# Patient Record
Sex: FEMALE | Race: WHITE | NOT HISPANIC OR LATINO | Employment: PART TIME | URBAN - METROPOLITAN AREA
[De-identification: names, ages, dates, MRNs, and addresses within clinical notes are randomized per-mention and may not be internally consistent; named-entity substitution may affect disease eponyms.]

---

## 2018-05-08 ENCOUNTER — TRANSFERRED RECORDS (OUTPATIENT)
Dept: HEALTH INFORMATION MANAGEMENT | Facility: CLINIC | Age: 18
End: 2018-05-08

## 2018-07-11 ENCOUNTER — TRANSFERRED RECORDS (OUTPATIENT)
Dept: HEALTH INFORMATION MANAGEMENT | Facility: CLINIC | Age: 18
End: 2018-07-11

## 2018-09-11 ENCOUNTER — TRANSFERRED RECORDS (OUTPATIENT)
Dept: HEALTH INFORMATION MANAGEMENT | Facility: CLINIC | Age: 18
End: 2018-09-11

## 2018-09-12 ENCOUNTER — TRANSFERRED RECORDS (OUTPATIENT)
Dept: HEALTH INFORMATION MANAGEMENT | Facility: CLINIC | Age: 18
End: 2018-09-12

## 2018-10-10 ENCOUNTER — TRANSFERRED RECORDS (OUTPATIENT)
Dept: HEALTH INFORMATION MANAGEMENT | Facility: CLINIC | Age: 18
End: 2018-10-10

## 2019-12-09 ENCOUNTER — OFFICE VISIT (OUTPATIENT)
Dept: FAMILY MEDICINE | Facility: CLINIC | Age: 19
End: 2019-12-09
Payer: COMMERCIAL

## 2019-12-09 VITALS
RESPIRATION RATE: 16 BRPM | HEIGHT: 64 IN | TEMPERATURE: 98.3 F | BODY MASS INDEX: 19.29 KG/M2 | OXYGEN SATURATION: 98 % | SYSTOLIC BLOOD PRESSURE: 111 MMHG | DIASTOLIC BLOOD PRESSURE: 68 MMHG | HEART RATE: 78 BPM | WEIGHT: 113 LBS

## 2019-12-09 DIAGNOSIS — S61.412A LACERATION OF LEFT HAND WITHOUT FOREIGN BODY, INITIAL ENCOUNTER: Primary | ICD-10-CM

## 2019-12-09 PROCEDURE — 99203 OFFICE O/P NEW LOW 30 MIN: CPT | Performed by: FAMILY MEDICINE

## 2019-12-09 ASSESSMENT — PAIN SCALES - GENERAL: PAINLEVEL: SEVERE PAIN (7)

## 2019-12-09 ASSESSMENT — MIFFLIN-ST. JEOR: SCORE: 1262.56

## 2019-12-09 NOTE — PATIENT INSTRUCTIONS
Maximum dosages or over the counter pain medicines:    - ibuprofen (advil) 2400 mg daily - or 4 tabs three times daily  Or  - Aleve 2 tabs twice daily  AND can add  - acetamenophen (Tylenol) 4000 mg daily - or 2 tabs 4 times daily

## 2019-12-09 NOTE — PROGRESS NOTES
"Subjective     Wally Varma is a 19 year old female who presents to clinic today for the following health issues:    HPI   ED/UC Followup:    Facility:  [facility]  Date of visit: 12/5/19  Reason for visit: Laceration  Current Status: still hurting but improving     SUBJECTIVE:  Here today in follow-up of left hand laceration.  Records reviewed through care everywhere and with the patient.  About 5 days ago she cut her hand on broken glass.  This required 5 interrupted sutures.  The ER doc noted that the laceration was fairly deep almost down toward the left second MCP joint.  But it did not seem to involve joint or tendons at all.  Sutures are in place and there is a bit of swelling and patient said his finger feels stiff.  Was more sore at first but seems to be getting a little bit better.  Up-to-date on tetanus.    Review of systems otherwise negative.  Past medical, family, and social history reviewed and updated in chart.    OBJECTIVE:  /68 (BP Location: Right arm, Patient Position: Chair, Cuff Size: Adult Regular)   Pulse 78   Temp 98.3  F (36.8  C) (Oral)   Resp 16   Ht 1.626 m (5' 4\")   Wt 51.3 kg (113 lb)   LMP 11/27/2019 (Approximate)   SpO2 98%   Breastfeeding No   BMI 19.40 kg/m    Alert, pleasant, upbeat, and in no apparent discomfort.  S1 and S2 normal, no murmurs, clicks, gallops or rubs. Regular rate and rhythm. Chest is clear; no wheezes or rales. No edema or JVD.  I note only benign skin findings. No unusual rashes or suspicious skin lesions noted. Nails appear normal.   Left hand -healing laceration measuring about an inch and a quarter across the radial aspect of her left second MCP.  All 5 sutures are in place.  There is some relative swelling around this but capillary refill in her second finger is normal as is sensation.  Past labs reviewed with the patient.     ASSESSMENT / PLAN:  (S76.546G) Laceration of left hand without foreign body, initial encounter  (primary " encounter diagnosis)  Comment: Continue routine aftercare and sutures out in 5 days.  Discussed advancing range of motion, etc.  Plan:     Follow up 5 days for suture removal  SAlanis Jenkins MD    (Chart documentation completed in part with Dragon voice-recognition software.  Even though reviewed some grammatical, spelling, and word errors may remain.)

## 2019-12-09 NOTE — LETTER
December 9, 2019        Wally Varma  [id number] [facility] [address]          To whom it may concern:    RE: Wally Love Varma    OK to return to work Saturday 12/14 with the following restrictions for 1 week:  - Avoid excessive moisture (shampooing)  - OK to change dressing as needed    Please contact me for questions or concerns.      Sincerely,        Courtney Jenkins MD

## 2019-12-16 ENCOUNTER — ALLIED HEALTH/NURSE VISIT (OUTPATIENT)
Dept: NURSING | Facility: CLINIC | Age: 19
End: 2019-12-16
Payer: COMMERCIAL

## 2019-12-16 DIAGNOSIS — Z48.02 VISIT FOR SUTURE REMOVAL: Primary | ICD-10-CM

## 2019-12-16 PROCEDURE — 99207 ZZC NO CHARGE LOS: CPT

## 2020-03-11 ENCOUNTER — OFFICE VISIT (OUTPATIENT)
Dept: FAMILY MEDICINE | Facility: CLINIC | Age: 20
End: 2020-03-11
Payer: COMMERCIAL

## 2020-03-11 ENCOUNTER — HEALTH MAINTENANCE LETTER (OUTPATIENT)
Age: 20
End: 2020-03-11

## 2020-03-11 ENCOUNTER — ANCILLARY PROCEDURE (OUTPATIENT)
Dept: GENERAL RADIOLOGY | Facility: CLINIC | Age: 20
End: 2020-03-11
Attending: FAMILY MEDICINE
Payer: COMMERCIAL

## 2020-03-11 VITALS
HEART RATE: 76 BPM | DIASTOLIC BLOOD PRESSURE: 69 MMHG | TEMPERATURE: 98.6 F | HEIGHT: 64 IN | OXYGEN SATURATION: 98 % | WEIGHT: 119 LBS | SYSTOLIC BLOOD PRESSURE: 107 MMHG | BODY MASS INDEX: 20.32 KG/M2

## 2020-03-11 DIAGNOSIS — L91.0 HYPERTROPHIC SCAR: Primary | ICD-10-CM

## 2020-03-11 DIAGNOSIS — M79.642 PAIN OF LEFT HAND: ICD-10-CM

## 2020-03-11 PROCEDURE — 73130 X-RAY EXAM OF HAND: CPT | Mod: LT

## 2020-03-11 PROCEDURE — 99214 OFFICE O/P EST MOD 30 MIN: CPT | Performed by: FAMILY MEDICINE

## 2020-03-11 RX ORDER — TRIAMCINOLONE ACETONIDE 1 MG/G
OINTMENT TOPICAL
Qty: 30 G | Refills: 1 | Status: SHIPPED | OUTPATIENT
Start: 2020-03-11 | End: 2020-07-20

## 2020-03-11 ASSESSMENT — MIFFLIN-ST. JEOR: SCORE: 1289.78

## 2020-03-11 NOTE — PROGRESS NOTES
"Subjective     Wally Varma is a 19 year old female who presents to clinic today for the following health issues:    HPI   Wound Check    Onset: Patient had injured hand in December 2019 but pain still ongoing     Description:   Location: On left hand; Pointer finger   Character: Dull ache to numb     Intensity: mild to moderate    Progression of Symptoms: same    Accompanying Signs & Symptoms:  Other symptoms: Numbness at night in pointer finger, patient is unable to bend/use finger     Therapies Tried and outcome: Patient had sutures, they were removed 10 days after bening placed.      SUBJECTIVE:  Here today with the above.  Previous history reviewed with patient and through care everywhere.  Sustained a laceration from a broken wine bottle to the radial aspect of her left second MCP 2 to 3 months previous.  This was irrigated but not x-rayed.  Closed with interrupted sutures that were removed after 10 days.  Per the report the patient had no mobility issues at the time and no concern about tendon laceration.  But patient still feels some pain around the scar and to her proximal IP joint as well.  Feels stiff overall and she wanted to make sure things were healing properly.    Review of systems otherwise negative.  Past medical, family, and social history reviewed and updated in chart.    OBJECTIVE:  /69 (BP Location: Right arm, Patient Position: Chair, Cuff Size: Adult Regular)   Pulse 76   Temp 98.6  F (37  C) (Oral)   Ht 1.626 m (5' 4\")   Wt 54 kg (119 lb)   SpO2 98%   BMI 20.43 kg/m    Alert, pleasant, upbeat, and in no apparent discomfort.  S1 and S2 normal, no murmurs, clicks, gallops or rubs. Regular rate and rhythm. Chest is clear; no wheezes or rales. No edema or JVD.  Left hand shows a one 1.3 cm scar on the radial aspect of the left second MCP joint.  The scar has a thickness to it though it is retracted rather than sticking out.  I do not appreciate a foreign body but clearly there is " a lot of thickened tissue in the area.  Full active range of motion of her second finger without clicking or catching  Past labs reviewed with the patient.   XRAY - my independent reading of the films showed no foreign body      ASSESSMENT / PLAN:  (L91.0) Hypertrophic scar  (primary encounter diagnosis)  Comment: The scar seems more retracted than sticking out but I think it is behaving in the same manner.  So would like her to massage and some topical steroids and work on exercises a few times a day a week and see her progress over a couple of weeks.  Consider referral to hand therapy if needed  Plan: XR Hand Left G/E 3 Views, triamcinolone         (KENALOG) 0.1 % external ointment            Follow up contact me in 2 weeks  SAlanis Jenkins MD    (Chart documentation completed in part with Dragon voice-recognition software.  Even though reviewed some grammatical, spelling, and word errors may remain.)

## 2020-06-28 PROBLEM — Q87.89 GARDNER'S SYNDROME: Status: ACTIVE | Noted: 2020-06-28

## 2020-07-19 ASSESSMENT — ENCOUNTER SYMPTOMS
SORE THROAT: 0
BREAST MASS: 0
FREQUENCY: 0
EYE PAIN: 0
WEAKNESS: 0
DIARRHEA: 0
DIZZINESS: 0
MYALGIAS: 0
HEMATURIA: 0
PALPITATIONS: 0
JOINT SWELLING: 0
CONSTIPATION: 0
NAUSEA: 0
DYSURIA: 0
FEVER: 0
SHORTNESS OF BREATH: 0
COUGH: 0
HEADACHES: 0
HEMATOCHEZIA: 0
CHILLS: 0
ARTHRALGIAS: 0
ABDOMINAL PAIN: 0
NERVOUS/ANXIOUS: 0
PARESTHESIAS: 0
HEARTBURN: 0

## 2020-07-19 ASSESSMENT — PATIENT HEALTH QUESTIONNAIRE - PHQ9
SUM OF ALL RESPONSES TO PHQ QUESTIONS 1-9: 7
10. IF YOU CHECKED OFF ANY PROBLEMS, HOW DIFFICULT HAVE THESE PROBLEMS MADE IT FOR YOU TO DO YOUR WORK, TAKE CARE OF THINGS AT HOME, OR GET ALONG WITH OTHER PEOPLE: SOMEWHAT DIFFICULT
SUM OF ALL RESPONSES TO PHQ QUESTIONS 1-9: 7

## 2020-07-20 ENCOUNTER — OFFICE VISIT (OUTPATIENT)
Dept: FAMILY MEDICINE | Facility: CLINIC | Age: 20
End: 2020-07-20
Payer: COMMERCIAL

## 2020-07-20 VITALS
HEART RATE: 80 BPM | SYSTOLIC BLOOD PRESSURE: 104 MMHG | HEIGHT: 63 IN | TEMPERATURE: 98.4 F | BODY MASS INDEX: 21.44 KG/M2 | WEIGHT: 121 LBS | DIASTOLIC BLOOD PRESSURE: 69 MMHG | OXYGEN SATURATION: 97 %

## 2020-07-20 DIAGNOSIS — Z00.00 ROUTINE GENERAL MEDICAL EXAMINATION AT A HEALTH CARE FACILITY: Primary | ICD-10-CM

## 2020-07-20 DIAGNOSIS — Q87.89 GARDNER'S SYNDROME: ICD-10-CM

## 2020-07-20 PROCEDURE — 99395 PREV VISIT EST AGE 18-39: CPT | Performed by: FAMILY MEDICINE

## 2020-07-20 ASSESSMENT — ENCOUNTER SYMPTOMS
SHORTNESS OF BREATH: 0
SORE THROAT: 0
CONSTIPATION: 0
ABDOMINAL PAIN: 0
COUGH: 0
DIARRHEA: 0
HEMATOCHEZIA: 0
HEARTBURN: 0
WEAKNESS: 0
HEADACHES: 0
FEVER: 0
NAUSEA: 0
DYSURIA: 0
FREQUENCY: 0
JOINT SWELLING: 0
CHILLS: 0
MYALGIAS: 0
PARESTHESIAS: 0
HEMATURIA: 0
DIZZINESS: 0
PALPITATIONS: 0
BREAST MASS: 0
EYE PAIN: 0
ARTHRALGIAS: 0
NERVOUS/ANXIOUS: 0

## 2020-07-20 ASSESSMENT — MIFFLIN-ST. JEOR: SCORE: 1284.1

## 2020-07-20 ASSESSMENT — PATIENT HEALTH QUESTIONNAIRE - PHQ9: SUM OF ALL RESPONSES TO PHQ QUESTIONS 1-9: 7

## 2020-07-20 NOTE — PROGRESS NOTES
SUBJECTIVE:   CC: Wally Varma is an 20 year old woman who presents for preventive health visit.     Healthy Habits:     Getting at least 3 servings of Calcium per day:  Yes    Bi-annual eye exam:  Yes    Dental care twice a year:  Yes    Sleep apnea or symptoms of sleep apnea:  None    Diet:  Regular (no restrictions)    Frequency of exercise:  2-3 days/week    Duration of exercise:  15-30 minutes    Taking medications regularly:  Yes    Medication side effects:  Not applicable    PHQ-2 Total Score: 3    Additional concerns today:  No        Today's PHQ-2 Score:   PHQ-2 ( 1999 [employer]) 7/19/2020   Q1: Little interest or pleasure in doing things 3   Q2: Feeling down, depressed or hopeless 0   PHQ-2 Score 3   Q1: Little interest or pleasure in doing things Nearly every day   Q2: Feeling down, depressed or hopeless Not at all   PHQ-2 Score 3       Abuse: Current or Past(Physical, Sexual or Emotional)- No  Do you feel safe in your environment? Yes        Social History     Tobacco Use     Smoking status: Never Smoker     Smokeless tobacco: Never Used   Substance Use Topics     Alcohol use: Yes     If you drink alcohol do you typically have >3 drinks per day or >7 drinks per week? No    Alcohol Use 7/19/2020   Prescreen: >3 drinks/day or >7 drinks/week? No   No flowsheet data found.    Reviewed orders with patient.  Reviewed health maintenance and updated orders accordingly - Yes  Labs reviewed in EPIC  BP Readings from Last 3 Encounters:   07/20/20 104/69   03/11/20 107/69   12/09/19 111/68    Wt Readings from Last 3 Encounters:   07/20/20 54.9 kg (121 lb)   03/11/20 54 kg (119 lb)   12/09/19 51.3 kg (113 lb)                    Mammogram not appropriate for this patient based on age.    Pertinent mammograms are reviewed under the imaging tab.  History of abnormal Pap smear: NO - age 21-29 PAP every 3 years recommended     Reviewed and updated as needed this visit by clinical staff  Tobacco  Allergies  Meds   "Problems  Med Hx  Surg Hx  Fam Hx         Reviewed and updated as needed this visit by Provider  Tobacco  Allergies  Meds  Problems  Med Hx  Surg Hx  Fam Hx        Here today for routine checkup  Doing well.  Reports no interval health concerns.    Has a history of Alarcon syndrome and was previously receiving annual colonoscopies and every 2-year endoscopies.  Is a couple of years behind and would like to get this set up.    Review of Systems   Constitutional: Negative for chills and fever.   HENT: Negative for congestion, ear pain, hearing loss and sore throat.    Eyes: Negative for pain and visual disturbance.   Respiratory: Negative for cough and shortness of breath.    Cardiovascular: Negative for chest pain, palpitations and peripheral edema.   Gastrointestinal: Negative for abdominal pain, constipation, diarrhea, heartburn, hematochezia and nausea.   Breasts:  Negative for tenderness, breast mass and discharge.   Genitourinary: Positive for vaginal discharge. Negative for dysuria, frequency, genital sores, hematuria, pelvic pain, urgency and vaginal bleeding.   Musculoskeletal: Negative for arthralgias, joint swelling and myalgias.   Skin: Negative for rash.   Neurological: Negative for dizziness, weakness, headaches and paresthesias.   Psychiatric/Behavioral: Negative for mood changes. The patient is not nervous/anxious.           OBJECTIVE:   /69 (BP Location: Right arm, Patient Position: Chair, Cuff Size: Adult Regular)   Pulse 80   Temp 98.4  F (36.9  C) (Oral)   Ht 1.61 m (5' 3.39\")   Wt 54.9 kg (121 lb)   SpO2 97%   BMI 21.17 kg/m    Physical Exam  GENERAL: healthy, alert and no distress  EYES: Eyes grossly normal to inspection, PERRL and conjunctivae and sclerae normal  HENT: ear canals and TM's normal, nose and mouth without ulcers or lesions  NECK: no adenopathy, no asymmetry, masses, or scars and thyroid normal to palpation  RESP: lungs clear to auscultation - no rales, " "rhonchi or wheezes  CV: regular rate and rhythm, normal S1 S2, no S3 or S4, no murmur, click or rub, no peripheral edema and peripheral pulses strong  ABDOMEN: soft, nontender, no hepatosplenomegaly, no masses and bowel sounds normal  MS: no gross musculoskeletal defects noted, no edema  SKIN: no suspicious lesions or rashes  NEURO: Normal strength and tone, mentation intact and speech normal  PSYCH: mentation appears normal, affect normal/bright    Diagnostic Test Results:  Labs reviewed in Epic    ASSESSMENT/PLAN:   1. Routine general medical examination at a health care facility  Routine health maintenance up-to-date currently    2. Alarcon's syndrome    - GASTROENTEROLOGY ADULT REF PROCEDURE ONLY; Future    COUNSELING:  Reviewed preventive health counseling, as reflected in patient instructions       Regular exercise       Healthy diet/nutrition       Vision screening    Estimated body mass index is 21.17 kg/m  as calculated from the following:    Height as of this encounter: 1.61 m (5' 3.39\").    Weight as of this encounter: 54.9 kg (121 lb).         reports that she has never smoked. She has never used smokeless tobacco.      Counseling Resources:  ATP IV Guidelines  Pooled Cohorts Equation Calculator  Breast Cancer Risk Calculator  FRAX Risk Assessment  ICSI Preventive Guidelines  Dietary Guidelines for Americans, 2010  USDA's MyPlate  ASA Prophylaxis  Lung CA Screening    Courtney Jenkins MD  [facility]  Answers for HPI/ROS submitted by the patient on 7/19/2020   Annual Exam:  If you checked off any problems, how difficult have these problems made it for you to do your work, take care of things at home, or get along with other people?: Somewhat difficult  PHQ9 TOTAL SCORE: 7    "

## 2020-07-28 NOTE — NURSING NOTE
Release of information form signed for [facility] in [place]. Form faxed to clinic at [phone].    Rogelio Tapia CMA

## 2020-08-03 DIAGNOSIS — Z11.59 ENCOUNTER FOR SCREENING FOR OTHER VIRAL DISEASES: Primary | ICD-10-CM

## 2020-08-10 RX ORDER — SODIUM, POTASSIUM,MAG SULFATES 17.5-3.13G
1 SOLUTION, RECONSTITUTED, ORAL ORAL SEE ADMIN INSTRUCTIONS
Qty: 2 BOTTLE | Refills: 0 | Status: ON HOLD | OUTPATIENT
Start: 2020-08-10 | End: 2021-04-23

## 2020-08-10 RX ORDER — BISACODYL 5 MG
5 TABLET, DELAYED RELEASE (ENTERIC COATED) ORAL SEE ADMIN INSTRUCTIONS
Qty: 1 TABLET | Refills: 0 | Status: ON HOLD | OUTPATIENT
Start: 2020-08-10 | End: 2021-04-23

## 2020-08-10 RX ORDER — FEXOFENADINE HCL 60 MG/1
60 TABLET, FILM COATED ORAL 2 TIMES DAILY PRN
COMMUNITY
End: 2021-05-24

## 2020-08-10 ASSESSMENT — MIFFLIN-ST. JEOR: SCORE: 1279.57

## 2020-08-13 DIAGNOSIS — Z11.59 ENCOUNTER FOR SCREENING FOR OTHER VIRAL DISEASES: ICD-10-CM

## 2020-08-13 PROCEDURE — U0003 INFECTIOUS AGENT DETECTION BY NUCLEIC ACID (DNA OR RNA); SEVERE ACUTE RESPIRATORY SYNDROME CORONAVIRUS 2 (SARS-COV-2) (CORONAVIRUS DISEASE [COVID-19]), AMPLIFIED PROBE TECHNIQUE, MAKING USE OF HIGH THROUGHPUT TECHNOLOGIES AS DESCRIBED BY CMS-2020-01-R: HCPCS | Performed by: SURGERY

## 2020-08-14 LAB
SARS-COV-2 RNA SPEC QL NAA+PROBE: NOT DETECTED
SPECIMEN SOURCE: NORMAL

## 2020-08-17 ENCOUNTER — HOSPITAL ENCOUNTER (OUTPATIENT)
Facility: AMBULATORY SURGERY CENTER | Age: 20
Discharge: HOME | End: 2020-08-17
Attending: SURGERY | Admitting: SURGERY
Payer: COMMERCIAL

## 2020-08-17 VITALS
TEMPERATURE: 97.8 F | RESPIRATION RATE: 16 BRPM | HEART RATE: 106 BPM | OXYGEN SATURATION: 98 % | HEIGHT: 63 IN | BODY MASS INDEX: 21.26 KG/M2 | WEIGHT: 120 LBS | SYSTOLIC BLOOD PRESSURE: 111 MMHG | DIASTOLIC BLOOD PRESSURE: 68 MMHG

## 2020-08-17 DIAGNOSIS — Z12.11 SCREENING FOR COLON CANCER: Primary | ICD-10-CM

## 2020-08-17 LAB
COLONOSCOPY: NORMAL
UPPER GI ENDOSCOPY: NORMAL

## 2020-08-17 PROCEDURE — G8907 PT DOC NO EVENTS ON DISCHARG: HCPCS

## 2020-08-17 PROCEDURE — G8918 PT W/O PREOP ORDER IV AB PRO: HCPCS

## 2020-08-17 PROCEDURE — 99153 MOD SED SAME PHYS/QHP EA: CPT | Mod: 59 | Performed by: SURGERY

## 2020-08-17 PROCEDURE — 99152 MOD SED SAME PHYS/QHP 5/>YRS: CPT | Mod: 59 | Performed by: SURGERY

## 2020-08-17 PROCEDURE — 45385 COLONOSCOPY W/LESION REMOVAL: CPT

## 2020-08-17 PROCEDURE — 88305 TISSUE EXAM BY PATHOLOGIST: CPT | Performed by: PATHOLOGY

## 2020-08-17 PROCEDURE — 43239 EGD BIOPSY SINGLE/MULTIPLE: CPT

## 2020-08-17 PROCEDURE — 45385 COLONOSCOPY W/LESION REMOVAL: CPT | Mod: PT | Performed by: SURGERY

## 2020-08-17 RX ORDER — LIDOCAINE 40 MG/G
CREAM TOPICAL
Status: DISCONTINUED | OUTPATIENT
Start: 2020-08-17 | End: 2020-08-18 | Stop reason: HOSPADM

## 2020-08-17 RX ORDER — FENTANYL CITRATE 50 UG/ML
INJECTION, SOLUTION INTRAMUSCULAR; INTRAVENOUS PRN
Status: DISCONTINUED | OUTPATIENT
Start: 2020-08-17 | End: 2020-08-17 | Stop reason: HOSPADM

## 2020-08-17 NOTE — DISCHARGE INSTRUCTIONS
I am a general surgeon that does scopes.  I think in your Alarcon's syndrome should be followed by a  Gastrointestinal doctor that deals with this all the time and that would be at the [facility].  You will need to have a side scope done for your upper endoscopy to look at the area where your common bile duct empties into your duodenum.  You also are having increasing polyps.  Many of the ones I took out look very small and benign, but will see what they come back as.   You will benefit from MAC.  Make sure your doctor orders it with MAC.  This is just added to the orders and is just typed into the area where they ask questions about your taking blood thinners.  This is administered by anesthesia to make you more comfortable than I can do safely during your colonoscopy.  This is done with anesthesia.   You have a lot of sharp turns in your colon and ansesthesia may be able to keep you more comfortable.  You will need and History and physical for this.  Just remind your primary doctor about this when ordering your next colonoscopy.    Eventually you will need to see a colorectal surgeon and have your colon removed.   This is something you want to discuss with your  gastrointestinal doctor.   Please call Carolynn's number at  if we can help you with this.  Leave a message and how to get a hold of you and enough to know who you are.   Thanks]  Nathan Hernandez MD

## 2020-08-19 LAB — COPATH REPORT: NORMAL

## 2020-09-21 ENCOUNTER — VIRTUAL VISIT (OUTPATIENT)
Dept: FAMILY MEDICINE | Facility: CLINIC | Age: 20
End: 2020-09-21
Payer: COMMERCIAL

## 2020-09-21 DIAGNOSIS — Q87.89 GARDNER'S SYNDROME: Primary | ICD-10-CM

## 2020-09-21 PROCEDURE — 99213 OFFICE O/P EST LOW 20 MIN: CPT | Mod: 95 | Performed by: FAMILY MEDICINE

## 2020-09-21 NOTE — Clinical Note
Please fax referral to Dr. Ashia Quezada ([facility]).  I think patient may also need to sign ROWAN to send records there

## 2020-09-21 NOTE — PROGRESS NOTES
"Wally Varma is a 20 year old female who is being evaluated via a billable telephone visit.      The patient has been notified of following:     \"This telephone visit will be conducted via a call between you and your physician/provider. We have found that certain health care needs can be provided without the need for a physical exam.  This service lets us provide the care you need with a short phone conversation.  If a prescription is necessary we can send it directly to your pharmacy.  If lab work is needed we can place an order for that and you can then stop by our lab to have the test done at a later time.    Telephone visits are billed at different rates depending on your insurance coverage. During this emergency period, for some insurers they may be billed the same as an in-person visit.  Please reach out to your insurance provider with any questions.    If during the course of the call the physician/provider feels a telephone visit is not appropriate, you will not be charged for this service.\"    Patient has given verbal consent for Telephone visit?  Yes    What phone number would you like to be contacted at? [phone]    How would you like to obtain your AVS? Isaiaht    Subjective     Wally Varma is a 20 year old female who presents via phone visit today for the following health issues:    HPI    Post Colonoscopy follow up    How many servings of fruits and vegetables do you eat daily?  2-3    On average, how many sweetened beverages do you drink each day (Examples: soda, juice, sweet tea, etc.  Do NOT count diet or artificially sweetened beverages)?   1    How many days per week do you exercise enough to make your heart beat faster? 7    How many minutes a day do you exercise enough to make your heart beat faster? 20 - 29    How many days per week do you miss taking your medication? 0    Phone visit today with patient in follow-up of her Alarcon's syndrome.  Reviewed recent colonoscopy and " endoscopy including polyp results and notes from Dr. Aguiar.  He feels that she needs more advanced care as it relates to her disorder as she is already showing some adenomatous changes in the polyps and may need more advanced therapy up to and including a colectomy.  Patient has spoken with some folks she knows and has the name of a provider at [facility] she would like to see.  So we discussed what that might entail with referral, etc.  Currently feeling fine but just wants to start planning for the future.    Review of Systems   Constitutional, HEENT, cardiovascular, pulmonary, gi and gu systems are negative, except as otherwise noted.       Objective          Vitals:  No vitals were obtained today due to virtual visit.    healthy, alert and no distress  PSYCH: Alert and oriented times 3; coherent speech, normal   rate and volume, able to articulate logical thoughts, able   to abstract reason, no tangential thoughts, no hallucinations   or delusions  Her affect is normal  RESP: No cough, no audible wheezing, able to talk in full sentences  Remainder of exam unable to be completed due to telephone visits    Past labs reviewed with the patient.         Assessment/Plan:    Assessment & Plan     Alarcon's syndrome  Spoke with patient extensively about her current situation and traditional forms of therapy including removal of part or all of her colon and what that may mean.  But I also discussed that I really do not know what is new in the world of therapy for this and I think [facility] would be a great place to find out what type of treatment options there are and at least start the preplanning process for everything.  She agrees with referral and we will help set this up.  - GASTROENTEROLOGY ADULT REF CONSULT ONLY; Future       See Patient Instructions    Return in about 6 months (around 3/21/2021) for Routine Follow-up of chronic issues.    Courtney Jenkins MD  [facility]    Phone  call duration:  14 minutes

## 2020-12-09 ENCOUNTER — NURSE TRIAGE (OUTPATIENT)
Dept: FAMILY MEDICINE | Facility: CLINIC | Age: 20
End: 2020-12-09

## 2020-12-09 NOTE — TELEPHONE ENCOUNTER
Reason for call:  Symptom   Symptom or request: blood in stools    Duration (how long have symptoms been present): 2 weeks  Have you been treated for this before? No    Additional comments: patient took [facility] first available and its a phone visit  Can nurse call to advise if she should come in in person, see someone else? Can [facility] order her a fit test and she can pick that up right away? Just looking for advise    Phone number to reach patient:  Home number on file [phone] (home)    Best Time:  any    Can we leave a detailed message on this number?  YES    Travel screening: Not Applicable

## 2020-12-09 NOTE — TELEPHONE ENCOUNTER
"This writer attempted to contact \"Mary\" on 12/09/20      Reason for call triage blood in stools, gather more information ( has telephone visit scheduled on 12/14, may not be appropriate to wait or have virtual visit for this issue)  and left message.      If patient calls back:   Registered Nurse called. Follow Triage Call workflow        Radha Perales RN    "

## 2020-12-09 NOTE — TELEPHONE ENCOUNTER
"  Reason for Disposition    MODERATE rectal bleeding (small blood clots, passing blood without stool, or toilet water turns red) more than once a day    Bloody, black, or tarry bowel movements    Additional Information    Negative: Passed out (i.e., fainted, collapsed and was not responding)    Negative: Shock suspected (e.g., cold/pale/clammy skin, too weak to stand, low BP, rapid pulse)    Negative: Vomiting red blood or black (coffee ground) material    Negative: Diarrhea is the main symptom    Negative: Rectal symptoms    Negative: SEVERE rectal bleeding (large blood clots; on and off, or constant bleeding)    Answer Assessment - Initial Assessment Questions  1. APPEARANCE of BLOOD: \"What color is it?\" \"Is it passed separately, on the surface of the stool, or mixed in with the stool?\"       Bright red blood, in with stool and in toilet water and on toilet paper. Bleeding even without stools. Bleeding into undergarments, has to wear sanitary pads.   2. AMOUNT: \"How much blood was passed?\"       Unclear, but is having to wear a sanitary pad in undergarments. Changes pad twice per day. Bleeding has increased as of today, seems to be seeing more blood than previously.  3. FREQUENCY: \"How many times has blood been passed with the stools?\"       With each stool.Generally goes 2 x per day, at minimum. Bleeding though in between stools and bathroom trips.   4. ONSET: \"When was the blood first seen in the stools?\" (Days or weeks)       2 weeks now today, was onset. At fist was monitoring, has Alarcon's Syndrome and told to watch for bleeding, but bleeding has persisted now for 2 weeks and has increased in amount.  5. DIARRHEA: \"Is there also some diarrhea?\" If so, ask: \"How many diarrhea stools were passed in past 24 hours?\"       No diarrhea, regular stools.  6. CONSTIPATION: \"Do you have constipation?\" If so, \"How bad is it?\"      No constipation.  7. RECURRENT SYMPTOMS: \"Have you had blood in your stools before?\" If " "so, ask: \"When was the last time?\" and \"What happened that time?\"       No, this is first time.  8. BLOOD THINNERS: \"Do you take any blood thinners?\" (e.g., Coumadin/warfarin, Pradaxa/dabigatran, aspirin)      Does not take any blood thinners  9. OTHER SYMPTOMS: \"Do you have any other symptoms?\"  (e.g., abdominal pain, vomiting, dizziness, fever)      Some nausea, dry heaves, feels some weakness and \"woozy\" at times. Denies abd or pelvic pain, cramping, vomiting, bloating, constipation, diarrhea, shortness of breath, chest pain.  10. PREGNANCY: \"Is there any chance you are pregnant?\" \"When was your last menstrual period?\"        No and LMP was 3 weeks ago    Protocols used: RECTAL BLEEDING-A-OH    "

## 2020-12-14 ENCOUNTER — TRANSFERRED RECORDS (OUTPATIENT)
Dept: HEALTH INFORMATION MANAGEMENT | Facility: CLINIC | Age: 20
End: 2020-12-14

## 2021-01-04 ENCOUNTER — HEALTH MAINTENANCE LETTER (OUTPATIENT)
Age: 21
End: 2021-01-04

## 2021-01-15 ENCOUNTER — TRANSFERRED RECORDS (OUTPATIENT)
Dept: HEALTH INFORMATION MANAGEMENT | Facility: CLINIC | Age: 21
End: 2021-01-15

## 2021-01-15 ENCOUNTER — MEDICAL CORRESPONDENCE (OUTPATIENT)
Dept: HEALTH INFORMATION MANAGEMENT | Facility: CLINIC | Age: 21
End: 2021-01-15

## 2021-01-20 ENCOUNTER — TRANSCRIBE ORDERS (OUTPATIENT)
Dept: OTHER | Age: 21
End: 2021-01-20

## 2021-01-20 DIAGNOSIS — D13.91 FAP (FAMILIAL ADENOMATOUS POLYPOSIS): Primary | ICD-10-CM

## 2021-01-20 DIAGNOSIS — K92.1 HEMATOCHEZIA: ICD-10-CM

## 2021-01-20 DIAGNOSIS — Q87.89 GARDNER SYNDROME: ICD-10-CM

## 2021-01-22 NOTE — TELEPHONE ENCOUNTER
RECORDS RECEIVED FROM:Internal    DATE RECEIVED: 2/8/2021   NOTES STATUS DETAILS   OFFICE NOTE from referring provider  Internal Internal recs in epic  [facility] recs scanned in epic    MEDICATION LIST Internal    LABS     BIOPSIES/PATHOLOGY RELATED TO DIAGNOSIS Internal 8/17/20   DIAGNOSTIC PROCEDURES     COLONOSCOPY Internal 12/14/20 8/17/20   UPPER ENDOSCOPY (EGD) Internal 8/17/20

## 2021-02-05 NOTE — PROGRESS NOTES
"Colon and Rectal Surgery Virtual Note    RE: Wally Varma.  : 2000.  DIMAS: 2021.    Wally Varma is a 20 year old female who is being evaluated via a billable video visit.      The patient has been notified of following:     \"This video visit will be conducted via a call between you and your physician/provider. We have found that certain health care needs can be provided without the need for an in-person physical exam.  This service lets us provide the care you need with a video conversation.  If a prescription is necessary we can send it directly to your pharmacy.  If lab work is needed we can place an order for that and you can then stop by our lab to have the test done at a later time.    Video visits are billed at different rates depending on your insurance coverage.  Please reach out to your insurance provider with any questions.    If during the course of the call the physician/provider feels a video visit is not appropriate, you will not be charged for this service.\"    Patient has given verbal consent for Video visit? Yes    Video Start Time: 2:30 PM    Reason for visit: familial adenomatous polyposis syndrome.    HPI:    21 y/o F who has been seen by [facility] since age 12 for purported history of FAP.    Initial colonoscopy in  showed 3 polyps, two years later she had 6, then 23, then 49.    She recently developed hematochezia.    Colonoscopy 2020 with 43 polyps:         EGD 20:    Multiple small pedunculated and sessile polyps with no stigmata of          recent bleeding were found in the gastric body. This was biopsied with a          cold forceps for histology.     FINAL DIAGNOSIS:   A. STOMACH, ANTRUM, BIOPSY:   - Gastric antral mucosa with mild chronic inactive gastritis   - No H. pylori like organisms identified on routine staining   - Negative for intestinal metaplasia or dysplasia   B. STOMACH, BODY, POLYPECTOMY:   - Fundic gland polyp with low-grade " dysplasia   - No evidence of high-grade dysplasia or malignancy     Justa is currently asymptomatic. Denies constipation, diarrhea, unintentional weight loss, or hematochezia. FH significant for colon cancer in a paternal second cousin in his 40s and a maternal great grandmother in her 70s. No previous thyroid US. Denies previous anorectal operations or fecal incontinence.    Medical history:  Past Medical History:   Diagnosis Date     Familial polyposis        Surgical history:  Past Surgical History:   Procedure Laterality Date     COLONOSCOPY       COLONOSCOPY N/A 8/17/2020    Procedure: Colonoscopy, With Polypectomy And Biopsy;  Surgeon: Nathan Hernandez MD;  Location: [facility] OR     COLONOSCOPY WITH CO2 INSUFFLATION N/A 8/17/2020    Procedure: COLONOSCOPY, WITH CO2 INSUFFLATION;  Surgeon: Nathan Hernandez MD;  Location: [facility]     COMBINED ESOPHAGOSCOPY, GASTROSCOPY, DUODENOSCOPY (EGD) WITH CO2 INSUFFLATION N/A 8/17/2020    Procedure: ESOPHAGOGASTRODUODENOSCOPY, WITH CO2 INSUFFLATION;  Surgeon: Nathan Hernandez MD;  Location: [facility]     ENDOSCOPY       ESOPHAGOSCOPY, GASTROSCOPY, DUODENOSCOPY (EGD), COMBINED N/A 8/17/2020    Procedure: Esophagogastroduodenoscopy, With Biopsy;  Surgeon: Nathan Hernandez MD;  Location: [facility] OR       Family history:  Family History   Problem Relation Age of Onset     Hypertension Mother      Hypertension Maternal Grandfather        Medications:  Current Outpatient Medications   Medication Sig Dispense Refill     bisacodyl (DULCOLAX) 5 MG EC tablet Take 1 tablet (5 mg) by mouth See Admin Instructions --Day prior to procedure: Take 1 tablet bisacodyl at 12:00. (Patient not taking: Reported on 9/21/2020) 1 tablet 0     fexofenadine (ALLEGRA) 60 MG tablet Take 60 mg by mouth 2 times daily       Na Sulfate-K Sulfate-Mg Sulf (SUPREP BOWEL PREP) solution Take 177 mLs (1 Bottle) by mouth See Admin Instructions -Evening before procedure complete steps 1 through 4 (see  package) using (1) 6 ounce bottle before bed.  Morning of procedure, repeat steps 1 through 4 using the other 6 ounce bottle. (Patient not taking: Reported on 9/21/2020) 2 Bottle 0     polyethylene glycol (GOLYTELY) 236 g suspension Take 4,000 mLs (4 L) by mouth See Admin Instructions -Day prior to procedure: 1. Take 1 tablet bisacodyl at 12:00.  2. Mix GoLytely as directed on container.  3.  At 6:00 PM, drink an 8 oz. glass of solution and continue drinking an 8 oz. glass every 15 minutes until bottle is empty. (Patient not taking: Reported on 9/21/2020) 4 L 0       Allergies:  No Known Allergies    Social history:   Social History     Tobacco Use     Smoking status: Never Smoker     Smokeless tobacco: Never Used   Substance Use Topics     Alcohol use: Yes     Marital status: single.    Investigations:  None.    Procedures:  None.    ASSESSMENT  21 y/o F with purported FAP. Only 2 rectal adenomas were found on her last colonoscopy in December, although previously she has had up to 9 adenomas in the rectum. We discussed the role and impact of operative treatment, mainly total abdominal proctocolectomy with ileal pouch-anal anastomosis and temporary diverting loop ileostomy. This would be performed in 2 or 3 operations, respectively. We discussed the long-term side effects and possible sequelae of these including but not limited to persistent rectal neoplasia requiring completion proctectomy, anastomotic leak, pouch failure requiring permanent fecal diversion, fecal seepage and incontinence, pouchitis, infertility, refractory diarrhea, recurrent bowel obstructions, dehydration, kidney failure, and kidney stones. Risks, benefits, and alternatives of operative treatment were thoroughly discussed with the patient, he/she understands these well and agrees to proceed.    PLAN  1.  Schedule MR a/p and thyroid US.  2.  Will obtain genetic testing results from [facility].  3.  Schedule clinic visit with  flexible sigmoidoscopy.    4.  If she wishes to undergo cryopreservation of oocytes to prevent infertility, will refer to OB/GYN.  5.  Schedule robotic total abdominal proctocolectomy with ileal pouch-anal anastomosis, diverting ileostomy, and flexible sigmoidoscopy. Will need PAC, labs, and MBP w/Abx.    Video-Visit Details    Type of service:  Video Visit    Video End Time (time video stopped): 3:11PM    Originating Location (pt. Location): Home    Distant Location (provider location):  [facility] [place]     Mode of Communication:  Video Conference via [employer].    60 minutes spent on the date of the encounter doing chart review, history, review of imaging, documentation ,and further activities as noted above, which includes my time spent on video with the patient/family.       Wade Maguire M.D., M.Sc.     Division of Colon and Rectal Surgery  [facility]    Referring Provider:  Juan Ramon Lu DO  [facility] [place]  [address] [place] [address]     Primary Care Provider:  No primary care provider on file.     CC:  Flori Healy MD

## 2021-02-08 ENCOUNTER — VIRTUAL VISIT (OUTPATIENT)
Dept: SURGERY | Facility: CLINIC | Age: 21
End: 2021-02-08
Attending: INTERNAL MEDICINE
Payer: COMMERCIAL

## 2021-02-08 ENCOUNTER — PRE VISIT (OUTPATIENT)
Dept: SURGERY | Facility: CLINIC | Age: 21
End: 2021-02-08

## 2021-02-08 VITALS — BODY MASS INDEX: 21.26 KG/M2 | WEIGHT: 120 LBS | HEIGHT: 63 IN

## 2021-02-08 DIAGNOSIS — D13.91 FAP (FAMILIAL ADENOMATOUS POLYPOSIS): Primary | ICD-10-CM

## 2021-02-08 PROCEDURE — 99205 OFFICE O/P NEW HI 60 MIN: CPT | Mod: 95 | Performed by: COLON & RECTAL SURGERY

## 2021-02-08 ASSESSMENT — PAIN SCALES - GENERAL: PAINLEVEL: NO PAIN (0)

## 2021-02-08 ASSESSMENT — MIFFLIN-ST. JEOR: SCORE: 1273.45

## 2021-02-08 NOTE — PATIENT INSTRUCTIONS
Follow up:    1. You will need a MRI of your pelvis and abdomen     2. You will also need a thyroid ultrasound     3. Beatris will give you a call to schedule surgery     4. Appointments you will need in prep for surgery   -pre op physical   -COVID test  -blood work  -visit with  in clinic for a flexible sigmoidoscopy   -thyroid US  -MRI of pelvis and abdomen   -visit with nutrition   -visit with our ostomy nurse     5. You will need to do a full bowel prep with antibiotics the day before  Surgery.     You will be mailed the instructions.   Please call with any questions or concerns regarding your clinic visit today.    It is a pleasure being involved in your health care.    Contacts post-consultation depending on your need:    Radiology Appointments [phone]    Schedule Clinic Appointments [phone] # 1   M-F 7:30 - 5 pm    [facility] [phone]    Clinic Fax Number [phone]    Surgery Scheduling [phone]    My Chart is available 24 hours a day and is a secure way to access your records and communicate with your care team.  I strongly recommend signing up if you haven't already done so, if you are comfortable with computers.  If you would like to inquire about this or are having problems with My Chart access, you may call [phone] or go online at [email or web address].  Please allow at least 24 hours for a response and extra time on weekends and Holidays.

## 2021-02-08 NOTE — NURSING NOTE
"Chief Complaint   Patient presents with     Video Visit     Familial adenomatous polyposis       Vitals:    02/08/21 1412   Weight: 54.4 kg (120 lb)   Height: 1.6 m (5' 3\")       Body mass index is 21.26 kg/m .    Lyssa Caro MA    "

## 2021-02-09 ENCOUNTER — TELEPHONE (OUTPATIENT)
Dept: SURGERY | Facility: CLINIC | Age: 21
End: 2021-02-09

## 2021-02-09 DIAGNOSIS — D13.91 FAP (FAMILIAL ADENOMATOUS POLYPOSIS): Primary | ICD-10-CM

## 2021-02-09 NOTE — TELEPHONE ENCOUNTER
Patient is scheduled for surgery with Dr. Wade Maravilla with [facility]    Date of Surgery: 4/22/2021*    Location: [place]    Pre-op with surgeon (if applicable): 2/8/2021    Pre-operative Imaging appointment:   Ultrasound Thyroid appointment:                   2/25/2021 at 2:30 PM                                                                                  [facility]                                                                                  [id number] [place] [phone]     Pre-operative Imaging appointment:   MRI Abdomen WWO appointment:                2/25/2021 at 3:15 PM                                                                                  [facility]                                                                                  [address] [place] [phone]  Pre-operative Imaging appointment:   MRI Pelvis WWO appointment:                      2/25/2021 at 4:00 PM                                                                                  [facility]                                                                                  [address] [place] [phone]     Pre-operative Ostomy Nutrition appointment:  Video appointment with Shirley Grace RD:      4/12/2021 at 8:30 AM                                                                                 VIDEO VISIT     Pre-operative Lab appointment:   Lab draw appointment:                                   4/12/2021 at 11:00 PM                                                                                  [address] Lab                                                                                [place] [address]     Pre-operative consult with surgeon:   Flexible Sigmoidoscopy appointment with Dr. Wade Maguire: 4/12/21 at 11:30 AM                                                                                  [address] [address]     Pre-operative Physical appointment:   Clinic appointment with Pre-operative Assessment Center (PAC): 4/12/21 at 12:45 PM                                                                                  [address] [address]     Pre-operative Wound/Ostomy appointment:  Clinic appointment with Wound/Ostomy nurse: 4/19/2021 at 9:30 AM                                                                                 [address](4K)                                                                                [address] [place] [id number]     Pre-operative COVID-19 Test:   Pre-procedure asymptomatic COVID PCR:  4/19/2021 at 10:45 AM                                                                                  [address] Lab                                                                                [address] [place] [id number]     Post operative appointment:  Clinic appointment with Traci Zurita NP: 5/6/2021 at 7:00  AM                                                                                  [facility]                                                                                [address] [place] [id number]     Post operative appointment:  Clinic appointment with Dr. Wade Maguire: 6/7/2021 at 11:00 AM                                                                                 [facility]                                                                                [address] [place] [id number]    Surgery packet: sent via [employer] and Mail on 2/9/2021    Additional comments:   - patient chose to schedule surgery on 4/22/2021, since it is after she returns from a family vacation on 4/18/2021  *Surgery date is tentative pending being able to officially schedule the Case into April at [facility] (currently only able to officially schedule into March)

## 2021-02-10 ENCOUNTER — DOCUMENTATION ONLY (OUTPATIENT)
Dept: UROLOGY | Facility: CLINIC | Age: 21
End: 2021-02-10

## 2021-02-10 NOTE — PROGRESS NOTES
Adina Rao 2/10/2021 8:35AM   Action Taken [facility]

## 2021-02-17 NOTE — TELEPHONE ENCOUNTER
FUTURE VISIT INFORMATION      SURGERY INFORMATION:    Date: 4.22.21    Location: [facility]    Surgeon:  Dr. Maguire    Anesthesia Type:  General    Procedure: Robotic total abdominal proctocolectomy with ileal pouch-anal anastomosis, diverting ileostomy, and flexible sigmoidoscopy    Consult: 2.8.21    RECORDS REQUESTED FROM:

## 2021-02-25 ENCOUNTER — HOSPITAL ENCOUNTER (OUTPATIENT)
Dept: MRI IMAGING | Facility: CLINIC | Age: 21
End: 2021-02-25
Attending: COLON & RECTAL SURGERY
Payer: COMMERCIAL

## 2021-02-25 ENCOUNTER — ANCILLARY PROCEDURE (OUTPATIENT)
Dept: ULTRASOUND IMAGING | Facility: CLINIC | Age: 21
End: 2021-02-25
Attending: COLON & RECTAL SURGERY
Payer: COMMERCIAL

## 2021-02-25 DIAGNOSIS — D13.91 FAP (FAMILIAL ADENOMATOUS POLYPOSIS): ICD-10-CM

## 2021-02-25 PROCEDURE — 72197 MRI PELVIS W/O & W/DYE: CPT

## 2021-02-25 PROCEDURE — 74183 MRI ABD W/O CNTR FLWD CNTR: CPT | Mod: 26 | Performed by: RADIOLOGY

## 2021-02-25 PROCEDURE — 74183 MRI ABD W/O CNTR FLWD CNTR: CPT

## 2021-02-25 PROCEDURE — 76536 US EXAM OF HEAD AND NECK: CPT | Mod: GC | Performed by: RADIOLOGY

## 2021-02-25 PROCEDURE — A9585 GADOBUTROL INJECTION: HCPCS | Performed by: STUDENT IN AN ORGANIZED HEALTH CARE EDUCATION/TRAINING PROGRAM

## 2021-02-25 PROCEDURE — 255N000002 HC RX 255 OP 636: Performed by: STUDENT IN AN ORGANIZED HEALTH CARE EDUCATION/TRAINING PROGRAM

## 2021-02-25 PROCEDURE — 72197 MRI PELVIS W/O & W/DYE: CPT | Mod: 26 | Performed by: RADIOLOGY

## 2021-02-25 RX ORDER — GADOBUTROL 604.72 MG/ML
0.1 INJECTION INTRAVENOUS ONCE
Status: COMPLETED | OUTPATIENT
Start: 2021-02-25 | End: 2021-02-25

## 2021-02-25 RX ADMIN — GADOBUTROL 5.5 ML: 604.72 INJECTION INTRAVENOUS at 10:28

## 2021-04-05 NOTE — PROGRESS NOTES
Colon and Rectal Surgery Follow-up Clinic Note      RE: Wally Varma  : 2000  DIMAS: 2021    DIAGNOSIS: Familial adenomatous polyposis syndrome.     HPI:    19 y/o F who has been seen by [facility] since age 12 for purported history of FAP.     Initial colonoscopy in  showed 3 polyps, two years later she had 6, then 23, then 49.    She recently developed hematochezia.    Colonoscopy 2020 with 43 polyps:                    EGD 20:               Multiple small pedunculated and sessile polyps with no stigmata of                recent bleeding were found in the gastric body. This was biopsied with a                cold forceps for histology.     FINAL DIAGNOSIS:   A. STOMACH, ANTRUM, BIOPSY:   - Gastric antral mucosa with mild chronic inactive gastritis   - No H. pylori like organisms identified on routine staining   - Negative for intestinal metaplasia or dysplasia   B. STOMACH, BODY, POLYPECTOMY:   - Fundic gland polyp with low-grade dysplasia   - No evidence of high-grade dysplasia or malignancy      I had a virtual visit with [facility] on 21 and recommended MR a/p and thyroid US and flexible sigmoidoscopy in clinic with plan for robotic total abdominal proctocolectomy with ileal pouch-anal anastomosis, diverting ileostomy, and flexible sigmoidoscopy.     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    MR Abdomen/Pelvis 21:  IMPRESSION:  No definite abdominal abnormality though images are not obtained in  the enterography protocol and the evaluation of the bowel is limited.    US Thyroid 21:  Impression:  Normal ultrasound evaluation of the thyroid.    FH: significant for colon cancer in a paternal second cousin in his 40s and a maternal great grandmother in her 70s.     INTERVAL HISTORY: Justa is here for flexible sigmoidoscopy.  Denies any new  "symptoms including rectal bleeding, fecal incontinence, or abdominal pain.  Her MR abdomen and pelvis and thyroid ultrasound were both normal.    Physical Examination:  /64 (BP Location: Left arm, Patient Position: Sitting, Cuff Size: Adult Regular)   Pulse 81   Temp 97.8  F (36.6  C) (Oral)   Ht 5' 3.25\"   Wt 133 lb 8 oz   LMP 04/01/2021 (Approximate)   SpO2 98%   BMI 23.46 kg/m    Abdomen soft, nontender.  No masses or inguinal adenopathy palpated.  Perianal skin intact.  Digital rectal exam: No masses palpated.  Flexible sigmoidoscopy: after obtaining informed consent and performing a \"time out\", an adult flexible sigmoidoscope was introduced through the anus and passed up to the mid sigmoid colon. The quality of the prep was good. Findings: 5-six 2-3 mm pedunculated polyps mainly at the proximal rectum.  These were all removed with a cold biopsy forceps.  Specimen retrieval was complete.  Bleeding was minimal.  Specimens were identified and sent for permanent pathology. There was no active ulceration, inflammation or bleeding. No additional abnormalities were seen. Total scope time: 15 minutes. The patient tolerated the procedure well.    ASSESSMENT  Attenuated FAP.  We discussed the role and impact of operative treatment.  Given that she has rectal sparing we discussed 2 approaches, total abdominal proctocolectomy with ileal pouch-anal anastomosis versus total abdominal colectomy with ileorectal anastomosis.  I did discuss with her the risk of ongoing neoplasia in the rectum with an increased risk of rectal cancer.  This has been reported in approximately 20-30% of long-term studies.  We also discussed the functional advantages of an ileorectal anastomosis versus a an ileal J-pouch.  Specifically, the infertility risk of these 2 approaches.  Ileorectal anastomosis would require yearly flexible sigmoidoscopy.  We also discussed the long-term side effects and possible sequelae of an ileoanal " anastomosis including but not limited to anastomotic leak, pouch failure requiring permanent fecal diversion, fecal seepage and incontinence, pouchitis, infertility, refractory diarrhea, recurrent bowel obstructions, dehydration, kidney failure, and kidney stones.  All questions were answered to her satisfaction.    PLAN  1.  Schedule laparoscopic total abdominal colectomy with flexible sigmoidoscopy.  Will need PAC, labs, and mechanical bowel prep with antibiotics.    30 minutes spent on the date of the encounter doing chart review, history and exam, documentation and further activities as noted above.    Wade Maguire M.D., M.Sc.     Division of Colon and Rectal Surgery  [facility]    Referring Provider:  DO BRENNEN [facility] [place]  [address] [place] [address]      Primary Care Provider:  No primary care provider on file.      CC:  Flori Healy MD

## 2021-04-07 DIAGNOSIS — Z11.59 ENCOUNTER FOR SCREENING FOR OTHER VIRAL DISEASES: ICD-10-CM

## 2021-04-09 ENCOUNTER — TEAM CONFERENCE (OUTPATIENT)
Dept: SURGERY | Facility: CLINIC | Age: 21
End: 2021-04-09

## 2021-04-09 DIAGNOSIS — D13.91 FAP (FAMILIAL ADENOMATOUS POLYPOSIS): Primary | ICD-10-CM

## 2021-04-09 RX ORDER — NEOMYCIN SULFATE 500 MG/1
1000 TABLET ORAL EVERY 6 HOURS
Qty: 6 TABLET | Refills: 0 | Status: ON HOLD | OUTPATIENT
Start: 2021-04-09 | End: 2021-04-23

## 2021-04-09 RX ORDER — ONDANSETRON 4 MG/1
4 TABLET, FILM COATED ORAL EVERY 6 HOURS
Qty: 3 TABLET | Refills: 0 | Status: ON HOLD | OUTPATIENT
Start: 2021-04-09 | End: 2021-04-23

## 2021-04-09 RX ORDER — POLYETHYLENE GLYCOL 3350 17 G/17G
238 POWDER, FOR SOLUTION ORAL SEE ADMIN INSTRUCTIONS
Qty: 14 PACKET | Refills: 0 | Status: ON HOLD | OUTPATIENT
Start: 2021-04-09 | End: 2021-04-23

## 2021-04-09 RX ORDER — METRONIDAZOLE 500 MG/1
500 TABLET ORAL EVERY 6 HOURS
Qty: 3 TABLET | Refills: 0 | Status: ON HOLD | OUTPATIENT
Start: 2021-04-09 | End: 2021-04-23

## 2021-04-09 NOTE — PROGRESS NOTES
COLON AND RECTAL SURGERY HUDDLE:    Patient was reviewed in preporation for their surgery the following was reviewed and has been completed:    Surgeon: Dr. Wade Maguire    Surgery & Date: 4/22 robotic proctocolectomy     Last MD Note: reviewed    Anesthesia Type: General    Other Providers: No    PAC: Yes    WOC: Yes    Nutrition: Yes    Labs: Yes    Bowel Prep: Yes MiraLAX / Gatorade , Antibiotic and Magnesium Citrate    Packet: Yes    Imaging: Yes    Post-Op Appointments: Yes    COVID: Yes

## 2021-04-12 ENCOUNTER — OFFICE VISIT (OUTPATIENT)
Dept: SURGERY | Facility: CLINIC | Age: 21
End: 2021-04-12
Payer: COMMERCIAL

## 2021-04-12 ENCOUNTER — VIRTUAL VISIT (OUTPATIENT)
Dept: GASTROENTEROLOGY | Facility: CLINIC | Age: 21
End: 2021-04-12
Payer: COMMERCIAL

## 2021-04-12 ENCOUNTER — ANESTHESIA EVENT (OUTPATIENT)
Dept: SURGERY | Facility: CLINIC | Age: 21
End: 2021-04-12
Payer: COMMERCIAL

## 2021-04-12 ENCOUNTER — PRE VISIT (OUTPATIENT)
Dept: SURGERY | Facility: CLINIC | Age: 21
End: 2021-04-12

## 2021-04-12 VITALS
RESPIRATION RATE: 16 BRPM | WEIGHT: 134.4 LBS | SYSTOLIC BLOOD PRESSURE: 117 MMHG | DIASTOLIC BLOOD PRESSURE: 73 MMHG | TEMPERATURE: 98.2 F | HEART RATE: 80 BPM | OXYGEN SATURATION: 97 % | HEIGHT: 62 IN | BODY MASS INDEX: 24.73 KG/M2

## 2021-04-12 VITALS
OXYGEN SATURATION: 98 % | WEIGHT: 133.5 LBS | DIASTOLIC BLOOD PRESSURE: 64 MMHG | TEMPERATURE: 97.8 F | SYSTOLIC BLOOD PRESSURE: 115 MMHG | HEIGHT: 63 IN | HEART RATE: 81 BPM | BODY MASS INDEX: 23.65 KG/M2

## 2021-04-12 DIAGNOSIS — Z01.818 PRE-OP EVALUATION: Primary | ICD-10-CM

## 2021-04-12 DIAGNOSIS — D13.91 FAP (FAMILIAL ADENOMATOUS POLYPOSIS): ICD-10-CM

## 2021-04-12 DIAGNOSIS — D13.91 FAP (FAMILIAL ADENOMATOUS POLYPOSIS): Primary | ICD-10-CM

## 2021-04-12 LAB
ALBUMIN SERPL-MCNC: 4 G/DL (ref 3.4–5)
ALP SERPL-CCNC: 76 U/L (ref 40–150)
ALT SERPL W P-5'-P-CCNC: 22 U/L (ref 0–50)
ANION GAP SERPL CALCULATED.3IONS-SCNC: 3 MMOL/L (ref 3–14)
AST SERPL W P-5'-P-CCNC: 12 U/L (ref 0–45)
BILIRUB SERPL-MCNC: 0.5 MG/DL (ref 0.2–1.3)
BUN SERPL-MCNC: 11 MG/DL (ref 7–30)
CALCIUM SERPL-MCNC: 9 MG/DL (ref 8.5–10.1)
CHLORIDE SERPL-SCNC: 106 MMOL/L (ref 94–109)
CO2 SERPL-SCNC: 28 MMOL/L (ref 20–32)
CREAT SERPL-MCNC: 0.57 MG/DL (ref 0.52–1.04)
ERYTHROCYTE [DISTWIDTH] IN BLOOD BY AUTOMATED COUNT: 11.9 % (ref 10–15)
GFR SERPL CREATININE-BSD FRML MDRD: >90 ML/MIN/{1.73_M2}
GLUCOSE SERPL-MCNC: 81 MG/DL (ref 70–99)
HCT VFR BLD AUTO: 39.6 % (ref 35–47)
HGB BLD-MCNC: 13.1 G/DL (ref 11.7–15.7)
MCH RBC QN AUTO: 31 PG (ref 26.5–33)
MCHC RBC AUTO-ENTMCNC: 33.1 G/DL (ref 31.5–36.5)
MCV RBC AUTO: 94 FL (ref 78–100)
PLATELET # BLD AUTO: 230 10E9/L (ref 150–450)
POTASSIUM SERPL-SCNC: 3.9 MMOL/L (ref 3.4–5.3)
PREALB SERPL IA-MCNC: 20 MG/DL (ref 15–45)
PROT SERPL-MCNC: 7.4 G/DL (ref 6.8–8.8)
RBC # BLD AUTO: 4.22 10E12/L (ref 3.8–5.2)
SODIUM SERPL-SCNC: 137 MMOL/L (ref 133–144)
WBC # BLD AUTO: 7.2 10E9/L (ref 4–11)

## 2021-04-12 PROCEDURE — 99214 OFFICE O/P EST MOD 30 MIN: CPT | Mod: 25 | Performed by: COLON & RECTAL SURGERY

## 2021-04-12 PROCEDURE — 80053 COMPREHEN METABOLIC PANEL: CPT | Performed by: PATHOLOGY

## 2021-04-12 PROCEDURE — 99202 OFFICE O/P NEW SF 15 MIN: CPT | Mod: 24 | Performed by: PHYSICIAN ASSISTANT

## 2021-04-12 PROCEDURE — 86850 RBC ANTIBODY SCREEN: CPT | Performed by: PATHOLOGY

## 2021-04-12 PROCEDURE — 86900 BLOOD TYPING SEROLOGIC ABO: CPT | Performed by: PATHOLOGY

## 2021-04-12 PROCEDURE — 86901 BLOOD TYPING SEROLOGIC RH(D): CPT | Performed by: PATHOLOGY

## 2021-04-12 PROCEDURE — 36415 COLL VENOUS BLD VENIPUNCTURE: CPT | Performed by: PATHOLOGY

## 2021-04-12 PROCEDURE — 88305 TISSUE EXAM BY PATHOLOGIST: CPT | Performed by: PATHOLOGY

## 2021-04-12 PROCEDURE — 45331 SIGMOIDOSCOPY AND BIOPSY: CPT | Performed by: COLON & RECTAL SURGERY

## 2021-04-12 PROCEDURE — 85027 COMPLETE CBC AUTOMATED: CPT | Performed by: PATHOLOGY

## 2021-04-12 PROCEDURE — 97802 MEDICAL NUTRITION INDIV IN: CPT | Mod: 95 | Performed by: DIETITIAN, REGISTERED

## 2021-04-12 PROCEDURE — 84134 ASSAY OF PREALBUMIN: CPT | Performed by: PATHOLOGY

## 2021-04-12 SDOH — HEALTH STABILITY: MENTAL HEALTH: HOW MANY STANDARD DRINKS CONTAINING ALCOHOL DO YOU HAVE ON A TYPICAL DAY?: NOT ASKED

## 2021-04-12 SDOH — HEALTH STABILITY: MENTAL HEALTH: HOW OFTEN DO YOU HAVE A DRINK CONTAINING ALCOHOL?: NOT ASKED

## 2021-04-12 SDOH — HEALTH STABILITY: MENTAL HEALTH: HOW OFTEN DO YOU HAVE 6 OR MORE DRINKS ON ONE OCCASION?: NOT ASKED

## 2021-04-12 ASSESSMENT — MIFFLIN-ST. JEOR
SCORE: 1338.64
SCORE: 1322.88

## 2021-04-12 ASSESSMENT — PAIN SCALES - GENERAL
PAINLEVEL: NO PAIN (0)
PAINLEVEL: NO PAIN (0)

## 2021-04-12 ASSESSMENT — LIFESTYLE VARIABLES: TOBACCO_USE: 0

## 2021-04-12 NOTE — H&P
Pre-Operative H & P     CC:  Preoperative exam to assess for increased cardiopulmonary risk while undergoing surgery and anesthesia.    Date of Encounter: 4/12/2021  Primary Care Physician:  Courtney Jenkins  associated diagnosis: FAP    HPI  Wally Varma is a 20 year old female who presents for pre-operative H & P in preparation for Robotic total abdominal proctocolectomy with ileal pouch-anal anastomosis, SIGMOIDOSCOPY, FLEXIBLE with Dr. Maguire on 4/22/21 at [facility]. Patient is being evaluated without significant comorbid conditions      Ms. Varma has been following with [facility] peds for FAP. She had an initial colonoscopy in 2014 showing 3 polyps. She has had multiple colonoscopies with increasing polyps. Most recently, colonoscopy 12/2020 showed 43 polyps. She recently developed hematochezia. She was seen by Dr. Maguire for further evaluation. She is now scheduled for the above procedure.      History is obtained from the patient and chart review.    Past Medical History  Past Medical History:   Diagnosis Date     Familial polyposis        Past Surgical History  Past Surgical History:   Procedure Laterality Date     COLONOSCOPY       COLONOSCOPY N/A 08/17/2020    Procedure: Colonoscopy, With Polypectomy And Biopsy;  Surgeon: Nathan Hernandez MD;  Location: [facility] OR     COLONOSCOPY WITH CO2 INSUFFLATION N/A 08/17/2020    Procedure: COLONOSCOPY, WITH CO2 INSUFFLATION;  Surgeon: Nathan Hernandez MD;  Location: [facility] OR     COMBINED ESOPHAGOSCOPY, GASTROSCOPY, DUODENOSCOPY (EGD) WITH CO2 INSUFFLATION N/A 08/17/2020    Procedure: ESOPHAGOGASTRODUODENOSCOPY, WITH CO2 INSUFFLATION;  Surgeon: Nathan Hernandez MD;  Location: [facility] OR     ENDOSCOPY       ESOPHAGOSCOPY, GASTROSCOPY, DUODENOSCOPY (EGD), COMBINED N/A 08/17/2020    Procedure: Esophagogastroduodenoscopy, With Biopsy;  Surgeon: Nathan Hernandez MD;  Location: [facility] OR     TONSILLECTOMY          Hx of Blood transfusions/reactions: denies     Hx of abnormal bleeding or anti-platelet use: denies    Menstrual history: Patient's last menstrual period was 04/01/2021 (approximate).    Personal or FH with difficulty with Anesthesia:  denies    Prior to Admission Medications  Current Outpatient Medications   Medication Sig Dispense Refill     bisacodyl (DULCOLAX) 5 MG EC tablet Take 1 tablet (5 mg) by mouth See Admin Instructions --Day prior to procedure: Take 1 tablet bisacodyl at 12:00. (Patient not taking: Reported on 9/21/2020) 1 tablet 0     fexofenadine (ALLEGRA) 60 MG tablet Take 60 mg by mouth 2 times daily as needed        metroNIDAZOLE (FLAGYL) 500 MG tablet Take 1 tablet (500 mg) by mouth every 6 hours At 8:00 am, 2:00 pm, 8:00 pm the day prior to your surgery with neomycin and zofran. 3 tablet 0     Na Sulfate-K Sulfate-Mg Sulf (SUPREP BOWEL PREP) solution Take 177 mLs (1 Bottle) by mouth See Admin Instructions -Evening before procedure complete steps 1 through 4 (see package) using (1) 6 ounce bottle before bed.  Morning of procedure, repeat steps 1 through 4 using the other 6 ounce bottle. (Patient not taking: Reported on 9/21/2020) 2 Bottle 0     neomycin (MYCIFRADIN) 500 MG tablet Take 2 tablets (1,000 mg) by mouth every 6 hours At 8:00 am, 2:00 pm, 8:00 pm the day prior to your surgery with flagyl and zofran. 6 tablet 0     ondansetron (ZOFRAN) 4 MG tablet Take 1 tablet (4 mg) by mouth every 6 hours At 8:00 am, 2:00 pm, 8:00 pm the day prior to your surgery with neomycin and flagyl. 3 tablet 0     polyethylene glycol (GOLYTELY) 236 g suspension Take 4,000 mLs (4 L) by mouth See Admin Instructions -Day prior to procedure: 1. Take 1 tablet bisacodyl at 12:00.  2. Mix GoLytely as directed on container.  3.  At 6:00 PM, drink an 8 oz. glass of solution and continue drinking an 8 oz. glass every 15 minutes until bottle is empty. (Patient not taking: Reported on 9/21/2020) 4 L 0     polyethylene  "glycol (MIRALAX) 17 g packet Take 238 g by mouth See Admin Instructions Starting at 4 pm night prior to surgery. Refer to \"Getting Ready for Surgery\" instructions. 14 packet 0       Allergies  No Known Allergies    Social History  Social History     Socioeconomic History     Marital status: Single     Spouse name: Not on file     Number of children: Not on file     Years of education: Not on file     Highest education level: Not on file   Occupational History     Not on file   Social Needs     Financial resource strain: Not on file     Food insecurity     Worry: Not on file     Inability: Not on file     Transportation needs     Medical: Not on file     Non-medical: Not on file   Tobacco Use     Smoking status: Never Smoker     Smokeless tobacco: Never Used   Substance and Sexual Activity     Alcohol use: Yes     Comment: rare     Drug use: Never     Sexual activity: Yes     Partners: Male   Lifestyle     Physical activity     Days per week: Not on file     Minutes per session: Not on file     Stress: Not on file   Relationships     Social connections     Talks on phone: Not on file     Gets together: Not on file     Attends Hoahaoism service: Not on file     Active member of club or organization: Not on file     Attends meetings of clubs or organizations: Not on file     Relationship status: Not on file     Intimate partner violence     Fear of current or ex partner: Not on file     Emotionally abused: Not on file     Physically abused: Not on file     Forced sexual activity: Not on file   Other Topics Concern     Not on file   Social History Narrative     Not on file       Family History  Family History   Problem Relation Age of Onset     Hypertension Mother      Hypertension Maternal Grandfather      Deep Vein Thrombosis (DVT) Maternal Grandfather        ROS/MED HX  ENT/Pulmonary:  - neg pulmonary ROS  (-) tobacco use   Neurologic:  - neg neurologic ROS     Cardiovascular:  - neg cardiovascular ROS   (+) -----No " "previous cardiac testing  (-) taking anticoagulants/antiplatelets   METS/Exercise Tolerance:     Hematologic:  - neg hematologic  ROS  (-) history of blood transfusion   Musculoskeletal:  - neg musculoskeletal ROS     GI/Hepatic: Comment: FAP      Renal/Genitourinary:  - neg Renal ROS     Endo:  - neg endo ROS     Psychiatric/Substance Use:  - neg psychiatric ROS     Infectious Disease:  - neg infectious disease ROS     Malignancy:       Other:            The complete review of systems is negative other than noted in the HPI or here.   Temp: 98.2  F (36.8  C) Temp src: Oral BP: 117/73 Pulse: 80   Resp: 16 SpO2: 97 %(RA)         134 lbs 6.4 oz  5' 2\"   Body mass index is 24.58 kg/m .       Physical Exam  Constitutional: Awake, alert, cooperative, no apparent distress, and appears stated age.  Eyes: Pupils equal, round and reactive to light, extra ocular muscles intact, sclera clear, conjunctiva normal.  HENT: Normocephalic, oral pharynx with moist mucus membranes, good dentition. No goiter appreciated.   Respiratory: Clear to auscultation bilaterally, no crackles or wheezing.  Cardiovascular: Regular rate and rhythm, normal S1 and S2, and no murmur noted.  Carotids no bruits. No edema. Palpable pulses to radial arteries.   GI: Normal bowel sounds, soft, non-distended, non-tender  Genitourinary:  deferred  Skin: Warm and dry.  No rashes at anticipated surgical site.   Musculoskeletal: Full ROM of neck. There is no redness, warmth, or swelling of the exposed joints. Gross motor strength is normal.    Neurologic: Awake, alert, oriented to name, place and time. Cranial nerves II-XII are grossly intact. Gait is normal.   Neuropsychiatric: Calm, cooperative. Normal affect.     Labs: (personally reviewed)  Component      Latest Ref Rng & Units 4/12/2021   Sodium      133 - 144 mmol/L 137   Potassium      3.4 - 5.3 mmol/L 3.9   Chloride      94 - 109 mmol/L 106   Carbon Dioxide      20 - 32 mmol/L 28   Anion Gap      3 - 14 " mmol/L 3   Glucose      70 - 99 mg/dL 81   Urea Nitrogen      7 - 30 mg/dL 11   Creatinine      0.52 - 1.04 mg/dL 0.57   GFR Estimate      >60 mL/min/1.73:m2 >90   GFR Estimate If Black      >60 mL/min/1.73:m2 >90   Calcium      8.5 - 10.1 mg/dL 9.0   Bilirubin Total      0.2 - 1.3 mg/dL 0.5   Albumin      3.4 - 5.0 g/dL 4.0   Protein Total      6.8 - 8.8 g/dL 7.4   Alkaline Phosphatase      40 - 150 U/L 76   ALT      0 - 50 U/L 22   AST      0 - 45 U/L 12   WBC      4.0 - 11.0 10e9/L 7.2   RBC Count      3.8 - 5.2 10e12/L 4.22   Hemoglobin      11.7 - 15.7 g/dL 13.1   Hematocrit      35.0 - 47.0 % 39.6   MCV      78 - 100 fl 94   MCH      26.5 - 33.0 pg 31.0   MCHC      31.5 - 36.5 g/dL 33.1   RDW      10.0 - 15.0 % 11.9   Platelet Count      150 - 450 10e9/L 230   Prealbumin      15 - 45 mg/dL 20           ASSESSMENT and PLAN  Justa Varma is a 20 year old female scheduled for Robotic total abdominal proctocolectomy with ileal pouch-anal anastomosis, SIGMOIDOSCOPY, FLEXIBLE on 4/22/21 by Dr. Maguire in treatment of FAP.  PAC referral for risk assessment and optimization for anesthesia without significant comorbid conditions:      Pre-operative considerations:   1.  Cardiac:  Functional status- METS 4. denies cardiac symptoms.  intermediate risk surgery with 0.9% (RCRI #) risk of major adverse cardiac event.      2.  Pulm:  Airway feasible.  CHAKA risk: low. non smoker      3.  GI:  Risk of PONV score = 3.  If > 2, anti-emetic intervention recommended.   ~FAP with the above procedure planned       VTE risk: 1.8% (family history of VTE)     Patient is optimized and is acceptable candidate for the proposed procedure.  No further diagnostic evaluation is needed.     25 minutes were spent completing chart review, seeing the patient, reviewing labs and test result and completing documentation      Anastasia Way PA-C  Preoperative Assessment Center  [facility]  Phone:  [phone]  Fax: [phone]

## 2021-04-12 NOTE — LETTER
"    4/12/2021         RE: Wally Varma  [id number] [address] [address]        Dear Colleague,    Thank you for referring your patient, Wally Varma, to the [facility] [place]. Please see a copy of my visit note below.    Wally Varma 20 year old female who is being evaluated via a billable video visit.      The patient has been notified of following:     \"This video visit will be conducted via a call between you and your physician/provider. We have found that certain health care needs can be provided without the need for an in-person physical exam.  This service lets us provide the care you need with a video conversation.  If a prescription is necessary we can send it directly to your pharmacy.  If lab work is needed we can place an order for that and you can then stop by our lab to have the test done at a later time.    Video visits are billed at different rates depending on your insurance coverage.  Please reach out to your insurance provider with any questions.    If during the course of the call the physician/provider feels a video visit is not appropriate, you will not be charged for this service.\"    Patient has given verbal consent for Video visit? Yes  During this virtual visit the patient is located in [place], patient verifies this as the location during the entirety of this visit.     How would you like to obtain your AVS? MyChart  If you are dropped from the video visit, the video invite should be resent to: Other e-mail: my chart  Will anyone else be joining your video visit? No      Video-Visit Details    Type of service:  Video Visit    Video Start Time: 8:27 AM   Video End Time: 9:06 AM    Originating Location (pt. Location): Home    Distant Location (provider location):  [facility] [place]     Platform used for Video Visit: Poacht App    During this virtual visit the patient is located in [place], patient verifies " "this as the location during the entirety of this visit.       [facility] Outpatient Medical Nutrition Therapy      Time Spent:  39 minutes  Session Type:  Initial visit  Referring Physician:  Wade Maguire MD  Reason for RD Visit:   Pre-op Ileostomy     Nutrition Assessment:  Patient is a 20 year old female with history that includes familiar adenomatous polyp syndrome. Pt will have robotic total abdominal proctocolectomy with ileal pouch anal anastomosis with temporary ileostomy.  Patient interested in diet education for post op diet and ileostomy diet education.    Height:   Ht Readings from Last 1 Encounters:   02/08/21 1.6 m (5' 3\")     Weight:    Wt Readings from Last 10 Encounters:   02/08/21 54.4 kg (120 lb)   08/10/20 54.4 kg (120 lb)   07/20/20 54.9 kg (121 lb)   03/11/20 54 kg (119 lb)   12/09/19 51.3 kg (113 lb)     BMI:   Estimated body mass index is 21.26 kg/m  as calculated from the following:    Height as of 2/8/21: 1.6 m (5' 3\").    Weight as of 2/8/21: 54.4 kg (120 lb).    Diet Recall:  (some usual recent meals): eats 2 meals per day. Water, gatorade, fruit and veggies.  Meal Food    Breakfast Bagel with cream cheese   Lunch Pasta with tomato sauce, sometimes broccoli or bread   Dinner Meat (burger, pork chop), potato/rice and veg   Snacks cheez its or goldfish crackers or chips and dip/taco dip   Beverages 23-46 oz Water, 16-20 oz gatorade   Alcohol Intake Occasionally once in awhile has 1 drink or less      Labs:    Last Comprehensive Metabolic Panel:  No results found for: NA, POTASSIUM, CHLORIDE, CO2, ANIONGAP, GLC, BUN, CR, GFRESTIMATED, SORIN    CBC RESULTS: No results for input(s): WBC, RBC, HGB, HCT, MCV, MCH, MCHC, RDW, PLT in the last [id number] hours.    Pertinent Medications/vitamin and mineral supplements:  Reported taking a gummy Vitamin C. Others reviewed in EMR.  Food Allergies:  NKFA    MALNUTRITION:  % Weight Loss:  None noted  % Intake:  No decreased intake noted  Subcutaneous Fat " Loss:  None observed  Muscle Loss:  None observed  Fluid Retention:  None noted    Malnutrition Diagnosis: Patient does not meet two of the above criteria necessary for diagnosing malnutrition  In Context of:  Chronic illness or disease    Nutrition Diagnosis:    Food and nutrition related knowledge deficit related to lack of previous diet education for ileostomy as evidenced by pt report and interest in diet education.    Nutrition Prescription: ileostomy diet (low fiber, low adequate fluids including oral rehydration drinks).    Nutrition Intervention:    Nutrition Education/Counseling:  Provided diet education for ileostomy including following low fiber diet, eating adequate calories, chewing food very well before swallowing, preventing dehydration, drinking adequate fluids, drinking oral rehydration solutions, waiting for 30 minutes after meals to drink and only taking small sips of fluids with meals as needed. Recommended avoiding concentrated sugars/added sugars. Discussed tips and some foods that may help thicken stools and tips for reducing potential issues with gas. Answered patient's questions. Patient verbalized understanding of education provided. See Goals below.     Educational Materials Provided:  Nutrition care manual: Ileostomy Nutrition therapy    Goals:  1. After surgery, you will be on a low fiber diet. (See some tips below and also handout with low fiber foods and tips).    For low fiber diet:  -Avoid raw vegetables. Remove skins and peels from vegetables and cook vegetables very well/until more mushy before eating. (may be better tolerated mashed well or blended to pureed consistency).   -Avoid any vegetables that do not cook down well to a mushier/softer consistency.  -OK to eat canned peaches or canned pear, as well as applesauce. Avoid raw fruit with the exception of ripe bananas, ripe cantaloupe or ripe honeydew melon only.  -Can eat cooked potatoes (no skin), cooked sweet potatoes (no  skin), and cooked winter squash (such as butternut squash or acorn squash), no skins.  -Avoid whole grain breads/crackers. Ok to Choose white bread, low fiber crackers such as saltines, white rice, regular pasta not wheat).   -Avoid dry grissly meats. Tender/moist meat, fish, tofu, ground meats (beef, turkey, chicken) tend to be better tolerated. Can add these to soups, eat with sauces so they are softer and more moist cooked.   -Do not eat nuts or seeds.   -Can have smooth peanut butter. (not chunky).    2. You will likely tolerate eating smaller meals more often instead of fewer larger meals.   -Can aim for eating a small meal or snack every 2-4 hours.    --Can make your homemade smoothie for a small meal/snack (fairlife milk with banana and canned peaches/canned pears or can add some peanut butter in fairlife milk/chocolate fairlife milk or can use a strawberry protein powder and blend in banana and fairlife milk for some examples).    3. Eating your largest meal earlier in the day/in the middle of the day, may be better tolerated and help decrease the amount of stool output you have overnight.    4. Chew food very well before swallowing.    5. Avoid concentrated sweets such as desserts, sweetened drinks, fruit juices and sweet tea as these can can looser stools/higher output.    6. Include some foods that may help thicken stools such as unsweetened applesauce, banana, pasta, pretzels, animal crackers, white bread, potatoes (no skin).    7. Drink adequate fluids. Aim for drinking at least 8-10 cups of fluids per day.    8. Drink 2-4 cups (500 ml-1000 ml) of these fluids as a sugar free or unsweetened electrolyte drink such as sugar free/unsweetened pedialyte, gatorade zero or powerade zero. (See some recipes in handout).    9. Limit/avoid drinking fluids with meals and aim to drink fluids 30 minutes after meals to avoid flushing foods through your system too quickly.     10. To reduce issues with gas, avoid  carbonated beverages, chewing gum and avoid drinking with straws.    Nutrition Monitoring and Evaluation: Will monitor adherence to nutrition recommendations at future RD visits.     Further Medical Nutrition Therapy:  Follow up scheduled for May 12, 2021 at 9:30 AM.  Patient was encouraged to call/contact RD with any further questions.    Shirley Grace MS, RD, LD

## 2021-04-12 NOTE — PROGRESS NOTES
"Wally Varma 20 year old female who is being evaluated via a billable video visit.      The patient has been notified of following:     \"This video visit will be conducted via a call between you and your physician/provider. We have found that certain health care needs can be provided without the need for an in-person physical exam.  This service lets us provide the care you need with a video conversation.  If a prescription is necessary we can send it directly to your pharmacy.  If lab work is needed we can place an order for that and you can then stop by our lab to have the test done at a later time.    Video visits are billed at different rates depending on your insurance coverage.  Please reach out to your insurance provider with any questions.    If during the course of the call the physician/provider feels a video visit is not appropriate, you will not be charged for this service.\"    Patient has given verbal consent for Video visit? Yes  During this virtual visit the patient is located in [place], patient verifies this as the location during the entirety of this visit.     How would you like to obtain your AVS? MyChart  If you are dropped from the video visit, the video invite should be resent to: Other e-mail: my chart  Will anyone else be joining your video visit? No      Video-Visit Details    Type of service:  Video Visit    Video Start Time: 8:27 AM   Video End Time: 9:06 AM    Originating Location (pt. Location): Home    Distant Location (provider location):  [facility]     Platform used for Video Visit: ABODO    During this virtual visit the patient is located in [place], patient verifies this as the location during the entirety of this visit.       [facility] Outpatient Medical Nutrition Therapy      Time Spent:  39 minutes  Session Type:  Initial visit  Referring Physician:  Wade Maguire MD  Reason for RD Visit:   Pre-op Ileostomy     Nutrition " "Assessment:  Patient is a 20 year old female with history that includes familiar adenomatous polyp syndrome. Pt will have robotic total abdominal proctocolectomy with ileal pouch anal anastomosis with temporary ileostomy.  Patient interested in diet education for post op diet and ileostomy diet education.    Height:   Ht Readings from Last 1 Encounters:   02/08/21 1.6 m (5' 3\")     Weight:    Wt Readings from Last 10 Encounters:   02/08/21 54.4 kg (120 lb)   08/10/20 54.4 kg (120 lb)   07/20/20 54.9 kg (121 lb)   03/11/20 54 kg (119 lb)   12/09/19 51.3 kg (113 lb)     BMI:   Estimated body mass index is 21.26 kg/m  as calculated from the following:    Height as of 2/8/21: 1.6 m (5' 3\").    Weight as of 2/8/21: 54.4 kg (120 lb).    Diet Recall:  (some usual recent meals): eats 2 meals per day. Water, gatorade, fruit and veggies.  Meal Food    Breakfast Bagel with cream cheese   Lunch Pasta with tomato sauce, sometimes broccoli or bread   Dinner Meat (burger, pork chop), potato/rice and veg   Snacks cheez its or goldfish crackers or chips and dip/taco dip   Beverages 23-46 oz Water, 16-20 oz gatorade   Alcohol Intake Occasionally once in awhile has 1 drink or less      Labs:    Last Comprehensive Metabolic Panel:  No results found for: NA, POTASSIUM, CHLORIDE, CO2, ANIONGAP, GLC, BUN, CR, GFRESTIMATED, SORIN    CBC RESULTS: No results for input(s): WBC, RBC, HGB, HCT, MCV, MCH, MCHC, RDW, PLT in the last 20322 hours.    Pertinent Medications/vitamin and mineral supplements:  Reported taking a gummy Vitamin C. Others reviewed in EMR.  Food Allergies:  NKFA    MALNUTRITION:  % Weight Loss:  None noted  % Intake:  No decreased intake noted  Subcutaneous Fat Loss:  None observed  Muscle Loss:  None observed  Fluid Retention:  None noted    Malnutrition Diagnosis: Patient does not meet two of the above criteria necessary for diagnosing malnutrition  In Context of:  Chronic illness or disease    Nutrition Diagnosis:    Food " and nutrition related knowledge deficit related to lack of previous diet education for ileostomy as evidenced by pt report and interest in diet education.    Nutrition Prescription: ileostomy diet (low fiber, low adequate fluids including oral rehydration drinks).    Nutrition Intervention:    Nutrition Education/Counseling:  Provided diet education for ileostomy including following low fiber diet, eating adequate calories, chewing food very well before swallowing, preventing dehydration, drinking adequate fluids, drinking oral rehydration solutions, waiting for 30 minutes after meals to drink and only taking small sips of fluids with meals as needed. Recommended avoiding concentrated sugars/added sugars. Discussed tips and some foods that may help thicken stools and tips for reducing potential issues with gas. Answered patient's questions. Patient verbalized understanding of education provided. See Goals below.     Educational Materials Provided:  Nutrition care manual: Ileostomy Nutrition therapy    Goals:  1. After surgery, you will be on a low fiber diet. (See some tips below and also handout with low fiber foods and tips).    For low fiber diet:  -Avoid raw vegetables. Remove skins and peels from vegetables and cook vegetables very well/until more mushy before eating. (may be better tolerated mashed well or blended to pureed consistency).   -Avoid any vegetables that do not cook down well to a mushier/softer consistency.  -OK to eat canned peaches or canned pear, as well as applesauce. Avoid raw fruit with the exception of ripe bananas, ripe cantaloupe or ripe honeydew melon only.  -Can eat cooked potatoes (no skin), cooked sweet potatoes (no skin), and cooked winter squash (such as butternut squash or acorn squash), no skins.  -Avoid whole grain breads/crackers. Ok to Choose white bread, low fiber crackers such as saltines, white rice, regular pasta not wheat).   -Avoid dry grissly meats. Tender/moist meat,  fish, tofu, ground meats (beef, turkey, chicken) tend to be better tolerated. Can add these to soups, eat with sauces so they are softer and more moist cooked.   -Do not eat nuts or seeds.   -Can have smooth peanut butter. (not chunky).    2. You will likely tolerate eating smaller meals more often instead of fewer larger meals.   -Can aim for eating a small meal or snack every 2-4 hours.    --Can make your homemade smoothie for a small meal/snack (fairlife milk with banana and canned peaches/canned pears or can add some peanut butter in fairlife milk/chocolate fairlife milk or can use a strawberry protein powder and blend in banana and fairlife milk for some examples).    3. Eating your largest meal earlier in the day/in the middle of the day, may be better tolerated and help decrease the amount of stool output you have overnight.    4. Chew food very well before swallowing.    5. Avoid concentrated sweets such as desserts, sweetened drinks, fruit juices and sweet tea as these can can looser stools/higher output.    6. Include some foods that may help thicken stools such as unsweetened applesauce, banana, pasta, pretzels, animal crackers, white bread, potatoes (no skin).    7. Drink adequate fluids. Aim for drinking at least 8-10 cups of fluids per day.    8. Drink 2-4 cups (500 ml-1000 ml) of these fluids as a sugar free or unsweetened electrolyte drink such as sugar free/unsweetened pedialyte, gatorade zero or powerade zero. (See some recipes in handout).    9. Limit/avoid drinking fluids with meals and aim to drink fluids 30 minutes after meals to avoid flushing foods through your system too quickly.     10. To reduce issues with gas, avoid carbonated beverages, chewing gum and avoid drinking with straws.    Nutrition Monitoring and Evaluation: Will monitor adherence to nutrition recommendations at future RD visits.     Further Medical Nutrition Therapy:  Follow up scheduled for May 12, 2021 at 9:30 AM.  Patient  was encouraged to call/contact RD with any further questions.    Shirley Grace MS, RD, LD

## 2021-04-12 NOTE — PATIENT INSTRUCTIONS
It was nice meeting you today. Below are the nutrition recommendations we discussed at your visit.    Please let me know if you have any additional questions.    Nutrition Recommendations    1. After surgery, you will be on a low fiber diet. (See some tips below and also handout with low fiber foods and tips).    For low fiber diet:  -Avoid raw vegetables. Remove skins and peels from vegetables and cook vegetables very well/until more mushy before eating. (may be better tolerated mashed well or blended to pureed consistency).   -Avoid any vegetables that do not cook down well to a mushier/softer consistency.  -OK to eat canned peaches or canned pear, as well as applesauce. Avoid raw fruit with the exception of ripe bananas, ripe cantaloupe or ripe honeydew melon only.  -Can eat cooked potatoes (no skin), cooked sweet potatoes (no skin), and cooked winter squash (such as butternut squash or acorn squash), no skins.  -Avoid whole grain breads/crackers. Ok to Choose white bread, low fiber crackers such as saltines, white rice, regular pasta not wheat).   -Avoid dry grissly meats. Tender/moist meat, fish, tofu, ground meats (beef, turkey, chicken) tend to be better tolerated. Can add these to soups, eat with sauces so they are softer and more moist cooked.   -Do not eat nuts or seeds.   -Can have smooth peanut butter. (not chunky).    2. You will likely tolerate eating smaller meals more often instead of fewer larger meals.   -Can aim for eating a small meal or snack every 2-4 hours.  --Can make your homemade smoothie for a small meal/snack (fairlife milk with banana and canned peaches/canned pears or can add some peanut butter in fairlife milk/chocolate fairlife milk or can use a strawberry protein powder and blend in banana and fairlife milk for some examples).    3. Eating your largest meal earlier in the day/in the middle of the day, may be better tolerated and help decrease the amount of stool output you have  overnight.    4. Chew food very well before swallowing.    5. Avoid concentrated sweets such as desserts, sweetened drinks, fruit juices and sweet tea as these can can looser stools/higher output.    6. Include some foods that may help thicken stools such as unsweetened applesauce, banana, pasta, pretzels, animal crackers, white bread, potatoes (no skin).    7. Drink adequate fluids. Aim for drinking at least 8-10 cups of fluids per day.    8. Drink 2-4 cups (500 ml-1000 ml) of these fluids as a sugar free or unsweetened electrolyte drink such as sugar free/unsweetened pedialyte, gatorade zero or powerade zero. (See some recipes in handout).    9. Limit/avoid drinking fluids with meals and aim to drink fluids 30 minutes after meals to avoid flushing foods through your system too quickly.     10. To reduce issues with gas, avoid carbonated beverages, chewing gum and avoid drinking with straws.    Your appointment is scheduled for May 12, 2021 at 9:30 AM. If you need to make any changes to your appointment, please call [phone].    Shirley Grace, MS, RD, LD

## 2021-04-12 NOTE — NURSING NOTE
"Chief Complaint   Patient presents with     Pre-Op Exam     DOS: 04/22/2021     Flexible Sigmoidoscopy       Vitals:    04/12/21 1111   BP: 115/64   BP Location: Left arm   Patient Position: Sitting   Cuff Size: Adult Regular   Pulse: 81   Temp: 97.8  F (36.6  C)   TempSrc: Oral   SpO2: 98%   Weight: 133 lb 8 oz   Height: 5' 3.25\"       Body mass index is 23.46 kg/m .          Susie Hughes CMA    "

## 2021-04-12 NOTE — ANESTHESIA PREPROCEDURE EVALUATION
Anesthesia Pre-Procedure Evaluation    Patient: Wally Varma   MRN: [id number] : 2000        Preoperative Diagnosis: FAP (familial adenomatous polyposis) [D12.6]   Procedure : Procedure(s):  Robotic total abdominal proctocolectomy with ileal pouch-anal anastomosis  SIGMOIDOSCOPY, FLEXIBLE     Past Medical History:   Diagnosis Date     Familial polyposis       Past Surgical History:   Procedure Laterality Date     COLONOSCOPY       COLONOSCOPY N/A 2020    Procedure: Colonoscopy, With Polypectomy And Biopsy;  Surgeon: Nathan Hernandez MD;  Location: [facility] OR     COLONOSCOPY WITH CO2 INSUFFLATION N/A 2020    Procedure: COLONOSCOPY, WITH CO2 INSUFFLATION;  Surgeon: Nahtan Hernandez MD;  Location: [facility] OR     COMBINED ESOPHAGOSCOPY, GASTROSCOPY, DUODENOSCOPY (EGD) WITH CO2 INSUFFLATION N/A 2020    Procedure: ESOPHAGOGASTRODUODENOSCOPY, WITH CO2 INSUFFLATION;  Surgeon: Nathan Hernandez MD;  Location: [facility] OR     ENDOSCOPY       ESOPHAGOSCOPY, GASTROSCOPY, DUODENOSCOPY (EGD), COMBINED N/A 2020    Procedure: Esophagogastroduodenoscopy, With Biopsy;  Surgeon: Nathan Hernandez MD;  Location: [facility] OR      No Known Allergies   Social History     Tobacco Use     Smoking status: Never Smoker     Smokeless tobacco: Never Used   Substance Use Topics     Alcohol use: Yes      Wt Readings from Last 1 Encounters:   21 60.6 kg (133 lb 8 oz)        Anesthesia Evaluation   Pt has had prior anesthetic.     History of anesthetic complications   reports anesthesia was too light with a previous colonoscopy, which was very uncomfortable. Denies any other issues.    ROS/MED HX  ENT/Pulmonary:  - neg pulmonary ROS  (-) tobacco use   Neurologic:  - neg neurologic ROS     Cardiovascular:  - neg cardiovascular ROS   (+) -----No previous cardiac testing  (-) taking anticoagulants/antiplatelets and murmur   METS/Exercise Tolerance: 4 - Raking leaves, gardening    Hematologic:  - neg  hematologic  ROS  (-) history of blood transfusion   Musculoskeletal:  - neg musculoskeletal ROS     GI/Hepatic: Comment: FAP      Renal/Genitourinary:  - neg Renal ROS     Endo:  - neg endo ROS     Psychiatric/Substance Use:  - neg psychiatric ROS     Infectious Disease:  - neg infectious disease ROS     Malignancy:       Other:            Physical Exam    Airway        Mallampati: II   TM distance: > 3 FB   Neck ROM: full   Mouth opening: > 3 cm    Respiratory Devices and Support         Dental  no notable dental history         Cardiovascular          Rhythm and rate: regular and normal (-) no murmur    Pulmonary   pulmonary exam normal        breath sounds clear to auscultation           OUTSIDE LABS:  CBC:   Lab Results   Component Value Date    WBC 7.2 04/12/2021    HGB 13.1 04/12/2021    HCT 39.6 04/12/2021     04/12/2021     BMP:   Lab Results   Component Value Date     04/12/2021    POTASSIUM 3.9 04/12/2021    CHLORIDE 106 04/12/2021    CO2 28 04/12/2021    BUN 11 04/12/2021    CR 0.57 04/12/2021    GLC 81 04/12/2021     COAGS: No results found for: PTT, INR, FIBR  POC: No results found for: BGM, HCG, HCGS  HEPATIC:   Lab Results   Component Value Date    ALBUMIN 4.0 04/12/2021    PROTTOTAL 7.4 04/12/2021    ALT 22 04/12/2021    AST 12 04/12/2021    ALKPHOS 76 04/12/2021    BILITOTAL 0.5 04/12/2021     OTHER:   Lab Results   Component Value Date    SORIN 9.0 04/12/2021       Anesthesia Plan    ASA Status:  1      Anesthesia Type: General.     - Airway: ETT      Maintenance: Balanced.   Techniques and Equipment:     - Lines/Monitors: 2nd IV     Consents    Anesthesia Plan(s) and associated risks, benefits, and realistic alternatives discussed. Questions answered and patient/representative(s) expressed understanding.     - Discussed with:  Patient      - Extended Intubation/Ventilatory Support Discussed: No.      - Patient is DNR/DNI Status: No    Use of blood products discussed: Yes.     -  Discussed with: Patient.     - Consented: consented to blood products            Reason for refusal: other.     Postoperative Care    Pain management: Multi-modal analgesia.   PONV prophylaxis: Ondansetron (or other 5HT-3), Dexamethasone or Solumedrol     Comments:              PAC Discussion and Assessment    ASA Classification: 2  Case is suitable for: [place]  Anesthetic techniques and relevant risks discussed: GA                  PAC Resident/NP Anesthesia Assessment: Justa Varma is a 20 year old female scheduled for Robotic total abdominal proctocolectomy with ileal pouch-anal anastomosis, SIGMOIDOSCOPY, FLEXIBLE on 4/22/21 by Dr. Maguire in treatment of FAP.  PAC referral for risk assessment and optimization for anesthesia without significant comorbid conditions:      Pre-operative considerations:   1.  Cardiac:  Functional status- METS 4. denies cardiac symptoms.  intermediate risk surgery with 0.9% (RCRI #) risk of major adverse cardiac event.      2.  Pulm:  Airway feasible.  CHAKA risk: low. non smoker      3.  GI:  Risk of PONV score = 3.  If > 2, anti-emetic intervention recommended.   ~FAP with the above procedure planned       VTE risk: 1.8% (family history of VTE)     Patient is optimized and is acceptable candidate for the proposed procedure.  No further diagnostic evaluation is needed.      **For further details of assessment, testing, and physical exam please see H and P completed on same date.         SEBASTIAN Huff PA-C

## 2021-04-12 NOTE — PATIENT INSTRUCTIONS
Preparing for Your Surgery      Name:  Wally Varma   MRN:  [id number]   :  2000   Today's Date:  2021       Arriving for surgery:  Surgery date:  21  Arrival time:  5:30 am    Restrictions due to COVID 19:  One consistent visitor per patient is allowed.  The visitor will be allowed in the pre-op area.  Visitors are asked to leave the building during the surgery.  No ill visitors.  All visitors must wear face mask.    [employer] parking is available for anyone with mobility limitations or disabilities.  ([facility]  24 hours/ 7 days a week; [facility]  7 am- 3:30 pm, Mon- Fri)    Please come to:     [facility] [address]  [address] [place] [address]     -    On arrival to hospital, you will be asked some screening questions and directed to Patient Registration. Patient Registration will then give you further instructions.  [phone]?     - ?If you are in need of directions, wheelchair or escort please stop at the Information Desk in the lobby.  Inform the information person that you are here for surgery; a wheelchair and escort to [address] will be provided.?     What can I eat or drink?  -  Follow [facility] instructions for bowel prep and clear liquid diet.  -  You may have clear liquids until 2 hours before surgery. (Until 5:30 am)    Examples of clear liquids:  Water  Clear broth  Juices (apple, white grape, white cranberry  and cider) without pulp  Noncarbonated, powder based beverages  (lemonade and Keo-Aid)  Sodas (Sprite, 7-Up, ginger ale and seltzer)  Coffee or tea (without milk or cream)  Gatorade    -  No Alcohol for at least 24 hours before surgery     Which medicines can I take?  Hold Aspirin for 7 days before surgery.   Hold Multivitamins for 7 days before surgery.  Hold Supplements for 7 days before surgery.  Hold Ibuprofen (Advil, Motrin) for 1 day before surgery--unless otherwise directed by  surgeon.  Hold Naproxen (Aleve) for 4 days before surgery.    -  DO NOT take these medications the day of surgery:      -  PLEASE TAKE these medications the day of surgery:  Fexofenadine (Allegra) if needed  Acetaminophen (Tylenol) if needed    How do I prepare myself?  - Please take 2 showers before surgery using Scrubcare or Hibiclens soap.    Use this soap only from the neck to your toes.     Leave the soap on your skin for one minute--then rinse thoroughly.      You may use your own shampoo and conditioner; no other hair products.   - Please remove all jewelry and body piercings.  - No lotions, deodorants or fragrance.  - No makeup or fingernail polish.   - Bring your ID and insurance card.    - All patients are required to have a Covid-19 test within 4 days of surgery/procedure.      -Patients will be contacted by the [facility] scheduling team within 1 week of surgery to make an appointment.      - Patients may call the Scheduling team at [phone] if they have not been scheduled within 4 days of  surgery.      ALL PATIENTS GOING HOME THE SAME DAY OF SURGERY ARE REQUIRED TO HAVE A RESPONSIBLE ADULT TO DRIVE AND BE IN ATTENDANCE WITH THEM FOR 24 HOURS FOLLOWING SURGERY.    IF THE RESPONSIBLE ADULT IS REQUIRED FOR POST OP TEACHING THE POST OP RN WILL ASK THEM TO COME BACK TO THE RECOVERY AREA.    Questions or Concerns:    - For any questions regarding the day of surgery or your hospital stay, please contact the Pre Admission Nursing Office at [phone].       - If you have health changes between today and your surgery please call your surgeon.       For questions after surgery please call your surgeons office.     AFTER YOUR SURGERY  Breathing exercises   Breathing exercises help you recover faster. Take deep breaths and let the air out slowly. This will:     Help you wake up after surgery.    Help prevent complications like pneumonia.  Preventing complications will help you go home sooner.   We may  give you a breathing device (incentive spirometer) to encourage you to breathe deeply.   Nausea and vomiting   You may feel sick to your stomach after surgery; if so, let your nurse know.    Pain control:  After surgery, you may have pain. Our goal is to help you manage your pain. Pain medicine will help you feel comfortable enough to do activities that will help you heal.  These activities may include breathing exercises, walking and physical therapy.   To help your health care team treat your pain we will ask: 1) If you have pain  2) where it is located 3) describe your pain in your words  Methods of pain control include medications given by mouth, vein or by nerve block for some surgeries.  Sequential Compression Device (SCD):  You may need to wear SCD S (also called pneumo boots)on your legs or feet. These are wraps connected to a machine that pumps in air and releases it. The repeated pumping helps prevent blood clots from forming.

## 2021-04-12 NOTE — LETTER
2021       RE: Wally Varma  [address] [facility] [address]     Dear Colleague,    Thank you for referring your patient, Wally Varma, to the [facility] COLON AND RECTAL SURGERY CLINIC [place] at [facility]. Please see a copy of my visit note below.    Colon and Rectal Surgery Follow-up Clinic Note      RE: Wally Varma  : 2000  DIMAS: 2021    DIAGNOSIS: Familial adenomatous polyposis syndrome.     HPI:    21 y/o F who has been seen by [facility] since age 12 for purported history of FAP.     Initial colonoscopy in  showed 3 polyps, two years later she had 6, then 23, then 49.    She recently developed hematochezia.    Colonoscopy 2020 with 43 polyps:                    EGD 20:               Multiple small pedunculated and sessile polyps with no stigmata of                recent bleeding were found in the gastric body. This was biopsied with a                cold forceps for histology.     FINAL DIAGNOSIS:   A. STOMACH, ANTRUM, BIOPSY:   - Gastric antral mucosa with mild chronic inactive gastritis   - No H. pylori like organisms identified on routine staining   - Negative for intestinal metaplasia or dysplasia   B. STOMACH, BODY, POLYPECTOMY:   - Fundic gland polyp with low-grade dysplasia   - No evidence of high-grade dysplasia or malignancy      I had a virtual visit with Justa on 21 and recommended MR a/p and thyroid US and flexible sigmoidoscopy in clinic with plan for robotic total abdominal proctocolectomy with ileal pouch-anal anastomosis, diverting ileostomy, and flexible sigmoidoscopy.     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    MR Abdomen/Pelvis 21:  IMPRESSION:  No definite abdominal abnormality though images are not obtained in  the  "enterography protocol and the evaluation of the bowel is limited.    US Thyroid 2/25/21:  Impression:  Normal ultrasound evaluation of the thyroid.    FH: significant for colon cancer in a paternal second cousin in his 40s and a maternal great grandmother in her 70s.     INTERVAL HISTORY: Justa is here for flexible sigmoidoscopy.  Denies any new symptoms including rectal bleeding, fecal incontinence, or abdominal pain.  Her MR abdomen and pelvis and thyroid ultrasound were both normal.    Physical Examination:  /64 (BP Location: Left arm, Patient Position: Sitting, Cuff Size: Adult Regular)   Pulse 81   Temp 97.8  F (36.6  C) (Oral)   Ht 5' 3.25\"   Wt 133 lb 8 oz   LMP 04/01/2021 (Approximate)   SpO2 98%   BMI 23.46 kg/m    Abdomen soft, nontender.  No masses or inguinal adenopathy palpated.  Perianal skin intact.  Digital rectal exam: No masses palpated.  Flexible sigmoidoscopy: after obtaining informed consent and performing a \"time out\", an adult flexible sigmoidoscope was introduced through the anus and passed up to the mid sigmoid colon. The quality of the prep was good. Findings: 5-six 2-3 mm pedunculated polyps mainly at the proximal rectum.  These were all removed with a cold biopsy forceps.  Specimen retrieval was complete.  Bleeding was minimal.  Specimens were identified and sent for permanent pathology. There was no active ulceration, inflammation or bleeding. No additional abnormalities were seen. Total scope time: 15 minutes. The patient tolerated the procedure well.    ASSESSMENT  Attenuated FAP.  We discussed the role and impact of operative treatment.  Given that she has rectal sparing we discussed 2 approaches, total abdominal proctocolectomy with ileal pouch-anal anastomosis versus total abdominal colectomy with ileorectal anastomosis.  I did discuss with her the risk of ongoing neoplasia in the rectum with an increased risk of rectal cancer.  This has been reported in approximately " 20-30% of long-term studies.  We also discussed the functional advantages of an ileorectal anastomosis versus a an ileal J-pouch.  Specifically, the infertility risk of these 2 approaches.  Ileorectal anastomosis would require yearly flexible sigmoidoscopy.  We also discussed the long-term side effects and possible sequelae of an ileoanal anastomosis including but not limited to anastomotic leak, pouch failure requiring permanent fecal diversion, fecal seepage and incontinence, pouchitis, infertility, refractory diarrhea, recurrent bowel obstructions, dehydration, kidney failure, and kidney stones.  All questions were answered to her satisfaction.    PLAN  1.  Schedule laparoscopic total abdominal colectomy with flexible sigmoidoscopy.  Will need PAC, labs, and mechanical bowel prep with antibiotics.    30 minutes spent on the date of the encounter doing chart review, history and exam, documentation and further activities as noted above.    Wade Maguire M.D., M.Sc.     Division of Colon and Rectal Surgery  [facility]    Referring Provider:  Juan Ramon Lu DO  [facility] [place]  [address] [place] [address]      Primary Care Provider:  No primary care provider on file.      CC:  Flori Healy MD

## 2021-04-13 LAB — COPATH REPORT: NORMAL

## 2021-04-19 DIAGNOSIS — Z11.59 ENCOUNTER FOR SCREENING FOR OTHER VIRAL DISEASES: ICD-10-CM

## 2021-04-19 LAB
LABORATORY COMMENT REPORT: NORMAL
SARS-COV-2 RNA RESP QL NAA+PROBE: NEGATIVE
SARS-COV-2 RNA RESP QL NAA+PROBE: NORMAL
SPECIMEN SOURCE: NORMAL
SPECIMEN SOURCE: NORMAL

## 2021-04-19 PROCEDURE — U0005 INFEC AGEN DETEC AMPLI PROBE: HCPCS | Mod: 90 | Performed by: PATHOLOGY

## 2021-04-19 PROCEDURE — U0003 INFECTIOUS AGENT DETECTION BY NUCLEIC ACID (DNA OR RNA); SEVERE ACUTE RESPIRATORY SYNDROME CORONAVIRUS 2 (SARS-COV-2) (CORONAVIRUS DISEASE [COVID-19]), AMPLIFIED PROBE TECHNIQUE, MAKING USE OF HIGH THROUGHPUT TECHNOLOGIES AS DESCRIBED BY CMS-2020-01-R: HCPCS | Mod: 90 | Performed by: PATHOLOGY

## 2021-04-21 ENCOUNTER — PREP FOR PROCEDURE (OUTPATIENT)
Dept: SURGERY | Facility: CLINIC | Age: 21
End: 2021-04-21

## 2021-04-21 DIAGNOSIS — D13.91 FAP (FAMILIAL ADENOMATOUS POLYPOSIS): Primary | ICD-10-CM

## 2021-04-21 RX ORDER — POLYETHYLENE GLYCOL 3350 17 G/17G
238 POWDER, FOR SOLUTION ORAL SEE ADMIN INSTRUCTIONS
Qty: 14 PACKET | Refills: 0 | Status: ON HOLD | OUTPATIENT
Start: 2021-04-21 | End: 2021-04-23

## 2021-04-21 RX ORDER — METRONIDAZOLE 500 MG/1
500 TABLET ORAL EVERY 6 HOURS
Qty: 3 TABLET | Refills: 0 | Status: ON HOLD | OUTPATIENT
Start: 2021-04-21 | End: 2021-04-23

## 2021-04-21 RX ORDER — ONDANSETRON 4 MG/1
4 TABLET, FILM COATED ORAL EVERY 6 HOURS
Qty: 3 TABLET | Refills: 0 | Status: ON HOLD | OUTPATIENT
Start: 2021-04-21 | End: 2021-04-23

## 2021-04-21 RX ORDER — NEOMYCIN SULFATE 500 MG/1
1000 TABLET ORAL EVERY 6 HOURS
Qty: 6 TABLET | Refills: 0 | Status: ON HOLD | OUTPATIENT
Start: 2021-04-21 | End: 2021-04-23

## 2021-04-22 ENCOUNTER — HOSPITAL ENCOUNTER (INPATIENT)
Facility: CLINIC | Age: 21
LOS: 2 days | Discharge: HOME | End: 2021-04-24
Attending: COLON & RECTAL SURGERY | Admitting: COLON & RECTAL SURGERY
Payer: COMMERCIAL

## 2021-04-22 ENCOUNTER — ANESTHESIA (OUTPATIENT)
Dept: SURGERY | Facility: CLINIC | Age: 21
End: 2021-04-22
Payer: COMMERCIAL

## 2021-04-22 ENCOUNTER — ANCILLARY PROCEDURE (OUTPATIENT)
Dept: ULTRASOUND IMAGING | Facility: CLINIC | Age: 21
End: 2021-04-22
Payer: COMMERCIAL

## 2021-04-22 DIAGNOSIS — G89.18 ACUTE POST-OPERATIVE PAIN: Primary | ICD-10-CM

## 2021-04-22 DIAGNOSIS — D13.91 FAP (FAMILIAL ADENOMATOUS POLYPOSIS): ICD-10-CM

## 2021-04-22 LAB
ABO + RH BLD: NORMAL
ABO + RH BLD: NORMAL
BLD GP AB SCN SERPL QL: NORMAL
BLOOD BANK CMNT PATIENT-IMP: NORMAL
BLOOD BANK CMNT PATIENT-IMP: NORMAL
GLUCOSE BLDC GLUCOMTR-MCNC: 85 MG/DL (ref 70–99)
HCG UR QL: NEGATIVE
MAGNESIUM SERPL-MCNC: 1.9 MG/DL (ref 1.6–2.3)
PHOSPHATE SERPL-MCNC: 2.4 MG/DL (ref 2.5–4.5)
POTASSIUM SERPL-SCNC: 3.9 MMOL/L (ref 3.4–5.3)
SPECIMEN EXP DATE BLD: NORMAL

## 2021-04-22 PROCEDURE — 258N000003 HC RX IP 258 OP 636: Performed by: ANESTHESIOLOGY

## 2021-04-22 PROCEDURE — 250N000011 HC RX IP 250 OP 636

## 2021-04-22 PROCEDURE — 250N000011 HC RX IP 250 OP 636: Performed by: COLON & RECTAL SURGERY

## 2021-04-22 PROCEDURE — 0DTH0ZZ RESECTION OF CECUM, OPEN APPROACH: ICD-10-PCS | Performed by: COLON & RECTAL SURGERY

## 2021-04-22 PROCEDURE — 36415 COLL VENOUS BLD VENIPUNCTURE: CPT | Performed by: COLON & RECTAL SURGERY

## 2021-04-22 PROCEDURE — 84132 ASSAY OF SERUM POTASSIUM: CPT | Performed by: COLON & RECTAL SURGERY

## 2021-04-22 PROCEDURE — 0DBU4ZZ EXCISION OF OMENTUM, PERCUTANEOUS ENDOSCOPIC APPROACH: ICD-10-PCS | Performed by: COLON & RECTAL SURGERY

## 2021-04-22 PROCEDURE — 360N000077 HC SURGERY LEVEL 4, PER MIN: Performed by: COLON & RECTAL SURGERY

## 2021-04-22 PROCEDURE — 999N001017 HC STATISTIC GLUCOSE BY METER IP

## 2021-04-22 PROCEDURE — 710N000010 HC RECOVERY PHASE 1, LEVEL 2, PER MIN: Performed by: COLON & RECTAL SURGERY

## 2021-04-22 PROCEDURE — 88304 TISSUE EXAM BY PATHOLOGIST: CPT | Mod: 26 | Performed by: PATHOLOGY

## 2021-04-22 PROCEDURE — 250N000011 HC RX IP 250 OP 636: Performed by: ANESTHESIOLOGY

## 2021-04-22 PROCEDURE — 272N000001 HC OR GENERAL SUPPLY STERILE: Performed by: COLON & RECTAL SURGERY

## 2021-04-22 PROCEDURE — C9290 INJ, BUPIVACAINE LIPOSOME: HCPCS

## 2021-04-22 PROCEDURE — 258N000003 HC RX IP 258 OP 636: Performed by: NURSE ANESTHETIST, CERTIFIED REGISTERED

## 2021-04-22 PROCEDURE — 250N000013 HC RX MED GY IP 250 OP 250 PS 637: Performed by: COLON & RECTAL SURGERY

## 2021-04-22 PROCEDURE — 81025 URINE PREGNANCY TEST: CPT | Performed by: ANESTHESIOLOGY

## 2021-04-22 PROCEDURE — 88309 TISSUE EXAM BY PATHOLOGIST: CPT | Mod: 26 | Performed by: PATHOLOGY

## 2021-04-22 PROCEDURE — 370N000017 HC ANESTHESIA TECHNICAL FEE, PER MIN: Performed by: COLON & RECTAL SURGERY

## 2021-04-22 PROCEDURE — 250N000011 HC RX IP 250 OP 636: Performed by: NURSE ANESTHETIST, CERTIFIED REGISTERED

## 2021-04-22 PROCEDURE — 83735 ASSAY OF MAGNESIUM: CPT | Performed by: COLON & RECTAL SURGERY

## 2021-04-22 PROCEDURE — 120N000002 HC R&B MED SURG/OB UMMC

## 2021-04-22 PROCEDURE — 250N000025 HC SEVOFLURANE, PER MIN: Performed by: COLON & RECTAL SURGERY

## 2021-04-22 PROCEDURE — 999N000141 HC STATISTIC PRE-PROCEDURE NURSING ASSESSMENT: Performed by: COLON & RECTAL SURGERY

## 2021-04-22 PROCEDURE — 0DJD8ZZ INSPECTION OF LOWER INTESTINAL TRACT, VIA NATURAL OR ARTIFICIAL OPENING ENDOSCOPIC: ICD-10-PCS | Performed by: COLON & RECTAL SURGERY

## 2021-04-22 PROCEDURE — 88309 TISSUE EXAM BY PATHOLOGIST: CPT | Mod: TC | Performed by: COLON & RECTAL SURGERY

## 2021-04-22 PROCEDURE — 258N000003 HC RX IP 258 OP 636: Performed by: COLON & RECTAL SURGERY

## 2021-04-22 PROCEDURE — 250N000009 HC RX 250: Performed by: NURSE ANESTHETIST, CERTIFIED REGISTERED

## 2021-04-22 PROCEDURE — 84100 ASSAY OF PHOSPHORUS: CPT | Performed by: COLON & RECTAL SURGERY

## 2021-04-22 PROCEDURE — 88304 TISSUE EXAM BY PATHOLOGIST: CPT | Mod: TC | Performed by: COLON & RECTAL SURGERY

## 2021-04-22 RX ORDER — GRANISETRON HYDROCHLORIDE 1 MG/ML
1 INJECTION INTRAVENOUS ONCE
Status: COMPLETED | OUTPATIENT
Start: 2021-04-22 | End: 2021-04-22

## 2021-04-22 RX ORDER — CELECOXIB 200 MG/1
200 CAPSULE ORAL ONCE
Status: COMPLETED | OUTPATIENT
Start: 2021-04-22 | End: 2021-04-22

## 2021-04-22 RX ORDER — LIDOCAINE 40 MG/G
CREAM TOPICAL
Status: DISCONTINUED | OUTPATIENT
Start: 2021-04-22 | End: 2021-04-24 | Stop reason: HOSPADM

## 2021-04-22 RX ORDER — ERTAPENEM 1 G/1
1 INJECTION, POWDER, LYOPHILIZED, FOR SOLUTION INTRAMUSCULAR; INTRAVENOUS
Status: COMPLETED | OUTPATIENT
Start: 2021-04-22 | End: 2021-04-22

## 2021-04-22 RX ORDER — SODIUM CHLORIDE, SODIUM LACTATE, POTASSIUM CHLORIDE, CALCIUM CHLORIDE 600; 310; 30; 20 MG/100ML; MG/100ML; MG/100ML; MG/100ML
INJECTION, SOLUTION INTRAVENOUS CONTINUOUS PRN
Status: DISCONTINUED | OUTPATIENT
Start: 2021-04-22 | End: 2021-04-22

## 2021-04-22 RX ORDER — OXYCODONE HYDROCHLORIDE 10 MG/1
10 TABLET ORAL EVERY 4 HOURS PRN
Status: DISCONTINUED | OUTPATIENT
Start: 2021-04-22 | End: 2021-04-24 | Stop reason: HOSPADM

## 2021-04-22 RX ORDER — FENTANYL CITRATE 50 UG/ML
25-50 INJECTION, SOLUTION INTRAMUSCULAR; INTRAVENOUS
Status: DISCONTINUED | OUTPATIENT
Start: 2021-04-22 | End: 2021-04-22 | Stop reason: HOSPADM

## 2021-04-22 RX ORDER — NALOXONE HYDROCHLORIDE 0.4 MG/ML
0.2 INJECTION, SOLUTION INTRAMUSCULAR; INTRAVENOUS; SUBCUTANEOUS
Status: DISCONTINUED | OUTPATIENT
Start: 2021-04-22 | End: 2021-04-24 | Stop reason: HOSPADM

## 2021-04-22 RX ORDER — ERTAPENEM 1 G/1
1 INJECTION, POWDER, LYOPHILIZED, FOR SOLUTION INTRAMUSCULAR; INTRAVENOUS ONCE
Status: COMPLETED | OUTPATIENT
Start: 2021-04-23 | End: 2021-04-23

## 2021-04-22 RX ORDER — FENTANYL CITRATE 50 UG/ML
INJECTION, SOLUTION INTRAMUSCULAR; INTRAVENOUS PRN
Status: DISCONTINUED | OUTPATIENT
Start: 2021-04-22 | End: 2021-04-22

## 2021-04-22 RX ORDER — PROPOFOL 10 MG/ML
INJECTION, EMULSION INTRAVENOUS PRN
Status: DISCONTINUED | OUTPATIENT
Start: 2021-04-22 | End: 2021-04-22

## 2021-04-22 RX ORDER — NALOXONE HYDROCHLORIDE 0.4 MG/ML
0.2 INJECTION, SOLUTION INTRAMUSCULAR; INTRAVENOUS; SUBCUTANEOUS
Status: DISCONTINUED | OUTPATIENT
Start: 2021-04-22 | End: 2021-04-22 | Stop reason: HOSPADM

## 2021-04-22 RX ORDER — DEXMEDETOMIDINE HYDROCHLORIDE 4 UG/ML
0.2-1.2 INJECTION, SOLUTION INTRAVENOUS CONTINUOUS
Status: DISCONTINUED | OUTPATIENT
Start: 2021-04-22 | End: 2021-04-22 | Stop reason: HOSPADM

## 2021-04-22 RX ORDER — LIDOCAINE HYDROCHLORIDE 20 MG/ML
INJECTION, SOLUTION INFILTRATION; PERINEURAL PRN
Status: DISCONTINUED | OUTPATIENT
Start: 2021-04-22 | End: 2021-04-22

## 2021-04-22 RX ORDER — DEXAMETHASONE SODIUM PHOSPHATE 4 MG/ML
INJECTION, SOLUTION INTRA-ARTICULAR; INTRALESIONAL; INTRAMUSCULAR; INTRAVENOUS; SOFT TISSUE PRN
Status: DISCONTINUED | OUTPATIENT
Start: 2021-04-22 | End: 2021-04-22

## 2021-04-22 RX ORDER — KETOROLAC TROMETHAMINE 15 MG/ML
15 INJECTION, SOLUTION INTRAMUSCULAR; INTRAVENOUS EVERY 6 HOURS PRN
Status: DISCONTINUED | OUTPATIENT
Start: 2021-04-22 | End: 2021-04-23

## 2021-04-22 RX ORDER — ONDANSETRON 4 MG/1
4 TABLET, ORALLY DISINTEGRATING ORAL EVERY 30 MIN PRN
Status: DISCONTINUED | OUTPATIENT
Start: 2021-04-22 | End: 2021-04-22 | Stop reason: HOSPADM

## 2021-04-22 RX ORDER — OXYCODONE HYDROCHLORIDE 5 MG/1
5 TABLET ORAL EVERY 4 HOURS PRN
Status: DISCONTINUED | OUTPATIENT
Start: 2021-04-22 | End: 2021-04-24 | Stop reason: HOSPADM

## 2021-04-22 RX ORDER — NALOXONE HYDROCHLORIDE 0.4 MG/ML
0.4 INJECTION, SOLUTION INTRAMUSCULAR; INTRAVENOUS; SUBCUTANEOUS
Status: DISCONTINUED | OUTPATIENT
Start: 2021-04-22 | End: 2021-04-22 | Stop reason: HOSPADM

## 2021-04-22 RX ORDER — SODIUM CHLORIDE, SODIUM LACTATE, POTASSIUM CHLORIDE, CALCIUM CHLORIDE 600; 310; 30; 20 MG/100ML; MG/100ML; MG/100ML; MG/100ML
INJECTION, SOLUTION INTRAVENOUS CONTINUOUS
Status: DISCONTINUED | OUTPATIENT
Start: 2021-04-22 | End: 2021-04-22 | Stop reason: HOSPADM

## 2021-04-22 RX ORDER — HYDROMORPHONE HYDROCHLORIDE 1 MG/ML
.3-.5 INJECTION, SOLUTION INTRAMUSCULAR; INTRAVENOUS; SUBCUTANEOUS EVERY 10 MIN PRN
Status: DISCONTINUED | OUTPATIENT
Start: 2021-04-22 | End: 2021-04-22 | Stop reason: HOSPADM

## 2021-04-22 RX ORDER — ONDANSETRON 2 MG/ML
4 INJECTION INTRAMUSCULAR; INTRAVENOUS EVERY 30 MIN PRN
Status: DISCONTINUED | OUTPATIENT
Start: 2021-04-22 | End: 2021-04-22 | Stop reason: HOSPADM

## 2021-04-22 RX ORDER — LIDOCAINE 40 MG/G
CREAM TOPICAL
Status: DISCONTINUED | OUTPATIENT
Start: 2021-04-22 | End: 2021-04-22 | Stop reason: HOSPADM

## 2021-04-22 RX ORDER — MEPERIDINE HYDROCHLORIDE 25 MG/ML
12.5 INJECTION INTRAMUSCULAR; INTRAVENOUS; SUBCUTANEOUS
Status: DISCONTINUED | OUTPATIENT
Start: 2021-04-22 | End: 2021-04-22 | Stop reason: HOSPADM

## 2021-04-22 RX ORDER — ONDANSETRON 2 MG/ML
INJECTION INTRAMUSCULAR; INTRAVENOUS PRN
Status: DISCONTINUED | OUTPATIENT
Start: 2021-04-22 | End: 2021-04-22

## 2021-04-22 RX ORDER — ACETAMINOPHEN 500 MG
1000 TABLET ORAL 4 TIMES DAILY
Status: DISCONTINUED | OUTPATIENT
Start: 2021-04-22 | End: 2021-04-24 | Stop reason: HOSPADM

## 2021-04-22 RX ORDER — SODIUM CHLORIDE, SODIUM LACTATE, POTASSIUM CHLORIDE, CALCIUM CHLORIDE 600; 310; 30; 20 MG/100ML; MG/100ML; MG/100ML; MG/100ML
INJECTION, SOLUTION INTRAVENOUS CONTINUOUS
Status: DISCONTINUED | OUTPATIENT
Start: 2021-04-22 | End: 2021-04-23

## 2021-04-22 RX ORDER — BUPIVACAINE HYDROCHLORIDE 2.5 MG/ML
INJECTION, SOLUTION EPIDURAL; INFILTRATION; INTRACAUDAL PRN
Status: DISCONTINUED | OUTPATIENT
Start: 2021-04-22 | End: 2021-04-22

## 2021-04-22 RX ORDER — FLUMAZENIL 0.1 MG/ML
0.2 INJECTION, SOLUTION INTRAVENOUS
Status: DISCONTINUED | OUTPATIENT
Start: 2021-04-22 | End: 2021-04-22 | Stop reason: HOSPADM

## 2021-04-22 RX ORDER — HYDROMORPHONE HCL IN WATER/PF 6 MG/30 ML
0.2 PATIENT CONTROLLED ANALGESIA SYRINGE INTRAVENOUS
Status: DISCONTINUED | OUTPATIENT
Start: 2021-04-22 | End: 2021-04-24

## 2021-04-22 RX ORDER — NALOXONE HYDROCHLORIDE 0.4 MG/ML
0.4 INJECTION, SOLUTION INTRAMUSCULAR; INTRAVENOUS; SUBCUTANEOUS
Status: DISCONTINUED | OUTPATIENT
Start: 2021-04-22 | End: 2021-04-24 | Stop reason: HOSPADM

## 2021-04-22 RX ORDER — HYDROMORPHONE HYDROCHLORIDE 1 MG/ML
0.4 INJECTION, SOLUTION INTRAMUSCULAR; INTRAVENOUS; SUBCUTANEOUS
Status: DISCONTINUED | OUTPATIENT
Start: 2021-04-22 | End: 2021-04-24

## 2021-04-22 RX ORDER — LORAZEPAM 2 MG/ML
0.5 INJECTION INTRAMUSCULAR EVERY 6 HOURS PRN
Status: DISCONTINUED | OUTPATIENT
Start: 2021-04-22 | End: 2021-04-24

## 2021-04-22 RX ADMIN — SODIUM CHLORIDE, POTASSIUM CHLORIDE, SODIUM LACTATE AND CALCIUM CHLORIDE: 600; 310; 30; 20 INJECTION, SOLUTION INTRAVENOUS at 06:58

## 2021-04-22 RX ADMIN — ENOXAPARIN SODIUM 40 MG: 100 INJECTION SUBCUTANEOUS at 06:59

## 2021-04-22 RX ADMIN — GRANISETRON HYDROCHLORIDE 1 MG: 1 INJECTION, SOLUTION INTRAVENOUS at 06:57

## 2021-04-22 RX ADMIN — ROCURONIUM BROMIDE 10 MG: 10 INJECTION INTRAVENOUS at 11:20

## 2021-04-22 RX ADMIN — FENTANYL CITRATE 25 MCG: 50 INJECTION, SOLUTION INTRAMUSCULAR; INTRAVENOUS at 10:01

## 2021-04-22 RX ADMIN — KETOROLAC TROMETHAMINE 15 MG: 15 INJECTION, SOLUTION INTRAMUSCULAR; INTRAVENOUS at 20:35

## 2021-04-22 RX ADMIN — SODIUM CHLORIDE, POTASSIUM CHLORIDE, SODIUM LACTATE AND CALCIUM CHLORIDE: 600; 310; 30; 20 INJECTION, SOLUTION INTRAVENOUS at 19:03

## 2021-04-22 RX ADMIN — SODIUM CHLORIDE, POTASSIUM CHLORIDE, SODIUM LACTATE AND CALCIUM CHLORIDE: 600; 310; 30; 20 INJECTION, SOLUTION INTRAVENOUS at 08:14

## 2021-04-22 RX ADMIN — ONDANSETRON 4 MG: 2 INJECTION INTRAMUSCULAR; INTRAVENOUS at 11:52

## 2021-04-22 RX ADMIN — HYDROMORPHONE HYDROCHLORIDE 0.4 MG: 1 INJECTION, SOLUTION INTRAMUSCULAR; INTRAVENOUS; SUBCUTANEOUS at 20:36

## 2021-04-22 RX ADMIN — HYDROMORPHONE HYDROCHLORIDE 0.2 MG: 0.2 INJECTION, SOLUTION INTRAMUSCULAR; INTRAVENOUS; SUBCUTANEOUS at 18:54

## 2021-04-22 RX ADMIN — HYDROMORPHONE HYDROCHLORIDE 0.3 MG: 1 INJECTION, SOLUTION INTRAMUSCULAR; INTRAVENOUS; SUBCUTANEOUS at 13:54

## 2021-04-22 RX ADMIN — DEXMEDETOMIDINE HYDROCHLORIDE 0.5 MCG/KG/HR: 100 INJECTION, SOLUTION INTRAVENOUS at 09:00

## 2021-04-22 RX ADMIN — BUPIVACAINE HYDROCHLORIDE 20 ML: 2.5 INJECTION, SOLUTION EPIDURAL; INFILTRATION; INTRACAUDAL; PERINEURAL at 07:05

## 2021-04-22 RX ADMIN — ERTAPENEM SODIUM 1 G: 1 INJECTION, POWDER, LYOPHILIZED, FOR SOLUTION INTRAMUSCULAR; INTRAVENOUS at 08:38

## 2021-04-22 RX ADMIN — FENTANYL CITRATE 50 MCG: 50 INJECTION, SOLUTION INTRAMUSCULAR; INTRAVENOUS at 13:45

## 2021-04-22 RX ADMIN — BUPIVACAINE 20 ML: 13.3 INJECTION, SUSPENSION, LIPOSOMAL INFILTRATION at 07:05

## 2021-04-22 RX ADMIN — SODIUM CHLORIDE, POTASSIUM CHLORIDE, SODIUM LACTATE AND CALCIUM CHLORIDE: 600; 310; 30; 20 INJECTION, SOLUTION INTRAVENOUS at 08:30

## 2021-04-22 RX ADMIN — ROCURONIUM BROMIDE 30 MG: 10 INJECTION INTRAVENOUS at 09:00

## 2021-04-22 RX ADMIN — FENTANYL CITRATE 50 MCG: 50 INJECTION, SOLUTION INTRAMUSCULAR; INTRAVENOUS at 07:22

## 2021-04-22 RX ADMIN — HYDROMORPHONE HYDROCHLORIDE 0.2 MG: 0.2 INJECTION, SOLUTION INTRAMUSCULAR; INTRAVENOUS; SUBCUTANEOUS at 17:06

## 2021-04-22 RX ADMIN — CELECOXIB 200 MG: 200 CAPSULE ORAL at 06:57

## 2021-04-22 RX ADMIN — SODIUM CHLORIDE, POTASSIUM CHLORIDE, SODIUM LACTATE AND CALCIUM CHLORIDE: 600; 310; 30; 20 INJECTION, SOLUTION INTRAVENOUS at 11:22

## 2021-04-22 RX ADMIN — PHENYLEPHRINE HYDROCHLORIDE 100 MCG: 10 INJECTION INTRAVENOUS at 10:05

## 2021-04-22 RX ADMIN — SODIUM CHLORIDE, POTASSIUM CHLORIDE, SODIUM LACTATE AND CALCIUM CHLORIDE: 600; 310; 30; 20 INJECTION, SOLUTION INTRAVENOUS at 10:35

## 2021-04-22 RX ADMIN — ACETAMINOPHEN 1000 MG: 500 TABLET, FILM COATED ORAL at 19:43

## 2021-04-22 RX ADMIN — SODIUM CHLORIDE, POTASSIUM CHLORIDE, SODIUM LACTATE AND CALCIUM CHLORIDE: 600; 310; 30; 20 INJECTION, SOLUTION INTRAVENOUS at 08:50

## 2021-04-22 RX ADMIN — MIDAZOLAM 2 MG: 1 INJECTION INTRAMUSCULAR; INTRAVENOUS at 08:03

## 2021-04-22 RX ADMIN — ROCURONIUM BROMIDE 20 MG: 10 INJECTION INTRAVENOUS at 10:49

## 2021-04-22 RX ADMIN — PHENYLEPHRINE HYDROCHLORIDE 100 MCG: 10 INJECTION INTRAVENOUS at 12:05

## 2021-04-22 RX ADMIN — LIDOCAINE HYDROCHLORIDE 60 MG: 20 INJECTION, SOLUTION INFILTRATION; PERINEURAL at 08:18

## 2021-04-22 RX ADMIN — PROPOFOL 120 MG: 10 INJECTION, EMULSION INTRAVENOUS at 08:20

## 2021-04-22 RX ADMIN — SUGAMMADEX 200 MG: 100 INJECTION, SOLUTION INTRAVENOUS at 12:04

## 2021-04-22 RX ADMIN — MIDAZOLAM 2 MG: 1 INJECTION INTRAMUSCULAR; INTRAVENOUS at 07:19

## 2021-04-22 RX ADMIN — PHENYLEPHRINE HYDROCHLORIDE 150 MCG: 10 INJECTION INTRAVENOUS at 08:46

## 2021-04-22 RX ADMIN — PHENYLEPHRINE HYDROCHLORIDE 100 MCG: 10 INJECTION INTRAVENOUS at 10:57

## 2021-04-22 RX ADMIN — FENTANYL CITRATE 25 MCG: 50 INJECTION, SOLUTION INTRAMUSCULAR; INTRAVENOUS at 13:34

## 2021-04-22 RX ADMIN — ROCURONIUM BROMIDE 50 MG: 10 INJECTION INTRAVENOUS at 08:22

## 2021-04-22 RX ADMIN — DEXAMETHASONE SODIUM PHOSPHATE 10 MG: 4 INJECTION, SOLUTION INTRA-ARTICULAR; INTRALESIONAL; INTRAMUSCULAR; INTRAVENOUS; SOFT TISSUE at 09:41

## 2021-04-22 RX ADMIN — KETAMINE HYDROCHLORIDE 10 MG/HR: 10 INJECTION, SOLUTION INTRAMUSCULAR; INTRAVENOUS at 09:02

## 2021-04-22 RX ADMIN — PHENYLEPHRINE HYDROCHLORIDE 150 MCG: 10 INJECTION INTRAVENOUS at 08:29

## 2021-04-22 RX ADMIN — PROPOFOL 80 MG: 10 INJECTION, EMULSION INTRAVENOUS at 08:23

## 2021-04-22 ASSESSMENT — ACTIVITIES OF DAILY LIVING (ADL)
ADLS_ACUITY_SCORE: 15
TOILETING_ISSUES: NO
CONCENTRATING,_REMEMBERING_OR_MAKING_DECISIONS_DIFFICULTY: NO
ADLS_ACUITY_SCORE: 16

## 2021-04-22 ASSESSMENT — MIFFLIN-ST. JEOR: SCORE: 1330.13

## 2021-04-22 NOTE — PROGRESS NOTES
"  Colorectal Surgery Progress Note  Post Op Check    04/22/2021    Pt reports pain is moderately well controlled, but noting increasing pain from this afternoon. Think abdominal binder is helping. Ambulating. Feels better when upright in bed. Notes some pain with inspiration but no shortness of breath. Denies chest pain or dizziness. No BM, not yet passing gas. Voiding without Snider in place.    /46 (BP Location: Right arm)   Pulse 95   Temp 98.5  F (36.9  C) (Oral)   Resp 16   Ht 1.6 m (5' 3\")   Wt 60.1 kg (132 lb 7.9 oz)   LMP 04/01/2021 (Approximate)   SpO2 96%   BMI 23.47 kg/m      Gen: A&O x3, NAD, tired  Chest: breathing non-labored on room air  Abdomen: soft, non-tender, non-distended, abdominal binder in place  Incision: clean, dry, intact with dermabond  Extremities: warm and well perfused    A/P: 20-year-old pleasant young lady with history of FAP now POD0 s/p laparoscopic total abdominal colectomy with ileorectal anastomosis, partial omentectomy, takedown of splenic flexure and flexible sigmoidoscopy, recovering post-operatively    No acute post-op issues.     Please call with any questions.    Don Saavedra MD   PGY-1 Surgery  Pager [phone]      "

## 2021-04-22 NOTE — PROGRESS NOTES
Patient arrived on 7C from the PACU at 1445  2 RN skin assessment done with Jovita Dayne HARRIS and Layla JIMENEZ RN

## 2021-04-22 NOTE — ANESTHESIA POSTPROCEDURE EVALUATION
Patient: Wally Varma    Procedure(s):  SIGMOIDOSCOPY, FLEXIBLE  Laparoscopic assisted total abdominal colectomy with ileorectal anastomosis, takedown of splenic flexure  PARTIAL OMENTECTOMY    Diagnosis:FAP (familial adenomatous polyposis) [D12.6]  Diagnosis Additional Information: No value filed.    Anesthesia Type:  General    Note:  Disposition: Admission   Postop Pain Control: Uneventful            Sign Out: Well controlled pain   PONV: No   Neuro/Psych: Uneventful            Sign Out: Acceptable/Baseline neuro status   Airway/Respiratory: Uneventful            Sign Out: Acceptable/Baseline resp. status   CV/Hemodynamics: Uneventful            Sign Out: Acceptable CV status   Other NRE: NONE   DID A NON-ROUTINE EVENT OCCUR? No           Last vitals:  Vitals:    04/22/21 1415 04/22/21 1430 04/22/21 1445   BP: 100/51 100/52 116/63   Pulse: 91 93    Resp: 16 15 16   Temp: 37.3  C (99.1  F)  37.3  C (99.1  F)   SpO2: 99% 98%        Last vitals prior to Anesthesia Care Transfer:  CRNA VITALS  4/22/2021 1202 - 4/22/2021 1302      4/22/2021             NIBP:  111/73    Pulse:  112    Temp:  35.5  C (95.9  F)    SpO2:  100 %    Resp Rate (observed):  12          Electronically Signed By: Rico Sky MD  April 22, 2021  2:54 PM

## 2021-04-22 NOTE — ANESTHESIA CARE TRANSFER NOTE
Patient: Wally Varma    Procedure(s):  SIGMOIDOSCOPY, FLEXIBLE  Laparoscopic assisted total abdominal colectomy with ileorectal anastomosis, takedown of splenic flexure  PARTIAL OMENTECTOMY    Diagnosis: FAP (familial adenomatous polyposis) [D12.6]  Diagnosis Additional Information: No value filed.    Anesthesia Type:   General     Note:    Oropharynx: oropharynx clear of all foreign objects and spontaneously breathing  Level of Consciousness: drowsy  Oxygen Supplementation: room air    Independent Airway: airway patency satisfactory and stable  Dentition: dentition unchanged  Vital Signs Stable: post-procedure vital signs reviewed and stable  Report to RN Given: handoff report given  Patient transferred to: PACU    Handoff Report: Identifed the Patient, Identified the Reponsible Provider, Reviewed the pertinent medical history, Discussed the surgical course, Reviewed Intra-OP anesthesia mangement and issues during anesthesia, Set expectations for post-procedure period and Allowed opportunity for questions and acknowledgement of understanding      Vitals: (Last set prior to Anesthesia Care Transfer)  CRNA VITALS  4/22/2021 1202 - 4/22/2021 1246      4/22/2021             NIBP:  111/73    Pulse:  112    Temp:  35.5  C (95.9  F)    SpO2:  100 %    Resp Rate (observed):  12        Electronically Signed By: LUIS gOden CRNA  April 22, 2021  12:46 PM

## 2021-04-22 NOTE — BRIEF OP NOTE
[facility]    Brief Operative Note    Pre-operative diagnosis: FAP (familial adenomatous polyposis) [D12.6]  Post-operative diagnosis Same as pre-operative diagnosis    Procedure: Procedure(s):  SIGMOIDOSCOPY, FLEXIBLE  Laparoscopic assisted total abdominal colectomy with ileorectal anastomosis, takedown of splenic flexure  PARTIAL OMENTECTOMY  Surgeon: Surgeon(s) and Role:  Panel 1:     * Wade Maguire MD - Primary  Panel 2:     * Wade Maguire MD - Primary  Anesthesia: Combined General with Block   Estimated blood loss: 20 mL  Drains: None  Specimens:   ID Type Source Tests Collected by Time Destination   A : Total abdominal colectomy  and partial omentectomy  Tissue Colon SURGICAL PATHOLOGY EXAM Wade Maguire MD 4/22/2021 10:50 AM    B : Anastomotic rings  Tissue Colon SURGICAL PATHOLOGY EXAM Wade Maguire MD 4/22/2021 11:25 AM      Findings:   Negative leak test. Minimal polyp burden in rectum. .  Complications: None.  Implants: * No implants in log *   UOP: 300 mL via bob

## 2021-04-22 NOTE — OP NOTE
Procedure Date: 04/22/2021.    PREOPERATIVE DIAGNOSIS:  Attenuated familial adenomatous polyposis.    POSTOPERATIVE DIAGNOSIS:  Attenuated familial adenomatous polyposis.    ANESTHESIA:  General endotracheal anesthesia plus bilateral TAP blocks.    PROCEDURE PERFORMED:     1.  Laparoscopic total abdominal colectomy with ileorectal anastomosis.  2.  Partial omentectomy.  3.  Takedown of splenic flexure.  4.  Flexible sigmoidoscopy.      SURGEON:  Wade Maguire MD    ASSISTANT:  Dimple Lowe MD (Colon and Rectal Surgery Fellow).    BRIEF HISTORY:  Wally is a 20-year-old pleasant young lady who has been followed at [facility] since the age of 12 for history of FAP.  Her initial colonoscopy in 2014 showed 3 polyps, but 2 years later, she had polyps ranging from 23-49 per colonoscopy.  She recently developed hematochezia and a colonoscopy on 12/14/2020 showed a total of 43 tubular adenomas from the cecum to the rectum.  There were 6 polyps in the cecum, 23 polyps in the ascending colon, 3 polyps in the transverse colon, 4 polyps in the descending colon, and 4 polyps in the sigmoid colon.  The rectum showed 1 tubular adenoma that was 3 mm in size.  All of these were negative for high-grade dysplasia or invasive malignancy.  Her last EGD in 08/2020 showed multiple small pedunculated sessile polyps with no stigmata of bleeding.  Biopsies were taken and these were negative for H. pylori or metaplasia.  When she saw me in clinic back in 02/2021, she was essentially asymptomatic.  Her family history was significant for colon cancer in a paternal second cousin in his 40s, and a maternal great grandmother in her 70s.  No previous thyroid ultrasounds.  She denied any previous anorectal operations or fecal incontinence.  Her case was discussed at a multidisciplinary colorectal cancer conference.  A flexible sigmoidoscopy was also repeated by me in clinic and this showed 5-6, 2-3 mm pedunculated polyps  "that were removed.  Given the low number of polyps in the rectum, we recommended a total abdominal colectomy with ileorectal anastomosis.  I did discuss with her the potential need in the future for a proctectomy with a J-pouch depending on her yearly surveillance colonoscopies.  We also discussed the functional advantages of an ileorectal anastomosis.  Wally is not seeking to be pregnant at this time, but she does wish to do this in the future and I told her about the potential advantages of fertility with an ileorectal anastomosis as well.  After discussing all of these and answering all of her questions, we agreed to proceed with a total abdominal colectomy with ileorectal anastomosis.  I thoroughly discussed the risks, benefits and alternatives of this operation with her and her mother and they agreed to proceed.    DESCRIPTION OF PROCEDURE:  After obtaining informed consent, the patient was brought to the operating room and placed in the modified lithotomy position.  General endotracheal anesthesia was gently induced without difficulty.  The patient underwent preoperative placement of bilateral TAP blocks.  She also received appropriate preoperative antibiotic prophylaxis as well as mechanical and chemical DVT prophylaxis.  Bilateral lower extremity pneumatic compression devices were applied, and all pressure points were cushioned.  A Snider catheter was inserted.  The abdomen was prepped and draped in the standard sterile fashion.  A \"time out\" was performed.  A 12 mm supraumbilical incision was placed and the peritoneal cavity was entered under direct vision.  A 12 mm balloon tipped trocar was then placed at this incision.  The pneumoperitoneum was established up to 15 mmHg without difficulty.  Additional trocars were placed at the left upper abdomen (5 mm x 1); suprapubic area (5 mm x 1), and at the right lower quadrant (GelPort mini with a 12 mm trocar as well as a 5 mm AirSeal trocar).  These were all " placed under direct vision without difficulty.  The patient was then positioned and the small bowel was retracted to the right upper abdomen.  A mesenteric window was dissected at the level of the upper rectum and the upper rectum was transected with a laparoscopic stapler (60 mm - gold load).  This was performed with one firing of the stapler.  After this, the mesentery was dissected in a lateral to medial fashion.  The left ureter was clearly identified and care was taken to not injure this structures during the dissection.  The mesentery was transected with a Harmonic scalpel.  The descending colon was also mobilized and the mesentery was transected with a Harmonic scalpel.  The splenic flexure was then taken down.  The omentum was dissected off of the transverse colon using the Harmonic scalpel and the transverse mesocolon was also transected using this device.  We identified the duodenal sweep during this dissection and care was taken to not injure this structure.  The patient also had a small splenule at the lower pole of her spleen and this was preserved.  The hepatic flexure was then taken down and all the lateral attachments of the ascending colon were dissected free.  The mesentery of the ascending colon was subsequently transected and care was taken to not injure the right ureter, which was clearly identified.  We then dissected the terminal ileum off of the retroperitoneum all the way up to the pancreas.  After corroborating that all of the colon had been mobilized, we extracted the entire colon through the GelPort mini.  Additional mesentery at the ileocecal valve was transected with the Harmonic scalpel.  We then selected an area of the terminal ileum to perform a side-to-end ileorectal anastomosis.  A colotomy was performed just proximal to the ileocecal valve and the anvil of a 29 mm EEA stapler was delivered to the antimesenteric portion of the terminal ileum.  After this, the ileum was transected  using an Endo-FERNANDO stapler (60 mm - gold load).  This staple line was reinforced with a running horizontal mattress 4-0 Monocryl suture.  The specimen was sent for open and return.  The anvil was then returned to the peritoneal cavity.  Pneumoperitoneum was reestablished.  A flexible sigmoidoscopy was then performed and hydropneumatic testing was performed of the state of the Endo-FERNANDO staple line and this was negative.  A 29 mm EEA stapler was then passed transanally all the way up to the right corner of the previous Endo-FERNANDO staple line and the post was delivered.  The anvil was then connected to the post and the post was retrieved.  Care was taken to assure that the ileal staple line was not incorporated into the EEA staple line and we only had one intersecting staple line on the rectum.  The EEA stapler was fired and retrieved.  Both anastomotic rings were intact and these were sent for permanent pathology.  Flexible sigmoidoscopy was repeated and the ileorectal anastomosis was located at approximately 15 cm from the anal verge.  It was widely patent and there was no evidence of bleeding.  Hydropneumatic testing was repeated and there was no air leakage.  The colonoscope was then removed.  We then tacked the afferent limb of the ileorectal anastomosis to the right pelvic sidewall with two 3-0 Vicryl sutures.  Hemostasis was corroborated.  Instrument, sponge, and needle counts were all correct as reported to me.  Pneumoperitoneum was desufflated and all trocars were removed under direct vision.  The supraumbilical trocar site was closed at the fascial level with 2 figure-of-eight 0 Vicryl sutures.  The GelPort mini site was closed at the peritoneum with a running 0 Vicryl suture.  The fascia was reapproximated with figure-of-eight #1 Prolene sutures.  The wounds were then irrigated with dilute peroxide and skin incisions were reapproximated with running 4-0 Monocryl subcuticular sutures and Exofin fusion.  The  Snider catheter was removed at the end of the case.  The patient tolerated the procedure well.    COMPLICATIONS:  None immediately.    ESTIMATED BLOOD LOSS:  20 mL    REPLACEMENT:  3000 mL of crystalloid.    DRAINS/TUBES None.    SPECIMENS:  Total abdominal colectomy with partial omentectomy and anastomotic rings.    FINDINGS:  29 mm side-to-end ileorectal anastomosis at 15 cm from the anal verge.  Negative hydropneumatic testing.    DISPOSITION:  PACU.    Wade Maguire MD        D: 2021   T: 2021   MT: LENNIE    Name:     ALETA BAEZA  MRN:      [id number]        Account:        [id number]   :      2000           Procedure Date: 2021     Document: [id number]    cc:  MD Juan Ramon Pacheco DO

## 2021-04-22 NOTE — ANESTHESIA PROCEDURE NOTES
Airway       Patient location during procedure: OR       Procedure Start/Stop Times: 4/22/2021 8:18 AM and 4/22/2021 8:25 AM  Staff -        CRNA: Jah Vaca APRN CRNA       Performed By: resident and CRNA  Consent for Airway        Urgency: elective  Indications and Patient Condition       Indications for airway management: zane-procedural       Induction type:intravenous      Final Airway Details       Final airway type: endotracheal airway       Successful airway: ETT - single  Endotracheal Airway Details        ETT size (mm): 6.5       Cuffed: yes       Cuff volume (mL): 3       Successful intubation technique: video laryngoscopy       VL Blade Size: MAC 3       Grade View of Cords: 2       Adjucts: stylet       Position: Right       Measured from: lips       Secured at (cm): 21       Bite block used: None    Post intubation assessment        Placement verified by: capnometry, equal breath sounds and chest rise        Number of attempts at approach: 1       Number of other approaches attempted: 0       Secured with: paper tape       Ease of procedure: easy       Dentition: Intact and Unchanged    Medication(s) Administered   Medication Administration Time: 4/22/2021 8:25 AM    Additional Comments       Medical student placed ETT under direction of LENORA.

## 2021-04-22 NOTE — PLAN OF CARE
Paged providers:    Patient had bloody, red, watery stool.    Provider stated she is not concerned about the stool.

## 2021-04-22 NOTE — ANESTHESIA PROCEDURE NOTES
TAP Procedure Note  Pre-Procedure   Staff -        Anesthesiologist:  Zahida Barksdale MD       Resident/Fellow: Chun Ramey MD       Performed By: resident       Location: OR       Procedure Start/Stop Times: 4/22/2021 7:15 AM and 4/22/2021 7:25 AM  Timeout:       Correct Patient: Yes        Correct Procedure: Yes        Correct Site: Yes        Correct Position: Yes        Correct Laterality: Yes        Site Marked: Yes  Procedure Documentation  Procedure: TAP       Laterality: bilateral       Patient Position: supine       Skin prep: Chloraprep       Insertion Site: T9-10.       Needle Type: Touhy needle       Needle Gauge: 21.        Needle Length (Inches): 3.13        - Ultrasound guided       - Ultrasound used to identify targeted nerve, plexus, vascular marker, or fascial plane and place a needle adjacent to it in real-time       - Ultrasound was used to visualize the spread of anesthetic in close proximity to the above referenced structure       - A permanent image is entered into the patient's record.       - There were no apparent abnormal pathologic findings    Assessment/Narrative         The placement was negative for: blood aspirated, painful injection and site bleeding       Paresthesias: No.      Bolus given via needle. No blood aspirated via catheter.        Secured via.        Insertion/Infusion Method: Single Shot       Complications: none

## 2021-04-23 ENCOUNTER — APPOINTMENT (OUTPATIENT)
Dept: OCCUPATIONAL THERAPY | Facility: CLINIC | Age: 21
End: 2021-04-23
Attending: COLON & RECTAL SURGERY
Payer: COMMERCIAL

## 2021-04-23 LAB — HGB BLD-MCNC: 12.1 G/DL (ref 11.7–15.7)

## 2021-04-23 PROCEDURE — 120N000002 HC R&B MED SURG/OB UMMC

## 2021-04-23 PROCEDURE — 97110 THERAPEUTIC EXERCISES: CPT | Mod: GO

## 2021-04-23 PROCEDURE — 250N000013 HC RX MED GY IP 250 OP 250 PS 637: Performed by: PHYSICIAN ASSISTANT

## 2021-04-23 PROCEDURE — 36415 COLL VENOUS BLD VENIPUNCTURE: CPT | Performed by: COLON & RECTAL SURGERY

## 2021-04-23 PROCEDURE — 97535 SELF CARE MNGMENT TRAINING: CPT | Mod: GO

## 2021-04-23 PROCEDURE — 97165 OT EVAL LOW COMPLEX 30 MIN: CPT | Mod: GO

## 2021-04-23 PROCEDURE — 250N000011 HC RX IP 250 OP 636: Performed by: COLON & RECTAL SURGERY

## 2021-04-23 PROCEDURE — 85018 HEMOGLOBIN: CPT | Performed by: COLON & RECTAL SURGERY

## 2021-04-23 PROCEDURE — 250N000013 HC RX MED GY IP 250 OP 250 PS 637: Performed by: COLON & RECTAL SURGERY

## 2021-04-23 PROCEDURE — 97530 THERAPEUTIC ACTIVITIES: CPT | Mod: GO

## 2021-04-23 RX ORDER — IBUPROFEN 800 MG/1
800 TABLET, FILM COATED ORAL EVERY 8 HOURS
Qty: 30 TABLET | Refills: 0 | Status: SHIPPED | OUTPATIENT
Start: 2021-04-24 | End: 2021-05-24

## 2021-04-23 RX ORDER — ACETAMINOPHEN 500 MG
1000 TABLET ORAL 4 TIMES DAILY
Qty: 70 TABLET | Refills: 0 | Status: SHIPPED | OUTPATIENT
Start: 2021-04-24 | End: 2021-05-24

## 2021-04-23 RX ORDER — OXYCODONE HYDROCHLORIDE 5 MG/1
5 TABLET ORAL EVERY 6 HOURS PRN
Qty: 18 TABLET | Refills: 0 | Status: SHIPPED | OUTPATIENT
Start: 2021-04-23 | End: 2021-04-24

## 2021-04-23 RX ORDER — IBUPROFEN 800 MG/1
800 TABLET, FILM COATED ORAL EVERY 8 HOURS
Status: DISCONTINUED | OUTPATIENT
Start: 2021-04-23 | End: 2021-04-24

## 2021-04-23 RX ORDER — OXYCODONE HYDROCHLORIDE 5 MG/1
5 TABLET ORAL EVERY 6 HOURS PRN
Qty: 18 TABLET | Refills: 0 | Status: SHIPPED | OUTPATIENT
Start: 2021-04-23 | End: 2021-04-23

## 2021-04-23 RX ADMIN — ACETAMINOPHEN 1000 MG: 500 TABLET, FILM COATED ORAL at 17:12

## 2021-04-23 RX ADMIN — OXYCODONE HYDROCHLORIDE 5 MG: 5 TABLET ORAL at 14:28

## 2021-04-23 RX ADMIN — OXYCODONE HYDROCHLORIDE 5 MG: 5 TABLET ORAL at 02:20

## 2021-04-23 RX ADMIN — OXYCODONE HYDROCHLORIDE 5 MG: 5 TABLET ORAL at 09:48

## 2021-04-23 RX ADMIN — KETOROLAC TROMETHAMINE 15 MG: 15 INJECTION, SOLUTION INTRAMUSCULAR; INTRAVENOUS at 07:48

## 2021-04-23 RX ADMIN — ACETAMINOPHEN 1000 MG: 500 TABLET, FILM COATED ORAL at 19:43

## 2021-04-23 RX ADMIN — IBUPROFEN 800 MG: 800 TABLET, FILM COATED ORAL at 19:43

## 2021-04-23 RX ADMIN — ACETAMINOPHEN 1000 MG: 500 TABLET, FILM COATED ORAL at 12:52

## 2021-04-23 RX ADMIN — OXYCODONE HYDROCHLORIDE 5 MG: 5 TABLET ORAL at 03:11

## 2021-04-23 RX ADMIN — ACETAMINOPHEN 1000 MG: 500 TABLET, FILM COATED ORAL at 07:48

## 2021-04-23 RX ADMIN — OXYCODONE HYDROCHLORIDE 5 MG: 5 TABLET ORAL at 23:20

## 2021-04-23 RX ADMIN — ERTAPENEM SODIUM 1 G: 1 INJECTION, POWDER, LYOPHILIZED, FOR SOLUTION INTRAMUSCULAR; INTRAVENOUS at 07:48

## 2021-04-23 RX ADMIN — ENOXAPARIN SODIUM 40 MG: 100 INJECTION SUBCUTANEOUS at 12:52

## 2021-04-23 RX ADMIN — OXYCODONE HYDROCHLORIDE 5 MG: 5 TABLET ORAL at 18:42

## 2021-04-23 ASSESSMENT — ACTIVITIES OF DAILY LIVING (ADL)
ADLS_ACUITY_SCORE: 16
ADLS_ACUITY_SCORE: 16
PREVIOUS_RESPONSIBILITIES: MEAL PREP;HOUSEKEEPING;LAUNDRY;SHOPPING;MEDICATION MANAGEMENT;FINANCES;DRIVING;WORK
ADLS_ACUITY_SCORE: 16

## 2021-04-23 NOTE — PLAN OF CARE
PT 7C: Defer - PT orders acknowledged and appreciated. Following chart review and discussion with OT after their evaluation, pt does not require IP PT. Pt mobilizing at SBA and will only need 1 discipline to follow during admission. Will complete orders. Thank you for your referral.

## 2021-04-23 NOTE — PROGRESS NOTES
Assumed care of patient [id number]. VSS. Minimal abdominal pain managed with scheduled Tylenol and Advil. Tolerating small amount of low fiber diet. Passing gas, had accidental incontinent bowel movement while asleep. Up independently in room. Lap sites open to air, c/d/i.   Continue with POC.

## 2021-04-23 NOTE — PROGRESS NOTES
04/23/21 0913   Quick Adds   Type of Visit Initial Occupational Therapy Evaluation   Living Environment   People in home parent(s)   Current Living Arrangements house   Home Accessibility stairs to enter home;stairs within home   Number of Stairs, Within Home, Primary   (12)   Transportation Anticipated family or friend will provide   Living Environment Comments Currently living w/parents in house w/older dog. 4 ASHANTI without railings, 12 steps to basement where pt's bedroom is. Has walk in shower in  basement and tub shower on main level. No GB. Laundry is in the basement.    Self-Care   Usual Activity Tolerance excellent   Current Activity Tolerance moderate   Regular Exercise   (not outside of daily activities)   Equipment Currently Used at Home none   Activity/Exercise/Self-Care Comment Pt reports working as a  and manages a grocery store. Anticipates returning to work in ~6 weeks part time. Enjoys being outside. Has a puppy that her boyfriend is taking care of.    Disability/Function   Hearing Difficulty or Deaf no   Wear Glasses or Blind no   Concentrating, Remembering or Making Decisions Difficulty no   Difficulty Communicating no   Difficulty Eating/Swallowing no   Walking or Climbing Stairs Difficulty no   Dressing/Bathing Difficulty no   Toileting issues no   Doing Errands Independently Difficulty (such as shopping) no   Fall history within last six months no   Change in Functional Status Since Onset of Current Illness/Injury yes   General Information   Onset of Illness/Injury or Date of Surgery 04/22/21   Referring Physician Wade Maguire MD   Patient/Family Therapy Goal Statement (OT) To return home   Additional Occupational Profile Info/Pertinent History of Current Problem This is a 20 year old female with attenuated FAP now S/p Lap TAC w/ ileorectal anastomosis; flex sig on 4/22.   Existing Precautions/Restrictions abdominal   Left Upper Extremity (Weight-bearing Status)   (10#  restrictions)   Right Upper Extremity (Weight-bearing Status)   (10# restrictions)   General Observations and Info Activity: Up in chair   Cognitive Status Examination   Orientation Status orientation to person, place and time   Cognitive Status Comments No deficits noted   Visual Perception   Impact of Vision Impairment on Function (Vision) no acute changes   Sensory   Sensory Quick Adds No deficits were identified   Pain Assessment   Patient Currently in Pain Yes, see Vital Sign flowsheet   Integumentary/Edema   Integumentary/Edema Comments Mild edema in R hand likely r/t IV   Range of Motion Comprehensive   Comment, General Range of Motion B UE WNL   Strength Comprehensive (MMT)   Comment, General Manual Muscle Testing (MMT) Assessment Deferred 2/2 abdominal precautions, functional at least   Coordination   Coordination Comments Difficulty with opposition on R hand 2/2 edema   Bed Mobility   Comment (Bed Mobility) SBA, HOB flat, cues for log roll   Transfers   Transfer Comments SBA   Lower Body Dressing Assessment/Training   Comment (Lower Body Dressing) SBA w/ v/c   Instrumental Activities of Daily Living (IADL)   Previous Responsibilities meal prep;housekeeping;laundry;shopping;medication management;finances;driving;work   IADL Comments Parents will be able to help with IADLs PRN.    Clinical Impression   Criteria for Skilled Therapeutic Interventions Met (OT) yes   OT Diagnosis Decreased ADL I   OT Problem List-Impairments impacting ADL problems related to;activity tolerance impaired;pain;post-surgical precautions   ADL comments/analysis Decreased ADL I   Assessment of Occupational Performance 1-3 Performance Deficits   Identified Performance Deficits home management, work, showering   Planned Therapy Interventions (OT) ADL retraining;IADL retraining;bed mobility training;transfer training;home program guidelines;progressive activity/exercise;risk factor education   Clinical Decision Making Complexity (OT) low  complexity   Therapy Frequency (OT) Daily  (x 1-3 tx)   Predicted Duration of Therapy 4 days   Risk & Benefits of therapy have been explained evaluation/treatment results reviewed;care plan/treatment goals reviewed;participants voiced agreement with care plan   Comment-Clinical Impression Pt to benefit from skilled OT to address above deficits. See daily flowsheet for details regarding tx provided.    OT Discharge Planning    OT Discharge Recommendation (DC Rec) Home with assist   OT Rationale for DC Rec Pt is moving well, limited by pain and post op precautions.    OT Brief overview of current status  Ambulates x ~150' x 2 with UE support on IV pole. Able to demo figure four for LB dressing. Reviewed log roll. Encouraged to ambulate w/nursing staff. VSS on RA.    Total Evaluation Time (Minutes)   Total Evaluation Time (Minutes) 5

## 2021-04-23 NOTE — PROGRESS NOTES
Colorectal Surgery Progress Note  [facility]  POD#1      Subjective:  Doing well.  Denies nausea.  Tolerating sips.  Passed both gas and stool.  Walking and working on IS.  Taking minimal pain meds.  Does feel some abd pressure.     Vitals:  Vitals:    04/22/21 1700 04/22/21 1800 04/22/21 2204 04/23/21 0612   BP: 108/61 103/48 101/40 107/53   BP Location:   Right arm Right arm   Cuff Size:       Pulse: 89 89 87 83   Resp: 20 19 20 19   Temp:   98.5  F (36.9  C) 98.4  F (36.9  C)   TempSrc:   Oral Oral   SpO2: 97% 99% 93% 100%   Weight:       Height:         I/O:  I/O last 3 completed shifts:  In: 4130 [I.V.:4130]  Out: 360 [Urine:340; Blood:20]    Physical Exam:  Gen: AAOx3, NAD  Pulm: Non-labored breathing  Abd: Soft, non-distended, appropriately tender, no guarding/rebound   Incision C/D/I   Ext:  Warm and well-perfused    BMP  Recent Labs   Lab 04/22/21  1310   POTASSIUM 3.9   MAG 1.9   PHOS 2.4*     CBC  Recent Labs   Lab 04/23/21  0656   HGB 12.1         ASSESSMENT: This is a 20 year old female with attenuated FAP now S/p Lap TAC w/ ileorectal anastomosis; flex sig on 4/22.      Neuro/Pain: tylenol, toradol, oxy, IV dilaudid  CV: no acute issues  PULM: encourage IS.  GI/FEN:   - adv to LRD  - decrease IVF  - ambulate  : no acute issues  Heme:  Hgb 13 to 12.  ID: no acute issues  Endocrine: no acute issues    Activity: as tolerated.  Ppx: lovenox ppx  Dispo: pending ability to ilir diet and pain control. Poss later today vs tomorrow.  Does not need lovenox on discharge.       Khadra Griffiths PA-C   Colon and Rectal Surgery  [id number]     Patient was seen and discussed with Dr. Lowe

## 2021-04-23 NOTE — PLAN OF CARE
POD 1. AVSS. Pain managed with IV Dilaudid, Oxycodone, Toradol and Tylenol. Denies nausea, on a full liquid diet. PIV x2, MIVF and SL. Lap sites J CARLOS/CDI. Adequate UOP. Passing gas, BM x1 this shift. Up with 1A-SBA.   Continue POC

## 2021-04-23 NOTE — PROGRESS NOTES
"CLINICAL NUTRITION SERVICES  Reason for Assessment:  1. Nutrition education regarding low fiber diet education  2. Pt with Malnutrition screening tool score of 3 (2 points from answering \"unsure\" to recent weight loss)  Weight has been trending up the past 2 months, does not meet criteria for above nutrition risk screen for full nutrition assessment.  Will complete low fiber diet education.  Wt Readings from Last 15 Encounters:   04/22/21 60.1 kg (132 lb 7.9 oz)   04/12/21 61 kg (134 lb 6.4 oz)   04/12/21 60.6 kg (133 lb 8 oz)   02/08/21 54.4 kg (120 lb)   08/10/20 54.4 kg (120 lb)   07/20/20 54.9 kg (121 lb)   03/11/20 54 kg (119 lb)   12/09/19 51.3 kg (113 lb)      Diet History:  Patient received low fiber diet education pre-op from OP RD on 4/12/21.  Pt reports good understanding, did have a couple clarifying questions.   Nutrition Diagnosis:  Food- and nutrition-related knowledge deficit r/t limited knowledge of low fiber diet as evidenced by patient report  Interventions:  Provided instruction on low fiber diet. Discussed each food group and foods to eat and avoid. Answered patient's questions regarding diet.   Provided handouts : Fiber-Restricted Nutrition Therapy and Low Fiber Diet Hospital Menu  Goals:   Patient will verbalize at least 5 low fiber foods acceptable on diet and 5 high fiber foods to avoid.  Follow-up:    Patient to ask any further nutrition-related questions before discharge.  In addition, pt may request outpatient RD appointment.      Rox Gonzalez RD, LD  Pager: [phone]  Units covered: 7C (all beds) and 5A (beds 5499 through 5259-2)   "

## 2021-04-23 NOTE — DISCHARGE SUMMARY
"[facility]  Discharge Summary  Colon and Rectal Surgery     Wally Varma MRN# [id number]   YOB: 2000 Age: 20 year old     Date of Admission:  4/22/2021  Date of Discharge::  4/24/2021  5:20 PM  Admitting Physician:  Wade Maguire MD  Discharge Physician:  Wade Maguire MD  Primary Care Physician:        Courtney Jenkins          Admission Diagnoses:   FAP (familial adenomatous polyposis) [D12.6]          Discharge Diagnosis:   Attenuated familial adenomatous polyposis         Procedures:   4/22/2021:  PROCEDURE PERFORMED:     1.  Laparoscopic total abdominal colectomy with ileorectal anastomosis.  2.  Partial omentectomy.  3.  Takedown of splenic flexure.  4.  Flexible sigmoidoscopy.             Consultations:   NUTRITION SERVICES ADULT IP CONSULT  OCCUPATIONAL THERAPY ADULT IP CONSULT  PHYSICAL THERAPY ADULT IP CONSULT  SOCIAL WORK IP CONSULT         Imaging Studies:     Results for orders placed or performed during the hospital encounter of 04/22/21   POC US Guidance Needle Placement    Impression    Bilateral TAP Block           Medications Prior to Admission:     Medications Prior to Admission   Medication Sig Dispense Refill Last Dose     fexofenadine (ALLEGRA) 60 MG tablet Take 60 mg by mouth 2 times daily as needed    Past Month at Unknown time     [DISCONTINUED] bisacodyl (DULCOLAX) 5 MG EC tablet Take 1 tablet (5 mg) by mouth See Admin Instructions --Day prior to procedure: Take 1 tablet bisacodyl at 12:00. (Patient not taking: Reported on 9/21/2020) 1 tablet 0      [DISCONTINUED] magnesium citrate solution Take 296 mLs by mouth once for 1 dose Start at 8 pm night before surgery, unless otherwise stated in \"Getting Ready for Surgery\" instruction 296 mL 0      [DISCONTINUED] metroNIDAZOLE (FLAGYL) 500 MG tablet Take 1 tablet (500 mg) by mouth every 6 hours At 8:00 am, 2:00 pm, 8:00 pm the day prior to your surgery " with neomycin and zofran. 3 tablet 0      [DISCONTINUED] metroNIDAZOLE (FLAGYL) 500 MG tablet Take 1 tablet (500 mg) by mouth every 6 hours At 8:00 am, 2:00 pm, 8:00 pm the day prior to your surgery with neomycin and zofran. 3 tablet 0 4/21/2021 at 2200     [DISCONTINUED] Na Sulfate-K Sulfate-Mg Sulf (SUPREP BOWEL PREP) solution Take 177 mLs (1 Bottle) by mouth See Admin Instructions -Evening before procedure complete steps 1 through 4 (see package) using (1) 6 ounce bottle before bed.  Morning of procedure, repeat steps 1 through 4 using the other 6 ounce bottle. (Patient not taking: Reported on 9/21/2020) 2 Bottle 0      [DISCONTINUED] neomycin (MYCIFRADIN) 500 MG tablet Take 2 tablets (1,000 mg) by mouth every 6 hours At 8:00 am, 2:00 pm, 8:00 pm the day prior to your surgery with flagyl and zofran. 6 tablet 0      [DISCONTINUED] neomycin (MYCIFRADIN) 500 MG tablet Take 2 tablets (1,000 mg) by mouth every 6 hours At 8:00 am, 2:00 pm, 8:00 pm the day prior to your surgery with flagyl and zofran. 6 tablet 0 4/21/2021 at 2200     [DISCONTINUED] ondansetron (ZOFRAN) 4 MG tablet Take 1 tablet (4 mg) by mouth every 6 hours At 8:00 am, 2:00 pm, 8:00 pm the day prior to your surgery with neomycin and flagyl. 3 tablet 0 4/21/2021 at 2200     [DISCONTINUED] ondansetron (ZOFRAN) 4 MG tablet Take 1 tablet (4 mg) by mouth every 6 hours At 8:00 am, 2:00 pm, 8:00 pm the day prior to your surgery with neomycin and flagyl. 3 tablet 0      [DISCONTINUED] polyethylene glycol (GOLYTELY) 236 g suspension Take 4,000 mLs (4 L) by mouth See Admin Instructions -Day prior to procedure: 1. Take 1 tablet bisacodyl at 12:00.  2. Mix GoLytely as directed on container.  3.  At 6:00 PM, drink an 8 oz. glass of solution and continue drinking an 8 oz. glass every 15 minutes until bottle is empty. (Patient not taking: Reported on 9/21/2020) 4 L 0      [DISCONTINUED] polyethylene glycol (MIRALAX) 17 g packet Take 238 g by mouth See Admin  "Instructions Starting at 4 pm night prior to surgery. Refer to \"Getting Ready for Surgery\" instructions. 14 packet 0      [DISCONTINUED] polyethylene glycol (MIRALAX) 17 g packet Take 238 g by mouth See Admin Instructions Starting at 4 pm night prior to surgery. Refer to \"Getting Ready for Surgery\" instructions. 14 packet 0 4/21/2021 at 2000            Discharge Medications:     Current Discharge Medication List      START taking these medications    Details   acetaminophen (TYLENOL) 500 MG tablet Take 2 tablets (1,000 mg) by mouth 4 times daily  Qty: 70 tablet, Refills: 0    Associated Diagnoses: FAP (familial adenomatous polyposis)      ibuprofen (ADVIL/MOTRIN) 800 MG tablet Take 1 tablet (800 mg) by mouth every 8 hours  Qty: 30 tablet, Refills: 0    Associated Diagnoses: Acute post-operative pain      oxyCODONE (ROXICODONE) 5 MG tablet Take 1 tablet (5 mg) by mouth every 6 hours as needed for moderate to severe pain  Qty: 18 tablet, Refills: 0    Associated Diagnoses: FAP (familial adenomatous polyposis)         CONTINUE these medications which have NOT CHANGED    Details   fexofenadine (ALLEGRA) 60 MG tablet Take 60 mg by mouth 2 times daily as needed          STOP taking these medications       bisacodyl (DULCOLAX) 5 MG EC tablet Comments:   Reason for Stopping:         magnesium citrate solution Comments:   Reason for Stopping:         metroNIDAZOLE (FLAGYL) 500 MG tablet Comments:   Reason for Stopping:         metroNIDAZOLE (FLAGYL) 500 MG tablet Comments:   Reason for Stopping:         Na Sulfate-K Sulfate-Mg Sulf (SUPREP BOWEL PREP) solution Comments:   Reason for Stopping:         neomycin (MYCIFRADIN) 500 MG tablet Comments:   Reason for Stopping:         neomycin (MYCIFRADIN) 500 MG tablet Comments:   Reason for Stopping:         ondansetron (ZOFRAN) 4 MG tablet Comments:   Reason for Stopping:         ondansetron (ZOFRAN) 4 MG tablet Comments:   Reason for Stopping:         polyethylene glycol " "(GOLYTELY) 236 g suspension Comments:   Reason for Stopping:         polyethylene glycol (MIRALAX) 17 g packet Comments:   Reason for Stopping:         polyethylene glycol (MIRALAX) 17 g packet Comments:   Reason for Stopping:                     Brief History of Illness:   20 year old female with attenuated FAP.  Now s/p Laparoscopic Total abdominal colectomy with ileorectal anastomosis, partial omentectomy, take down of splenic flexure and flexible sigmoidoscopy in 4/22/2021.             Hospital Course:   Post-operatively pt was gently fluid resuscitated.  Snider was removed and pt was able to void.  Pt had eventual return of bowel function and was able to tolerate small amounts of a low fiber diet. Pt was able to ambulate steadily prior to discharge.  Post-operative pain was controlled with scheduled tylenol, ibuprofen and prn oxycodone.     Patient is to follow up in the Colon and Rectal Surgery Clinic in 2-3 week with Traci Lemons NP and then with Dr. Dunn in 4-5 weeks after.          Day of Discharge Physical Exam:   Blood pressure 107/53, pulse 83, temperature 98.4  F (36.9  C), temperature source Oral, resp. rate 19, height 1.6 m (5' 3\"), weight 60.1 kg (132 lb 7.9 oz), last menstrual period 04/01/2021, SpO2 100 %, not currently breastfeeding.    Please refer to Progress Note written by Dr. Don Saavedra on 4/24/2021 for physical exam on the day of discharge.          Final Pathology Result:   Patient Name: ALETA BAEZA   MR#: [id number]   Specimen #: [id number]   Collected: 4/22/2021   Received: 4/22/2021   Reported: 4/28/2021 14:57   Ordering Phy(s): RAFAELA LAURIE GAERTNER     For improved result formatting, select 'View Enhanced Report Format' under    Linked Documents section.     SPECIMEN(S):   A: Total abdominal colectomy, partial omentectomy   B: Anastomosis rings     FINAL DIAGNOSIS:   A. TOTAL ABDOMINAL COLECTOMY, PARTIAL OMENTECTOMY:   - Colonic wall with intramucosal and " sub-mucosal lymphoid aggregates and   superficial hyperplastic changes   - No evidence of neoplastic polyp   - Appendix with fibrous obliteration of lumen   - Seven benign lymph nodes (0/7)     B. ANASTOMOSIS RINGS:   - Colonic mucosa with two tubular adenomas, negative for high-grade   dysplasia   - Ileal wall with no significant histopathologic abnormality     I have personally reviewed all specimens and/or slides, including the   listed special stains, and used them   with my medical judgement to determine or confirm the final diagnosis.     Electronically signed out by:     Felipe Saldaña M.D., [facility]            Discharge Instructions and Follow-Up:     Discharge Procedure Orders   Reason for your hospital stay   Order Comments: 4/22/2021:  PROCEDURE PERFORMED:     1.  Laparoscopic total abdominal colectomy with ileorectal anastomosis.  2.  Partial omentectomy.  3.  Takedown of splenic flexure.  4.  Flexible sigmoidoscopy.     Adult [facility] Follow-up and recommended labs and tests   Order Comments: WOUND CARE  -Inspect your wounds daily for signs of infection (increased redness, drainage, pain)  -Keep your wound clean and dry  -You may shower, but do not soak in tub or pool    NOTIFY  Please contact Martha Toledo RN or Aurelia Olson RN or Susie PATIÑO at [phone] for problems after discharge such as:  -Temperature > 101F, chills, rigors, dizziness  -Redness around or purulent drainage from wound  -Inability to tolerate diet, nausea or vomiting  -You stop passing gas, develop significant bloating, abdominal pain  -Have blood in stools/vomit  -Have severe diarrhea/constipation  -Any other questions or concerns.  - At nights (after 4:30pm), on weekends, or if urgent, call [phone] and ask the  to speak with the on-call Colorectal Surgery resident or fellow      Medication Instructions  Some of your medications may have changed. Please take only prescribed and resumed medications      FOLLOW-UP  1.  You will need to follow-up with Traci Lemons NP in the Colon and Rectal Surgery clinic in 2-3 week(s) and then with CRS Staff: Dr. Wade Maguire in 3-4 weeks after.  Please contact our clinic scheduler (phone # [phone]) if you have not heard from our clinic in 3 business days afer discharge to schedule a follow-up appointment.         Appointments on [place] and/or [facility] (with [facility] or [facility] provider or service). Call [phone] if you haven't heard regarding these appointments within 7 days of discharge.     Activity   Order Comments: Your activity upon discharge:   ACTIVITY  -No lifting, pushing, pulling greater than 10 lbs and no strenuous exercise for 6 weeks   -Do not insert anything into your anus or rectum for 6 weeks  -No driving while on narcotic analgesics (i.e. Percocet, oxycodone, Vicodin)  -No driving until you are able to fully twist to both sides or slam on brakes quickly and without any pain  -We encourage walking at least 4-5 times per day     Order Specific Question Answer Comments   Is discharge order? Yes      Diet   Order Comments: Follow this diet upon discharge:   DIET  -Low Residue Diet for at least 4-6 weeks unless cleared by Colorectal surgery.  No raw vegetables, fruit skins, fibrous foods that require a lot of chewing, nuts, seeds, corn, popcorn.   -We recommend eating slowly, chewing thoroughly, eating small frequent meals throughout the day  -Stay well hydrated.     Order Specific Question Answer Comments   Is discharge order? Yes             Home Health Care:     Not needed           Discharge Disposition:     Discharged to home      Condition at discharge: Stable

## 2021-04-24 ENCOUNTER — PATIENT OUTREACH (OUTPATIENT)
Dept: CARE COORDINATION | Facility: CLINIC | Age: 21
End: 2021-04-24

## 2021-04-24 ENCOUNTER — APPOINTMENT (OUTPATIENT)
Dept: OCCUPATIONAL THERAPY | Facility: CLINIC | Age: 21
End: 2021-04-24
Attending: COLON & RECTAL SURGERY
Payer: COMMERCIAL

## 2021-04-24 VITALS
HEART RATE: 99 BPM | DIASTOLIC BLOOD PRESSURE: 54 MMHG | TEMPERATURE: 98.2 F | SYSTOLIC BLOOD PRESSURE: 118 MMHG | HEIGHT: 63 IN | WEIGHT: 132.5 LBS | OXYGEN SATURATION: 95 % | BODY MASS INDEX: 23.48 KG/M2 | RESPIRATION RATE: 16 BRPM

## 2021-04-24 PROCEDURE — 250N000013 HC RX MED GY IP 250 OP 250 PS 637: Performed by: COLON & RECTAL SURGERY

## 2021-04-24 PROCEDURE — 97530 THERAPEUTIC ACTIVITIES: CPT | Mod: GO | Performed by: OCCUPATIONAL THERAPIST

## 2021-04-24 PROCEDURE — 250N000011 HC RX IP 250 OP 636: Performed by: COLON & RECTAL SURGERY

## 2021-04-24 PROCEDURE — 97535 SELF CARE MNGMENT TRAINING: CPT | Mod: GO | Performed by: OCCUPATIONAL THERAPIST

## 2021-04-24 PROCEDURE — 250N000011 HC RX IP 250 OP 636: Performed by: STUDENT IN AN ORGANIZED HEALTH CARE EDUCATION/TRAINING PROGRAM

## 2021-04-24 RX ORDER — OXYCODONE HYDROCHLORIDE 5 MG/1
5 TABLET ORAL EVERY 6 HOURS PRN
Qty: 18 TABLET | Refills: 0 | Status: SHIPPED | OUTPATIENT
Start: 2021-04-24 | End: 2021-05-24

## 2021-04-24 RX ORDER — IBUPROFEN 800 MG/1
800 TABLET, FILM COATED ORAL 3 TIMES DAILY
Status: DISCONTINUED | OUTPATIENT
Start: 2021-04-24 | End: 2021-04-24 | Stop reason: HOSPADM

## 2021-04-24 RX ORDER — ONDANSETRON 4 MG/1
4 TABLET, ORALLY DISINTEGRATING ORAL EVERY 6 HOURS PRN
Status: DISCONTINUED | OUTPATIENT
Start: 2021-04-24 | End: 2021-04-24 | Stop reason: HOSPADM

## 2021-04-24 RX ORDER — LOPERAMIDE HCL 2 MG
2 CAPSULE ORAL 2 TIMES DAILY
Status: DISCONTINUED | OUTPATIENT
Start: 2021-04-24 | End: 2021-04-24 | Stop reason: HOSPADM

## 2021-04-24 RX ORDER — LOPERAMIDE HCL 2 MG
2 CAPSULE ORAL 2 TIMES DAILY
Qty: 50 CAPSULE | Refills: 3 | Status: SHIPPED | OUTPATIENT
Start: 2021-04-24 | End: 2022-04-18

## 2021-04-24 RX ADMIN — HYDROMORPHONE HYDROCHLORIDE 0.4 MG: 1 INJECTION, SOLUTION INTRAMUSCULAR; INTRAVENOUS; SUBCUTANEOUS at 04:21

## 2021-04-24 RX ADMIN — OXYCODONE HYDROCHLORIDE 5 MG: 5 TABLET ORAL at 07:48

## 2021-04-24 RX ADMIN — LOPERAMIDE HYDROCHLORIDE 2 MG: 2 CAPSULE ORAL at 10:50

## 2021-04-24 RX ADMIN — OXYCODONE HYDROCHLORIDE 5 MG: 5 TABLET ORAL at 01:07

## 2021-04-24 RX ADMIN — ACETAMINOPHEN 1000 MG: 500 TABLET, FILM COATED ORAL at 10:50

## 2021-04-24 RX ADMIN — PROCHLORPERAZINE EDISYLATE 10 MG: 5 INJECTION INTRAMUSCULAR; INTRAVENOUS at 04:21

## 2021-04-24 RX ADMIN — ONDANSETRON 4 MG: 4 TABLET, ORALLY DISINTEGRATING ORAL at 08:50

## 2021-04-24 RX ADMIN — ACETAMINOPHEN 1000 MG: 500 TABLET, FILM COATED ORAL at 07:48

## 2021-04-24 ASSESSMENT — ACTIVITIES OF DAILY LIVING (ADL)
ADLS_ACUITY_SCORE: 16

## 2021-04-24 NOTE — PROGRESS NOTES
Colorectal Surgery Progress Note  04/24/2021     S:  No acute events overnight, with reports of increased cramping abdominal pain and nausea early this morning managed with PO oxycodone and IV dilaudid. She has had some tachycardia in the low 100s and asymptomatic hypotension. Is passing gas and having frequent loose stools. Tolerating low fiber diet. Urine output adequate     O:  Temp:  [97.6  F (36.4  C)-98.6  F (37  C)] 98.4  F (36.9  C)  Pulse:  [] 107  Resp:  [16] 16  BP: ()/(35-51) 111/48  SpO2:  [95 %-100 %] 95 %  I/O last 3 completed shifts:  In: 1000 [P.O.:600; I.V.:400]  Out: 550 [Urine:550]     Intake/Output Summary (Last 24 hours) at 4/24/2021 0712  Last data filed at 4/24/2021 0215  Gross per 24 hour   Intake 1000 ml   Output 550 ml   Net 450 ml     Physical Exam   Gen: awake, alert, no acute distress, resting comfortably in bed  Pulm: non labored breathing on room air  Abd: soft, non-distended, mildly tender, no guarding/rebounding. Incisions are clean, dry and intact  Ext: warm and well perfused    Labs:  Hemoglobin   Date Value Ref Range Status   04/23/2021 12.1 11.7 - 15.7 g/dL Final     Potassium   Date Value Ref Range Status   04/22/2021 3.9 3.4 - 5.3 mmol/L Final     Magnesium   Date Value Ref Range Status   04/22/2021 1.9 1.6 - 2.3 mg/dL Final     Phosphorus   Date Value Ref Range Status   04/22/2021 2.4 (L) 2.5 - 4.5 mg/dL Final     A/P: Wally Varma is a 20 year old female with history of attenuated FAP now POD#2 for laparoscopic total abdominal colectomy with ileorectal anastomosis and flexible sigmoidoscopy, recovering post-operatively.    Neuro/Pain: continue tylenol, oxy, IV toradol, switched to s ibuprofen TID this AM  CV: continue to closely monitor HR, has had mild tachycardia in low 100s  GI/FEN: low fiber diet, will add imodium 2g BID for frequent loose stools  : voiding, adequate output  Heme:  Hgb 12.1 4/23  Activity: as tolerated.  Ppx: lovenox ppx  Dispo:  pending pain control. Possibly tomorrow vs Monday. Does not need lovenox on discharge.    Seen and discussed with resident Dr. Saavedra, Fellow Dr. Lowe and staff Dr. Ting Prakash, MS3  [facility]  04/24/2021 7:04 AM    -----------------------------  Edited, revised, addendum by:     Don Saavedra MD  Surgery, PGY-1  [facility]

## 2021-04-24 NOTE — PLAN OF CARE
Assumed care from [id number]. Slightly tachycardic in low 100s, hypotensive at times, asymptomatic, OVSS on RA. Up with SBA to bathroom. Pain managed with scheduled ibuprofen, Prn oxycodone and IV dilaudid. Abd lap sites liquid bandaged and J CARLOS, abd binder in place. Reports passing flatus, LBM 4/23. Voids spontaneously with adequate UOP. Tolerating LFD, one episode of nausea relieved with PRN compazine. PIVs saline locked. Continue with POC.

## 2021-04-24 NOTE — PLAN OF CARE
Alert and oriented. VSS. BP slightly hypotensive. HR 90's. Felt generally uncomfortable and nauseous this morning, improved after Oxycodone and Zofran given. Up ad john. Tolerating low fiber diet without nausea, decreased appetite. Passing gas and having loose bowel movements. BMs slowing down after Imodium. Voiding spontaneously.     Patient eager to discharge and reported readiness. Surgical cross cover notified and discharge order placed.     Patient and her mom verbalized understanding of discharge instructions, follow up appointments and medication regimen. Discharged to home with assist of family as needed. All belongings sent.

## 2021-04-26 ENCOUNTER — PATIENT OUTREACH (OUTPATIENT)
Dept: SURGERY | Facility: CLINIC | Age: 21
End: 2021-04-26

## 2021-04-26 NOTE — PROGRESS NOTES
Post Op Note     Called to check on patient postoperatively after hospital discharge.     Patient is s/p Lap TAC with ileorectal anastomosis with Dr. Wade Maguire for FAP  Admitted 4/22/2021 and discharged on 4/24/2021.      Pain is well controlled with oxycodone and tylenol     Patient is eating and drinking normally. Patient is on a low fiber diet.  Encouraged patient to drink 8-10 glasses of water a day.     Patient is passing flats, is having loose bowel movements.--She is taking imodium twice a day which has helped. She had had one BM today and three yesterday    Patient is voiding normally and urine is light in color.    Patient is not set up with home care.     Patient does not require supplies.    Patient Denies nausea and vomiting.    Patient Denies any fevers or chills.    Patient's incision is C/D/I. Patient reminded NOT to remove any dressings over their incisions.     Patient is on a activity restriction. Lifting 10 pounds for 6 weeks.     Patient Denies needing any forms completed.     Follow up is set up with Traci Zurita NP on 5/6/2021 and with Dr. Wade Maguire on 6/7/2021.   Encouraged the patient to contact the clinic in the meantime with any fevers, chills, nausea, vomiting, increased colostomy output, no colostomy output, dizziness, lightheadedness, uncontrolled pain, changes to the incisions, or with any questions or concerns.    Patient's questions were answered to their stated satisfaction and they are in agreement with this plan.    KIMBERLY Thomas [phone]  Colon & Rectal Surgery Clinic  [facility]

## 2021-04-27 NOTE — PROGRESS NOTES
The patient was contacted by another RN for post-hospital follow up. Will close encounter at this time.    Kristal Gotti CMA, [facility]  Post Hospital Discharge Team

## 2021-04-28 LAB — COPATH REPORT: NORMAL

## 2021-05-06 ENCOUNTER — VIRTUAL VISIT (OUTPATIENT)
Dept: SURGERY | Facility: CLINIC | Age: 21
End: 2021-05-06
Payer: COMMERCIAL

## 2021-05-06 VITALS — WEIGHT: 125 LBS | BODY MASS INDEX: 22.15 KG/M2 | HEIGHT: 63 IN

## 2021-05-06 DIAGNOSIS — D13.91 FAP (FAMILIAL ADENOMATOUS POLYPOSIS): ICD-10-CM

## 2021-05-06 DIAGNOSIS — Z09 FOLLOW-UP EXAMINATION AFTER COLORECTAL SURGERY: Primary | ICD-10-CM

## 2021-05-06 PROCEDURE — 99024 POSTOP FOLLOW-UP VISIT: CPT | Performed by: NURSE PRACTITIONER

## 2021-05-06 ASSESSMENT — MIFFLIN-ST. JEOR: SCORE: 1296.13

## 2021-05-06 ASSESSMENT — PAIN SCALES - GENERAL: PAINLEVEL: NO PAIN (0)

## 2021-05-06 NOTE — NURSING NOTE
"Chief Complaint   Patient presents with     Video Visit     Post-op 4/22/2021 lap. total colectomy with ileorectal anastomosis       Vitals:    05/06/21 0647   Weight: 125 lb   Height: 5' 3\"       Body mass index is 22.14 kg/m .    Lyssa Caro MA    "

## 2021-05-06 NOTE — PROGRESS NOTES
"Colon and Rectal Surgery Consult Video Note    Date: 2021     Referring provider:  Wade Maguire MD  [address] [address]     RE: Wally Varma  : 2000  DIMAS: 2021    Wally Varma is a 20 year old female who is being evaluated via a billable video visit.      The patient has been notified of following:     \"This video visit will be conducted via a call between you and your physician/provider. We have found that certain health care needs can be provided without the need for an in-person physical exam.  This service lets us provide the care you need with a video conversation.  If a prescription is necessary we can send it directly to your pharmacy.  If lab work is needed we can place an order for that and you can then stop by our lab to have the test done at a later time.    Video visits are billed at different rates depending on your insurance coverage.  Please reach out to your insurance provider with any questions.    If during the course of the call the physician/provider feels a video visit is not appropriate, you will not be charged for this service.\"    Patient has given verbal consent for Video visit? Yes    Video Start Time: 0708     Wally Varma is a very pleasant 20 year old female with attenuated familial adenomatous polyposis who is now status post laparoscopic total abdominal colectomy with ileorectal anastomosis, partial omentectomy, takedown of splenic flexure, and flexible sigmoidoscopy on 2021 with Dr. Maguire.    FINAL DIAGNOSIS:   A. TOTAL ABDOMINAL COLECTOMY, PARTIAL OMENTECTOMY:   - Colonic wall with intramucosal and sub-mucosal lymphoid aggregates and   superficial hyperplastic changes   - No evidence of neoplastic polyp   - Appendix with fibrous obliteration of lumen   - Seven benign lymph nodes (0/7)     B. ANASTOMOSIS RINGS:   - Colonic mucosa with two tubular adenomas, negative for high-grade   dysplasia   - Ileal wall " with no significant histopathologic abnormality     Interval History: Justa is been doing well.  She is only needing Tylenol and ibuprofen for pain.  She is eating and drinking without difficulty and tolerating a low residue diet.  No fevers or chills.  Bowel movements are starting to get formed but she does have some pain with bowel movements.  No sharp pain.  No bleeding.  She is taking 1 Imodium a day and having only 2-3 bowel movements a day.  No nausea or vomiting.    Assessment/Plan: 20 year old female status post laparoscopic total abdominal colectomy with ileorectal anastomosis, partial omentectomy, takedown of splenic flexure, and flexible sigmoidoscopy on 4/22/2021 with Dr. Maguire.  She is recovering well.  Tolerating a low residue diet and I would like her to stay on this another 3 to 4 weeks and then she can slowly add fiber back into her diet.  No lifting more than 10 pounds for full 6 weeks from surgery.  Stools are starting to form with once daily Imodium.  She may be able to back off on this when she starts adding more fiber back into her diet.  Notify the clinic if she has any increased pain with bowel movements, any sharp pain, or any bleeding.  She is meeting with Dr. Maguire in 1 month.  Discussed the need for continued surveillance of her rectum annually and she plans to have this done with Dr. Maguire.  Encouraged her to contact the clinic in the meantime with any questions or concerns. Patient's questions were answered to her stated satisfaction and she is in agreement with this plan.    Video-Visit Details    Type of service:  Video Visit    Video End Time (time video stopped): 0713    Originating Location (pt. Location): Home    Distant Location (provider location):  [facility]ON AND RECTAL SURGERY CL[facility] [place]     Mode of Communication:  Video Conference via [employer]    8 minutes spent on the date of the encounter doing chart review, history, review of imaging,  documentation ,and further activities as noted above, which includes my time spent on video with the patient/family.       Medical history:  Past Medical History:   Diagnosis Date     Familial polyposis        Surgical history:  Past Surgical History:   Procedure Laterality Date     COLONOSCOPY       COLONOSCOPY N/A 08/17/2020    Procedure: Colonoscopy, With Polypectomy And Biopsy;  Surgeon: Nathan Hernandez MD;  Location: [facility] OR     COLONOSCOPY WITH CO2 INSUFFLATION N/A 08/17/2020    Procedure: COLONOSCOPY, WITH CO2 INSUFFLATION;  Surgeon: Nathan Hernandez MD;  Location: [facility]     COMBINED ESOPHAGOSCOPY, GASTROSCOPY, DUODENOSCOPY (EGD) WITH CO2 INSUFFLATION N/A 08/17/2020    Procedure: ESOPHAGOGASTRODUODENOSCOPY, WITH CO2 INSUFFLATION;  Surgeon: Nathan Hernandez MD;  Location: [facility]     ENDOSCOPY       ESOPHAGOSCOPY, GASTROSCOPY, DUODENOSCOPY (EGD), COMBINED N/A 08/17/2020    Procedure: Esophagogastroduodenoscopy, With Biopsy;  Surgeon: Nathan Hernandez MD;  Location: [facility]     LAPAROSCOPIC ASSISTED COLECTOMY N/A 4/22/2021    Procedure: Laparoscopic assisted total abdominal colectomy with ileorectal anastomosis, takedown of splenic flexure;  Surgeon: Wade Maguire MD;  [facility]     OMENTECTOMY N/A 4/22/2021    Procedure: PARTIAL OMENTECTOMY;  Surgeon: Wade Maguire MD;  [facility]     SIGMOIDOSCOPY FLEXIBLE N/A 4/22/2021    Procedure: SIGMOIDOSCOPY, FLEXIBLE;  Surgeon: Wade Maguire MD;  [facility]     TONSILLECTOMY         Problem list:  Patient Active Problem List    Diagnosis Date Noted     FAP (familial adenomatous polyposis) 02/09/2021     Priority: Medium     Added automatically from request for surgery [id number]       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     Priority: Medium       Medications:  Current Outpatient Medications   Medication Sig Dispense Refill     acetaminophen (TYLENOL) 500 MG tablet Take 2 tablets (1,000 mg) by mouth 4  "times daily 70 tablet 0     fexofenadine (ALLEGRA) 60 MG tablet Take 60 mg by mouth 2 times daily as needed        ibuprofen (ADVIL/MOTRIN) 800 MG tablet Take 1 tablet (800 mg) by mouth every 8 hours 30 tablet 0     loperamide (IMODIUM) 2 MG capsule Take 1 capsule (2 mg) by mouth 2 times daily 50 capsule 3     oxyCODONE (ROXICODONE) 5 MG tablet Take 1 tablet (5 mg) by mouth every 6 hours as needed for moderate to severe pain 18 tablet 0       Allergies:  No Known Allergies    Family history:  Family History   Problem Relation Age of Onset     Hypertension Mother      Hypertension Maternal Grandfather      Deep Vein Thrombosis (DVT) Maternal Grandfather        Social history:  Social History     Tobacco Use     Smoking status: Never Smoker     Smokeless tobacco: Never Used   Substance Use Topics     Alcohol use: Yes     Comment: rare    Marital status: single.  Occupation: .    Nursing Notes:   Lyssa Caro  5/6/2021  6:50 AM  Signed  Chief Complaint   Patient presents with     Video Visit     Post-op 4/22/2021 lap. total colectomy with ileorectal anastomosis       Vitals:    05/06/21 0647   Weight: 125 lb   Height: 5' 3\"       Body mass index is 22.14 kg/m .    NANY Hernandez APRN, NP-C  Colon and Rectal Surgery   [facility]    This note was created using speech recognition software and may contain unintended word substitutions.    "

## 2021-05-06 NOTE — LETTER
"2021       RE: Wally Varma  [address] [facility] [address]     Dear Colleague,    Thank you for referring your patient, Wally Varma, to the [facility] COLON [facility] RECTAL SURGERY CLINIC [place] at [facility]. Please see a copy of my visit note below.    Colon and Rectal Surgery Consult Video Note    Date: 2021     Referring provider:  Wade Maguire MD  [address] [place] [address] [address]  [place] [address]     RE: Wally Varma  : 2000  DIMAS: 2021    Wally Varma is a 20 year old female who is being evaluated via a billable video visit.      The patient has been notified of following:     \"This video visit will be conducted via a call between you and your physician/provider. We have found that certain health care needs can be provided without the need for an in-person physical exam.  This service lets us provide the care you need with a video conversation.  If a prescription is necessary we can send it directly to your pharmacy.  If lab work is needed we can place an order for that and you can then stop by our lab to have the test done at a later time.    Video visits are billed at different rates depending on your insurance coverage.  Please reach out to your insurance provider with any questions.    If during the course of the call the physician/provider feels a video visit is not appropriate, you will not be charged for this service.\"    Patient has given verbal consent for Video visit? Yes    Video Start Time: 708     Wally Varma is a very pleasant 20 year old female with attenuated familial adenomatous polyposis who is now status post laparoscopic total abdominal colectomy with ileorectal anastomosis, partial omentectomy, takedown of splenic flexure, and flexible sigmoidoscopy on 2021 with Dr. Maguire.    FINAL DIAGNOSIS:   A. TOTAL ABDOMINAL COLECTOMY, PARTIAL OMENTECTOMY:   - Colonic " wall with intramucosal and sub-mucosal lymphoid aggregates and   superficial hyperplastic changes   - No evidence of neoplastic polyp   - Appendix with fibrous obliteration of lumen   - Seven benign lymph nodes (0/7)     B. ANASTOMOSIS RINGS:   - Colonic mucosa with two tubular adenomas, negative for high-grade   dysplasia   - Ileal wall with no significant histopathologic abnormality     Interval History: Justa is been doing well.  She is only needing Tylenol and ibuprofen for pain.  She is eating and drinking without difficulty and tolerating a low residue diet.  No fevers or chills.  Bowel movements are starting to get formed but she does have some pain with bowel movements.  No sharp pain.  No bleeding.  She is taking 1 Imodium a day and having only 2-3 bowel movements a day.  No nausea or vomiting.    Assessment/Plan: 20 year old female status post laparoscopic total abdominal colectomy with ileorectal anastomosis, partial omentectomy, takedown of splenic flexure, and flexible sigmoidoscopy on 4/22/2021 with Dr. Maguire.  She is recovering well.  Tolerating a low residue diet and I would like her to stay on this another 3 to 4 weeks and then she can slowly add fiber back into her diet.  No lifting more than 10 pounds for full 6 weeks from surgery.  Stools are starting to form with once daily Imodium.  She may be able to back off on this when she starts adding more fiber back into her diet.  Notify the clinic if she has any increased pain with bowel movements, any sharp pain, or any bleeding.  She is meeting with Dr. Maguire in 1 month.  Discussed the need for continued surveillance of her rectum annually and she plans to have this done with Dr. Maguire.  Encouraged her to contact the clinic in the meantime with any questions or concerns. Patient's questions were answered to her stated satisfaction and she is in agreement with this plan.    Video-Visit Details    Type of service:  Video Visit    Video End  Time (time video stopped): 0713    Originating Location (pt. Location): Home    Distant Location (provider location):  [facility] RECTAL SUR[facility]ERY [facility] [place]     Mode of Communication:  Video Conference via [employer]    8 minutes spent on the date of the encounter doing chart review, history, review of imaging, documentation ,and further activities as noted above, which includes my time spent on video with the patient/family.       Medical history:  Past Medical History:   Diagnosis Date     Familial polyposis        Surgical history:  Past Surgical History:   Procedure Laterality Date     COLONOSCOPY       COLONOSCOPY N/A 08/17/2020    Procedure: Colonoscopy, With Polypectomy And Biopsy;  Surgeon: Nathan Hernandez MD;  [facility]     COLONOSCOPY WITH CO2 INSUFFLATION N/A 08/17/2020    Procedure: COLONOSCOPY, WITH CO2 INSUFFLATION;  Surgeon: Nathan Hernandez MD;  [facility]     COMBINED ESOPHAGOSCOPY, GASTROSCOPY, DUODENOSCOPY (EGD) WITH CO2 INSUFFLATION N/A 08/17/2020    Procedure: ESOPHAGOGASTRODUODENOSCOPY, WITH CO2 INSUFFLATION;  Surgeon: Nathan Hernandez MD;  [facility]     ENDOSCOPY       ESOPHAGOSCOPY, GASTROSCOPY, DUODENOSCOPY (EGD), COMBINED N/A 08/17/2020    Procedure: Esophagogastroduodenoscopy, With Biopsy;  Surgeon: Nathan Hernandez MD;  [facility]     LAPAROSCOPIC ASSISTED COLECTOMY N/A 4/22/2021    Procedure: Laparoscopic assisted total abdominal colectomy with ileorectal anastomosis, takedown of splenic flexure;  Surgeon: Wade Maguire MD;  [facility]     OMENTECTOMY N/A 4/22/2021    Procedure: PARTIAL OMENTECTOMY;  Surgeon: Wade Maguire MD;  [facility]     SIGMOIDOSCOPY FLEXIBLE N/A 4/22/2021    Procedure: SIGMOIDOSCOPY, FLEXIBLE;  Surgeon: Wade Maguire MD;  [facility]     TONSILLECTOMY         Problem list:  Patient Active Problem List    Diagnosis Date Noted     FAP (familial  "adenomatous polyposis) 02/09/2021     Priority: Medium     Added automatically from request for surgery [id number]       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     Priority: Medium       Medications:  Current Outpatient Medications   Medication Sig Dispense Refill     acetaminophen (TYLENOL) 500 MG tablet Take 2 tablets (1,000 mg) by mouth 4 times daily 70 tablet 0     fexofenadine (ALLEGRA) 60 MG tablet Take 60 mg by mouth 2 times daily as needed        ibuprofen (ADVIL/MOTRIN) 800 MG tablet Take 1 tablet (800 mg) by mouth every 8 hours 30 tablet 0     loperamide (IMODIUM) 2 MG capsule Take 1 capsule (2 mg) by mouth 2 times daily 50 capsule 3     oxyCODONE (ROXICODONE) 5 MG tablet Take 1 tablet (5 mg) by mouth every 6 hours as needed for moderate to severe pain 18 tablet 0       Allergies:  No Known Allergies    Family history:  Family History   Problem Relation Age of Onset     Hypertension Mother      Hypertension Maternal Grandfather      Deep Vein Thrombosis (DVT) Maternal Grandfather        Social history:  Social History     Tobacco Use     Smoking status: Never Smoker     Smokeless tobacco: Never Used   Substance Use Topics     Alcohol use: Yes     Comment: rare    Marital status: single.  Occupation: .    Nursing Notes:   Lyssa Caro  5/6/2021  6:50 AM  Signed  Chief Complaint   Patient presents with     Video Visit     Post-op 4/22/2021 lap. total colectomy with ileorectal anastomosis       Vitals:    05/06/21 0647   Weight: 125 lb   Height: 5' 3\"       Body mass index is 22.14 kg/m .    NANY Hernandez APRN, NP-C  Colon and Rectal Surgery   [facility]    This note was created using speech recognition software and may contain unintended word substitutions.        "

## 2021-05-12 ENCOUNTER — VIRTUAL VISIT (OUTPATIENT)
Dept: GASTROENTEROLOGY | Facility: CLINIC | Age: 21
End: 2021-05-12
Payer: COMMERCIAL

## 2021-05-12 DIAGNOSIS — D13.91 FAP (FAMILIAL ADENOMATOUS POLYPOSIS): Primary | ICD-10-CM

## 2021-05-12 DIAGNOSIS — Z71.3 NUTRITIONAL COUNSELING: ICD-10-CM

## 2021-05-12 PROCEDURE — 97803 MED NUTRITION INDIV SUBSEQ: CPT | Mod: 95 | Performed by: DIETITIAN, REGISTERED

## 2021-05-12 NOTE — PATIENT INSTRUCTIONS
It was nice speaking with you today. Below are the nutrition recommendations we discussed at your visit.    Please let me know if you have any additional questions.    Nutrition Recommendations      1. Continue following a low fiber diet for the next 3-4 weeks per LUIS Frey CNP recommendations.    For low fiber diet: (see the list of foods in the handout I sent for addition ideas).  -Avoid raw vegetables. Remove skins and peels from vegetables and cook vegetables very well/until more mushy before eating. Avoid any stringy vegetables.  -OK to eat canned peaches, canned pear, as well as applesauce, canned mandarin oranges, ripe bananas, ripe cantaloupe or ripe honeydew melon.  -Can eat cooked potatoes (no skin), cooked sweet potatoes (no skin), and cooked winter squash (such as butternut squash or acorn squash), no skins.  -Avoid whole grain breads/crackers. Ok to Choose white bread, low fiber crackers such as saltines, white rice, regular pasta is fine as well not whole wheat).  -Okay to eat rice crispies, rice chex, special K, corn flakes cereal for example or cream of rice or cream of wheat cereal okay too.   -Okay to eat tender/moist meat, chicken, turkey, fish, tofu, ground meats (beef, turkey, chicken). Avoid dry grissly meats.   -Okay to have deli meats such as ham, turkey, chicken.  -Do not eat nuts or seeds.   -Can have smooth peanut butter or smooth almond butter. (not chunky).  -Ok to have a small amount of avocado or guacamole (such as about 2 tablespoons).    2. Eat smaller, more frequent meals and snacks. Could aim for eating 6 smaller meals per day or could have a small meal or snack every 2-3 hours.    3. Can make your homemade smoothie for a small meal/snack (fairlife milk with banana and canned peaches/canned pears or can add some peanut butter in fairlife milk/chocolate fairlife milk or can use a strawberry protein powder and blend in banana and fairlife milk for some  examples).    4. Chew food very well before swallowing.    5. If having some loose/watery stools, some foods that may help thicken stools include unsweetened applesauce, banana, pasta, pretzels, animal crackers, white bread, potatoes (no skin).    6. Drink adequate fluids. Aim for drinking at least 8 cups of fluids (water, 100% juice, gatorade) per day.    7. If you decide to start re-introducing fiber back into your diet in the next 3-4 weeks and before our next visit in June, then gradually re-introduce fiber by adding in 1-2 grams per day, or could start with adding a small serving of a food containing fiber in your diet every couple of days.     Your appointment is scheduled for June 8, 2021 at 9:30 AM. If you need to make any changes to your appointment, please call [phone].    Shirley Grace, MS, RD, LD

## 2021-05-12 NOTE — PROGRESS NOTES
"Wally Varma 20 year old female who is being evaluated via a billable video visit.      The patient has been notified of following:     \"This video visit will be conducted via a call between you and your physician/provider. We have found that certain health care needs can be provided without the need for an in-person physical exam.  This service lets us provide the care you need with a video conversation.  If a prescription is necessary we can send it directly to your pharmacy.  If lab work is needed we can place an order for that and you can then stop by our lab to have the test done at a later time.    Video visits are billed at different rates depending on your insurance coverage.  Please reach out to your insurance provider with any questions.    If during the course of the call the physician/provider feels a video visit is not appropriate, you will not be charged for this service.\"    Patient has given verbal consent for Video visit? Yes  During this virtual visit the patient is located in [place], patient verifies this as the location during the entirety of this visit.     How would you like to obtain your AVS? MyChart  If you are dropped from the video visit, the video invite should be resent to: Other e-mail: my chart  Will anyone else be joining your video visit? No      Video-Visit Details    Type of service:  Video Visit    Video Start Time: 9:30 AM   Video End Time: 10:09 AM    Originating Location (pt. Location): Home    Distant Location (provider location):  [facility] [place]     Platform used for Video Visit: [employer]    During this virtual visit the patient is located in [place], patient verifies this as the location during the entirety of this visit.       [facility] Outpatient Medical Nutrition Therapy      Time Spent:  39 minutes  Session Type:  Follow-up visit  Referring Physician:  Wade Maguire MD  Reason for RD Visit:  Nutritional counseling, s/p ileorectal " "anastomosis.    Nutrition Assessment:  Patient is a 20 year old female with history that includes familiar adenomatous polyp syndrome. Pt now s/p ileorectal anastomosis, partial omentectomy, takedown of splenic fixture and flex sigmoidectomy on 4/22/21 with Dr. Maguire. She stated that she did not end up getting an ileostomy. She has been following a low fiber diet and had visit with LUIS CHERRY last week and plan is to continue low fiber diet for 3-4 more weeks. She stated that she will follow a low Fiber diet until she has her f/up visit with Dr. Maguire on June 7th to see what he recommends at that time. She wants to be very careful and continue a low fiber diet and then carefully re-introduce when able. She stated that she has been following a low fiber diet but can be difficult at times because she may be craving something but it's not low in fiber and can't eat it. Her appetite is increasing too. She has been afraid to eat chicken for example because she wasn't sure if this would be tolerated or cause issues for her. She has been eating softer lower fiber foods, only has some mashed potatoes otherwise has not been eating any other cooked vegetables. She is eating a few smaller meals per day and some snacks. She tries to eat dinner a little earlier too just so she doesn't have any issues overnight. She is drinking about 4 cups water and 2 boxes of 100% juice. She is having about 2 bowel movements per day and they are starting to be more formed. She is taking imodium once per day. She is interested in diet education review for low fiber diet.    Height:   Ht Readings from Last 1 Encounters:   05/06/21 1.6 m (5' 3\")     Weight:  Down 6% over past month.  Wt Readings from Last 10 Encounters:   05/06/21 56.7 kg (125 lb)   04/22/21 60.1 kg (132 lb 7.9 oz)   04/12/21 61 kg (134 lb 6.4 oz)   04/12/21 60.6 kg (133 lb 8 oz)   02/08/21 54.4 kg (120 lb)   08/10/20 54.4 kg (120 lb)   07/20/20 54.9 kg (121 lb)   03/11/20 54 " "kg (119 lb)   12/09/19 51.3 kg (113 lb)     BMI:   Estimated body mass index is 22.14 kg/m  as calculated from the following:    Height as of 5/6/21: 1.6 m (5' 3\").    Weight as of 5/6/21: 56.7 kg (125 lb).    Diet Recall:  (some usual recent meals):.  Meal Food    Breakfast Bagel with egg and banana   Lunch Cheese pizza or bagel bites or Pasta with chelly sauce   Dinner Brat with mashed potatoes or cheese pizza   Snacks goldfish crackers or applesauce, banana, pudding, or pretzels   Beverages ~2 large cups water (likely 32 oz total) Water, 16 oz apple juice or fruit punch, sometimes gatorade          Labs:    Last Comprehensive Metabolic Panel:  Sodium   Date Value Ref Range Status   04/12/2021 137 133 - 144 mmol/L Final     Potassium   Date Value Ref Range Status   04/22/2021 3.9 3.4 - 5.3 mmol/L Final     Chloride   Date Value Ref Range Status   04/12/2021 106 94 - 109 mmol/L Final     Carbon Dioxide   Date Value Ref Range Status   04/12/2021 28 20 - 32 mmol/L Final     Anion Gap   Date Value Ref Range Status   04/12/2021 3 3 - 14 mmol/L Final     Glucose   Date Value Ref Range Status   04/12/2021 81 70 - 99 mg/dL Final     Urea Nitrogen   Date Value Ref Range Status   04/12/2021 11 7 - 30 mg/dL Final     Creatinine   Date Value Ref Range Status   04/12/2021 0.57 0.52 - 1.04 mg/dL Final     GFR Estimate   Date Value Ref Range Status   04/12/2021 >90 >60 mL/min/[1.73_m2] Final     Comment:     Non  GFR Calc  Starting 12/18/2018, serum creatinine based estimated GFR (eGFR) will be   calculated using the Chronic Kidney Disease Epidemiology Collaboration   (CKD-EPI) equation.       Calcium   Date Value Ref Range Status   04/12/2021 9.0 8.5 - 10.1 mg/dL Final       CBC RESULTS: No results for input(s): WBC, RBC, HGB, HCT, MCV, MCH, MCHC, RDW, PLT in the last 38120 hours.    Pertinent Medications/vitamin and mineral supplements:    Current Outpatient Medications   Medication     acetaminophen (TYLENOL) " 500 MG tablet     fexofenadine (ALLEGRA) 60 MG tablet     ibuprofen (ADVIL/MOTRIN) 800 MG tablet     loperamide (IMODIUM) 2 MG capsule     oxyCODONE (ROXICODONE) 5 MG tablet     No current facility-administered medications for this visit.      Food Allergies:  NKFA    MALNUTRITION:  % Weight Loss:  > 5% in 1 month (severe malnutrition)  % Intake:  Decreased intake does not meet criteria for malnutrition   Subcutaneous Fat Loss:  None observed  Muscle Loss:  None observed  Fluid Retention:  None noted    Malnutrition Diagnosis: Patient does not meet two of the above criteria necessary for diagnosing malnutrition  In Context of:  Acute illness or injury  Chronic illness or disease    Nutrition Diagnosis:    Previous: Food and nutrition related knowledge deficit related to lack of previous diet education for ileostomy as evidenced by pt report and interest in diet education.-Amended    Current: Food and nutrition related knowledge deficit related to lack of complete recall of previous diet education/interested in review of previous diet education as evidenced by pt report and interest in diet education review and with questions.    Nutrition Prescription: low fiber diet and adequate fluids.    Nutrition Intervention:    Nutrition Education/Counseling:  Provided diet education review for low fiber diet. Gave pt specific tips and suggestions for additional foods she can have based on her food preferences while still following a low fiber. Explained that she can have peeled, well cooked vegetables such as well-cooked carrots, sweet potatoes (no skin), or other well cooked ones. Also told pt she can eat meat such as chicken or turkey or fish and deli meats okay as well such as deli ham or turkey as well as cheese. Additionally since she like yoshi explained that okay for some ripe melon as well as this is okay on a low fiber diet. Encouraged her to drink adequate fluids to drink at least 8 cups (64 oz) per day of  water, gatorade, 100% juice. Answered patient's questions. Patient verbalized understanding of education provided. See Goals below.     Educational Materials Provided:  Nutrition care manual: Ileostomy Nutrition therapy    Goals:    1. Continue following a low fiber diet for the next 3-4 weeks per LUIS Frey CNP recommendations.    For low fiber diet: (see the list of foods in the handout I sent for addition ideas).  -Avoid raw vegetables. Remove skins and peels from vegetables and cook vegetables very well/until more mushy before eating. Avoid any stringy vegetables.  -OK to eat canned peaches, canned pear, as well as applesauce, canned mandarin oranges, ripe bananas, ripe cantaloupe or ripe honeydew melon.  -Can eat cooked potatoes (no skin), cooked sweet potatoes (no skin), and cooked winter squash (such as butternut squash or acorn squash), no skins.  -Avoid whole grain breads/crackers. Ok to Choose white bread, low fiber crackers such as saltines, white rice, regular pasta is fine as well not whole wheat).  -Okay to eat rice crispies, rice chex, special K, corn flakes cereal for example or cream of rice or cream of wheat cereal okay too.   -Okay to eat tender/moist meat, chicken, turkey, fish, tofu, ground meats (beef, turkey, chicken). Avoid dry grissly meats.   -Okay to have deli meats such as ham, turkey, chicken.  -Do not eat nuts or seeds.   -Can have smooth peanut butter or smooth almond butter. (not chunky).  -Ok to have a small amount of avocado or guacamole (such as about 2 tablespoons).    2. Eat smaller, more frequent meals and snacks. Could aim for eating 6 smaller meals per day or could have a small meal or snack every 2-3 hours.    3. Can make your homemade smoothie for a small meal/snack (fairlife milk with banana and canned peaches/canned pears or can add some peanut butter in fairlife milk/chocolate fairlife milk or can use a strawberry protein powder and blend in banana  and fairlife milk for some examples).    4. Chew food very well before swallowing.    5. If having some loose/watery stools, some foods that may help thicken stools include unsweetened applesauce, banana, pasta, pretzels, animal crackers, white bread, potatoes (no skin).    6. Drink adequate fluids. Aim for drinking at least 8 cups of fluids (water, 100% juice, gatorade) per day.    7. If you decide to start re-introducing fiber back into your diet in the next 3-4 weeks and before our next visit in June, then gradually re-introduce fiber by adding in 1-2 grams per day, or could start with adding a small serving of a food containing fiber in your diet every couple of days.     Nutrition Monitoring and Evaluation: Will monitor adherence to nutrition recommendations at future RD visits.     Further Medical Nutrition Therapy:  Follow up scheduled for June 8, 2021 at 9:30 AM.    Patient was encouraged to call/contact RD with any further questions.    Shirley Grace, MS, RD, LD

## 2021-05-12 NOTE — LETTER
"    5/12/2021         RE: Wally Varma  [id number] [facility] [id number]        Dear Colleague,    Thank you for referring your patient, Wally Varma, to the [facility] [place]. Please see a copy of my visit note below.    Wally Varma 20 year old female who is being evaluated via a billable video visit.      The patient has been notified of following:     \"This video visit will be conducted via a call between you and your physician/provider. We have found that certain health care needs can be provided without the need for an in-person physical exam.  This service lets us provide the care you need with a video conversation.  If a prescription is necessary we can send it directly to your pharmacy.  If lab work is needed we can place an order for that and you can then stop by our lab to have the test done at a later time.    Video visits are billed at different rates depending on your insurance coverage.  Please reach out to your insurance provider with any questions.    If during the course of the call the physician/provider feels a video visit is not appropriate, you will not be charged for this service.\"    Patient has given verbal consent for Video visit? Yes  During this virtual visit the patient is located in [place], patient verifies this as the location during the entirety of this visit.     How would you like to obtain your AVS? MyChart  If you are dropped from the video visit, the video invite should be resent to: Other e-mail: my chart  Will anyone else be joining your video visit? No      Video-Visit Details    Type of service:  Video Visit    Video Start Time: 9:30 AM   Video End Time: 10:09 AM    Originating Location (pt. Location): Home    Distant Location (provider location):  [facility] [place]     Platform used for Video Visit: [employer]    During this virtual visit the patient is located in [place], patient verifies " "this as the location during the entirety of this visit.       [facility] Outpatient Medical Nutrition Therapy      Time Spent:  39 minutes  Session Type:  Follow-up visit  Referring Physician:  Wade Maguire MD  Reason for RD Visit:  Nutritional counseling, s/p ileorectal anastomosis.    Nutrition Assessment:  Patient is a 20 year old female with history that includes familiar adenomatous polyp syndrome. Pt now s/p ileorectal anastomosis, partial omentectomy, takedown of splenic fixture and flex sigmoidectomy on 4/22/21 with Dr. Maguire. She stated that she did not end up getting an ileostomy. She has been following a low fiber diet and had visit with LUIS CHERRY last week and plan is to continue low fiber diet for 3-4 more weeks. She stated that she will follow a low Fiber diet until she has her f/up visit with Dr. Maguire on June 7th to see what he recommends at that time. She wants to be very careful and continue a low fiber diet and then carefully re-introduce when able. She stated that she has been following a low fiber diet but can be difficult at times because she may be craving something but it's not low in fiber and can't eat it. Her appetite is increasing too. She has been afraid to eat chicken for example because she wasn't sure if this would be tolerated or cause issues for her. She has been eating softer lower fiber foods, only has some mashed potatoes otherwise has not been eating any other cooked vegetables. She is eating a few smaller meals per day and some snacks. She tries to eat dinner a little earlier too just so she doesn't have any issues overnight. She is drinking about 4 cups water and 2 boxes of 100% juice. She is having about 2 bowel movements per day and they are starting to be more formed. She is taking imodium once per day. She is interested in diet education review for low fiber diet.    Height:   Ht Readings from Last 1 Encounters:   05/06/21 1.6 m (5' 3\")     Weight:  Down 6% over " "past month.  Wt Readings from Last 10 Encounters:   05/06/21 56.7 kg (125 lb)   04/22/21 60.1 kg (132 lb 7.9 oz)   04/12/21 61 kg (134 lb 6.4 oz)   04/12/21 60.6 kg (133 lb 8 oz)   02/08/21 54.4 kg (120 lb)   08/10/20 54.4 kg (120 lb)   07/20/20 54.9 kg (121 lb)   03/11/20 54 kg (119 lb)   12/09/19 51.3 kg (113 lb)     BMI:   Estimated body mass index is 22.14 kg/m  as calculated from the following:    Height as of 5/6/21: 1.6 m (5' 3\").    Weight as of 5/6/21: 56.7 kg (125 lb).    Diet Recall:  (some usual recent meals):.  Meal Food    Breakfast Bagel with egg and banana   Lunch Cheese pizza or bagel bites or Pasta with chelly sauce   Dinner Brat with mashed potatoes or cheese pizza   Snacks goldfish crackers or applesauce, banana, pudding, or pretzels   Beverages ~2 large cups water (likely 32 oz total) Water, 16 oz apple juice or fruit punch, sometimes gatorade          Labs:    Last Comprehensive Metabolic Panel:  Sodium   Date Value Ref Range Status   04/12/2021 137 133 - 144 mmol/L Final     Potassium   Date Value Ref Range Status   04/22/2021 3.9 3.4 - 5.3 mmol/L Final     Chloride   Date Value Ref Range Status   04/12/2021 106 94 - 109 mmol/L Final     Carbon Dioxide   Date Value Ref Range Status   04/12/2021 28 20 - 32 mmol/L Final     Anion Gap   Date Value Ref Range Status   04/12/2021 3 3 - 14 mmol/L Final     Glucose   Date Value Ref Range Status   04/12/2021 81 70 - 99 mg/dL Final     Urea Nitrogen   Date Value Ref Range Status   04/12/2021 11 7 - 30 mg/dL Final     Creatinine   Date Value Ref Range Status   04/12/2021 0.57 0.52 - 1.04 mg/dL Final     GFR Estimate   Date Value Ref Range Status   04/12/2021 >90 >60 mL/min/[1.73_m2] Final     Comment:     Non  GFR Calc  Starting 12/18/2018, serum creatinine based estimated GFR (eGFR) will be   calculated using the Chronic Kidney Disease Epidemiology Collaboration   (CKD-EPI) equation.       Calcium   Date Value Ref Range Status "   04/12/2021 9.0 8.5 - 10.1 mg/dL Final       CBC RESULTS: No results for input(s): WBC, RBC, HGB, HCT, MCV, MCH, MCHC, RDW, PLT in the last 42893 hours.    Pertinent Medications/vitamin and mineral supplements:    Current Outpatient Medications   Medication     acetaminophen (TYLENOL) 500 MG tablet     fexofenadine (ALLEGRA) 60 MG tablet     ibuprofen (ADVIL/MOTRIN) 800 MG tablet     loperamide (IMODIUM) 2 MG capsule     oxyCODONE (ROXICODONE) 5 MG tablet     No current facility-administered medications for this visit.      Food Allergies:  NKFA    MALNUTRITION:  % Weight Loss:  > 5% in 1 month (severe malnutrition)  % Intake:  Decreased intake does not meet criteria for malnutrition   Subcutaneous Fat Loss:  None observed  Muscle Loss:  None observed  Fluid Retention:  None noted    Malnutrition Diagnosis: Patient does not meet two of the above criteria necessary for diagnosing malnutrition  In Context of:  Acute illness or injury  Chronic illness or disease    Nutrition Diagnosis:    Previous: Food and nutrition related knowledge deficit related to lack of previous diet education for ileostomy as evidenced by pt report and interest in diet education.-Amended    Current: Food and nutrition related knowledge deficit related to lack of complete recall of previous diet education/interested in review of previous diet education as evidenced by pt report and interest in diet education review and with questions.    Nutrition Prescription: low fiber diet and adequate fluids.    Nutrition Intervention:    Nutrition Education/Counseling:  Provided diet education review for low fiber diet. Gave pt specific tips and suggestions for additional foods she can have based on her food preferences while still following a low fiber. Explained that she can have peeled, well cooked vegetables such as well-cooked carrots, sweet potatoes (no skin), or other well cooked ones. Also told pt she can eat meat such as chicken or turkey or fish  and deli meats okay as well such as deli ham or turkey as well as cheese. Additionally since she like yoshi explained that okay for some ripe melon as well as this is okay on a low fiber diet. Encouraged her to drink adequate fluids to drink at least 8 cups (64 oz) per day of water, gatorade, 100% juice. Answered patient's questions. Patient verbalized understanding of education provided. See Goals below.     Educational Materials Provided:  Nutrition care manual: Ileostomy Nutrition therapy    Goals:    1. Continue following a low fiber diet for the next 3-4 weeks per LUIS Frey CNP recommendations.    For low fiber diet: (see the list of foods in the handout I sent for addition ideas).  -Avoid raw vegetables. Remove skins and peels from vegetables and cook vegetables very well/until more mushy before eating. Avoid any stringy vegetables.  -OK to eat canned peaches, canned pear, as well as applesauce, canned mandarin oranges, ripe bananas, ripe cantaloupe or ripe honeydew melon.  -Can eat cooked potatoes (no skin), cooked sweet potatoes (no skin), and cooked winter squash (such as butternut squash or acorn squash), no skins.  -Avoid whole grain breads/crackers. Ok to Choose white bread, low fiber crackers such as saltines, white rice, regular pasta is fine as well not whole wheat).  -Okay to eat rice crispies, rice chex, special K, corn flakes cereal for example or cream of rice or cream of wheat cereal okay too.   -Okay to eat tender/moist meat, chicken, turkey, fish, tofu, ground meats (beef, turkey, chicken). Avoid dry grissly meats.   -Okay to have deli meats such as ham, turkey, chicken.  -Do not eat nuts or seeds.   -Can have smooth peanut butter or smooth almond butter. (not chunky).  -Ok to have a small amount of avocado or guacamole (such as about 2 tablespoons).    2. Eat smaller, more frequent meals and snacks. Could aim for eating 6 smaller meals per day or could have a small  meal or snack every 2-3 hours.    3. Can make your homemade smoothie for a small meal/snack (fairlife milk with banana and canned peaches/canned pears or can add some peanut butter in fairlife milk/chocolate fairlife milk or can use a strawberry protein powder and blend in banana and fairlife milk for some examples).    4. Chew food very well before swallowing.    5. If having some loose/watery stools, some foods that may help thicken stools include unsweetened applesauce, banana, pasta, pretzels, animal crackers, white bread, potatoes (no skin).    6. Drink adequate fluids. Aim for drinking at least 8 cups of fluids (water, 100% juice, gatorade) per day.    7. If you decide to start re-introducing fiber back into your diet in the next 3-4 weeks and before our next visit in June, then gradually re-introduce fiber by adding in 1-2 grams per day, or could start with adding a small serving of a food containing fiber in your diet every couple of days.     Nutrition Monitoring and Evaluation: Will monitor adherence to nutrition recommendations at future RD visits.     Further Medical Nutrition Therapy:  Follow up scheduled for June 8, 2021 at 9:30 AM.    Patient was encouraged to call/contact RD with any further questions.    Shirley Grace MS, RD, LD

## 2021-05-24 ENCOUNTER — OFFICE VISIT (OUTPATIENT)
Dept: FAMILY MEDICINE | Facility: CLINIC | Age: 21
End: 2021-05-24
Payer: COMMERCIAL

## 2021-05-24 ENCOUNTER — CARE COORDINATION (OUTPATIENT)
Dept: SURGERY | Facility: CLINIC | Age: 21
End: 2021-05-24

## 2021-05-24 VITALS
OXYGEN SATURATION: 97 % | HEART RATE: 96 BPM | BODY MASS INDEX: 21.79 KG/M2 | WEIGHT: 123 LBS | SYSTOLIC BLOOD PRESSURE: 105 MMHG | DIASTOLIC BLOOD PRESSURE: 62 MMHG | TEMPERATURE: 98.5 F | RESPIRATION RATE: 14 BRPM

## 2021-05-24 DIAGNOSIS — L23.1 ALLERGIC CONTACT DERMATITIS DUE TO ADHESIVES: Primary | ICD-10-CM

## 2021-05-24 DIAGNOSIS — L50.9 URTICARIA: ICD-10-CM

## 2021-05-24 PROCEDURE — 99213 OFFICE O/P EST LOW 20 MIN: CPT | Performed by: PHYSICIAN ASSISTANT

## 2021-05-24 RX ORDER — PREDNISONE 20 MG/1
40 TABLET ORAL DAILY
Qty: 10 TABLET | Refills: 0 | Status: SHIPPED | OUTPATIENT
Start: 2021-05-24 | End: 2021-05-29

## 2021-05-24 RX ORDER — CETIRIZINE HYDROCHLORIDE 10 MG/1
10 TABLET ORAL DAILY PRN
Qty: 14 TABLET | Refills: 0 | Status: SHIPPED | OUTPATIENT
Start: 2021-05-24 | End: 2022-01-31

## 2021-05-24 RX ORDER — FAMOTIDINE 40 MG/1
40 TABLET, FILM COATED ORAL 2 TIMES DAILY PRN
Qty: 28 TABLET | Refills: 0 | Status: SHIPPED | OUTPATIENT
Start: 2021-05-24 | End: 2021-06-07

## 2021-05-24 RX ORDER — HYDROXYZINE PAMOATE 25 MG/1
25 CAPSULE ORAL
Qty: 14 CAPSULE | Refills: 0 | Status: SHIPPED | OUTPATIENT
Start: 2021-05-24 | End: 2021-09-21

## 2021-05-24 RX ORDER — TRIAMCINOLONE ACETONIDE 1 MG/G
CREAM TOPICAL 2 TIMES DAILY PRN
Qty: 30 G | Refills: 0 | Status: SHIPPED | OUTPATIENT
Start: 2021-05-24 | End: 2021-09-21

## 2021-05-24 NOTE — PATIENT INSTRUCTIONS
Kris Marr,    Thank you for allowing [facility] to manage your care.    I sent your prescriptions to your pharmacy.    For itching not controlled by the other medications, please use hydroxyzine as prescribed. Do not use this medication while driving, operating machinery, with other sedating medications, or while drinking alcohol as it will make you drowsy.    If you have any questions or concerns, please feel free to call us at [phone]    Sincerely,    Ramon Gama PA-C    Did you know?      You can schedule a video visit for follow-up appointments as well as future appointments for certain conditions.  Please see the below link.     [email or web address]    If you have not already done so,  I encourage you to sign up for [employer] ([email or web address]).  This will allow you to review your results, securely communicate with a provider, and schedule virtual visits as well.    We are now scheduling all people age 12 and older for COVID-19 vaccines (patients age 12-17 can only  the Pfizer vaccine).     To schedule your appointment please log in to OptuLink using this link to see when and where we have openings. Appointments open up throughout the week, check back for additional openings.     If there are no appointments left, you will be unable to schedule. If you have technical difficulty using OptuLink, call [phone] for assistance.  If you would like to be added to our standby list, do that on our website: here.    Patients 12-17 years old must schedule their appointment at a location offering the Pfizer vaccine.  First dose Pfizer vaccines are available at the following sites with convenient hours, including Saturday appointments:    [facility] (former clinic, now serving as a vaccination-only site)    [facility] -  "[place]    Appointments for  Moderna and [employer] ([employer]) COVID-19 Vaccines for those 18 years and older is available at many locations in our system.  More information is on our website: [email or web address]. Check this website to stay up to date on COVID-19 vaccination information        Patient Education     Contact Dermatitis  Contact dermatitis is a skin rash caused by something that touches the skin and makes it irritated and inflamed. Your skin may be red, swollen, dry, and may be cracked. Blisters may form and ooze. The rash will itch.   Contact dermatitis often forms on the face and neck, backs of hands, forearms, genitals, and lower legs. But it can affect any area.   People can get contact dermatitis from lots of sources. These include:    Plants such as poison ivy, oak, or sumac    Chemicals in hair dyes and rinses, soaps, solvents, waxes, fingernail polish, and deodorants     Jewelry or watchbands made of nickel or cobalt  Contact dermatitis is not passed from person to person.  Talk with your healthcare provider about what may have caused the rash. A type of allergy testing called \"patch testing\" may be used to discover what you are allergic to. You will need to stay away from the source of the rash in the future to prevent it from coming back.   Treatment is done to ease itching and prevent the rash from coming back. The rash should go away in a few days to a few weeks.   Home care  Your healthcare provider may prescribe medicine to ease swelling and itching. Follow all instructions when using these medicines.   General care    Stay away from anything that heats up your skin, such as hot showers or baths, or direct sunlight. This can make itching worse.    Apply cold compresses to soothe your sores to help ease your symptoms. Do this for 30 minutes 3 to 4 times a day. You can make a cold compress by soaking a cloth in cold water. Squeeze out excess " water. You can add colloidal oatmeal to the water to help reduce itching. For severe itching in a small area, apply an ice pack wrapped in a thin towel. Do this for 20 minutes 3 to 4 times a day.    You can also try wet dressings. One way to do this is to wear a wet piece of clothing under a dry one. Wear a damp shirt under a dry shirt if your upper body is affected. This can relieve itching and prevent you from scratching the affected area.    You can also help ease large areas of itching by taking a lukewarm bath with colloidal oatmeal added to the water.    Use hydrocortisone cream for redness and irritation, unless another medicine was prescribed. Calamine lotion can also relieve mild symptoms.    Use oral diphenhydramine to help reduce itching. You can buy this antihistamine at drugstores and grocery stores. It can make you sleepy, so use lower doses during the daytime. Don't use diphenhydramine if you have glaucoma or have trouble urinating because of an enlarged prostate.    If a plant causes your rash, make sure to wash your skin and the clothes you were wearing when you came into contact with the plant. This is to wash away the plant oils that gave you the rash and prevent more or worse symptoms. If you have a pet that's been outdoors, its fur may also have oil from the plant. Bathe your pet with soap or shampoo.    Stay away from the substance or object that causes your symptoms. If you can t stay away from it, wear gloves or some other type of protection    Follow-up care  Follow up with your healthcare provider, or as advised.  When to seek medical advice  Call your healthcare provider or seek medical attention right away if any of these occur:     Spreading of the rash to other parts of your body    Severe swelling of your face, eyelids, mouth, throat or tongue    Trouble urinating due to swelling in the genital area    Fever of 100.4 F (38 C) or higher, or as advised by your provider    Redness or  swelling that gets worse    Pain that gets worse    Foul-smelling fluid leaking from the skin    Yellow-brown crusts on the open blisters  [employer] last reviewed this educational content on 8/1/2019 2000-2021 [employer]. All rights reserved. This information is not intended as a substitute for professional medical care. Always follow your healthcare professional's instructions.

## 2021-05-24 NOTE — PROGRESS NOTES
Assessment & Plan   Problem List Items Addressed This Visit     None      Visit Diagnoses     Allergic contact dermatitis due to adhesives    -  Primary    Relevant Medications    triamcinolone (KENALOG) 0.1 % external cream    cetirizine (ZYRTEC) 10 MG tablet    famotidine (PEPCID) 40 MG tablet    predniSONE (DELTASONE) 20 MG tablet    hydrOXYzine (VISTARIL) 25 MG capsule    Urticaria        Relevant Medications    triamcinolone (KENALOG) 0.1 % external cream    cetirizine (ZYRTEC) 10 MG tablet    predniSONE (DELTASONE) 20 MG tablet         It is my impression based on the historical events and the physical exam that this is contact dermatitis from bandage adhesive.  I do not believe the patient has concurrent cellulitis, abscess, anaphylaxis, angioedema, or necrotizing fasciitis. I encouraged her to remove the last remaining bandage and bath with soap and water. I will put her on a course of antihistamines and topical steroids. If she is not improving in 3 days and her surgeon approves, she could take a po prednisone course.     Complete history and physical exam as below. AF with normal VS.    DDx and Dx discussed with and explained to the pt to their satisfaction.  All questions were answered at this time. Pt expressed understanding of and agreement with this dx, tx, and plan. No further workup warranted and standard medication warnings given. I have given the patient a list of pertinent indications for re-evaluation. Will go to the Emergency Department if symptoms worsen or new concerning symptoms arise. Patient left in no apparent distress.     29 minutes spent on the date of the encounter doing chart review, history and exam, documentation and further activities per the note       See Patient Instructions    Return in about 2 weeks (around 6/7/2021) for your already scheduled appointment, or call 911/go to an ER anytime if worsening.    FELIX Sultana  [facility] FELICE Ramirez    Justa is a 20 year old who presents for the following health issues:    HPI   Had colectomy one month ago. Recently removed bandage one week ago and has had itchy red rash   Rash  Onset/Duration: 1 week  Description  Location: arms, neck, stomach, legs  Character: red, dry  Itching: severe  Intensity:  moderate  Progression of Symptoms:  worsening  Accompanying signs and symptoms:   Fever: no  Body aches or joint pain: no  Sore throat symptoms: no  Recent cold symptoms: no  History:           Previous episodes of similar rash: Yes  New exposures:  medication Oxycodone and foods - strawberry, kiwi drink  Recent travel: no  Exposure to similar rash: no  Precipitating or alleviating factors: lotion made it better  Therapies tried and outcome: Benadryl/diphenhydramine -  not effective and Renew lotion      Review of Systems   Constitutional, HEENT, cardiovascular, pulmonary, gi and gu systems are negative, except as otherwise noted.      Objective    /62   Pulse 96   Temp 98.5  F (36.9  C) (Tympanic)   Resp 14   Wt 55.8 kg (123 lb)   LMP 04/24/2021 (Exact Date)   SpO2 97%   BMI 21.79 kg/m    Body mass index is 21.79 kg/m .  Physical Exam  Vitals signs and nursing note reviewed.   Constitutional:       General: She is not in acute distress.     Appearance: She is not ill-appearing or diaphoretic.   HENT:      Head: Normocephalic and atraumatic.      Mouth/Throat:      Mouth: Mucous membranes are moist.   Eyes:      Conjunctiva/sclera: Conjunctivae normal.   Cardiovascular:      Rate and Rhythm: Normal rate and regular rhythm.      Heart sounds: Normal heart sounds. No murmur. No friction rub. No gallop.    Pulmonary:      Effort: Pulmonary effort is normal. No respiratory distress.      Breath sounds: Normal breath sounds. No stridor. No wheezing, rhonchi or rales.   Abdominal:      General: Bowel sounds are normal. There is no distension.      Palpations: Abdomen is soft. There is no mass.       Tenderness: There is no abdominal tenderness. There is no guarding or rebound.      Hernia: No hernia is present.   Skin:     General: Skin is warm and dry.      Comments: Erythematous and crusting rash to the lower abdomen in a rectangular shape. Surgical wounds healing well with no dehiscence or drainage. Erythematous wheals to the anterior forearms bilaterally.   Neurological:      General: No focal deficit present.      Mental Status: She is alert. Mental status is at baseline.   Psychiatric:         Mood and Affect: Mood normal.         Behavior: Behavior normal.

## 2021-05-24 NOTE — PROGRESS NOTES
"Call rec'd from patient who reported hives around incision and along arms.  States she's been taking Benadryl (25mg) since Thursday at at bedtime and while that helps, she awakens with intense itching and hive-like lesions on her abdomen and arms.    Had removed abdominal binder a week ago after she felt she had small bumps and itching where binder had touched skin.  Reports \"bumps\" and itching are now significantly worse.    Reports some of the gel-dressing is still intact over incision.    Denied difficulty swallowing/breathing.    Stated she's newly used the following:   Essential oils on her skin   Strawberry-kiwi fruit juice drink.    I advised her to stop using the above; take Benadryl. 50mg po x1 now and if no improvement by this afternoon, see further evaluation by PCP.       Patient voiced understanding of and agreement with plan of care.        "

## 2021-06-03 NOTE — PROGRESS NOTES
"Colon and Rectal Surgery Follow-up Clinic Note      RE: Wally Varma  : 2000  DIMAS: 2021    DIAGNOSIS: 21 year old female with attenuated familial adenomatous polyposis who is now status post laparoscopic total abdominal colectomy with ileorectal anastomosis, partial omentectomy, takedown of splenic flexure, and flexible sigmoidoscopy on 2021.    INTERVAL HISTORY: Denies increased pain, fevers, or chills. Tolerating diet well.  Recently had her wisdom teeth removed and they placed her on antibiotics for 10 days.  This has given her diarrhea that is not responding to the fiber and Imodium.  She was normally having 3 soft bowel movements per day with no seepage or incontinence. No nocturnal bowel movements.  Denies urinary issues.  No blood per rectum. Off narcotic pain meds.    Physical Examination:  /70 (BP Location: Left arm, Patient Position: Sitting, Cuff Size: Adult Regular)   Pulse 91   Ht 5' 3\"   Wt 123 lb 3.2 oz   SpO2 98%   BMI 21.82 kg/m    Abdomen soft, nontender. Incisions with no evidence of infection or hernia.    ASSESSMENT  Doing well postop.    FINAL DIAGNOSIS:   A. TOTAL ABDOMINAL COLECTOMY, PARTIAL OMENTECTOMY:   - Colonic wall with intramucosal and sub-mucosal lymphoid aggregates and   superficial hyperplastic changes   - No evidence of neoplastic polyp   - Appendix with fibrous obliteration of lumen   - Seven benign lymph nodes (0/7)     B. ANASTOMOSIS RINGS:   - Colonic mucosa with two tubular adenomas, negative for high-grade   dysplasia   - Ileal wall with no significant histopathologic abnormality     PLAN  1.  Regular diet.  2.  No activity restrictions.  3.  I asked Justa to reach out to her dentist to see if she can get off of the antibiotics as it is already been 7 days and she has not had any issues. I also told her to start a probiotic and restart her fiber and Imodium which has worked for her in the past.  4.  Start Sulindac 150 mg PO twice daily.    5.  " Schedule flex sig at [facility] in 2 months.    30 minutes spent on the date of the encounter doing chart review, history and exam, documentation and further activities as noted above.    Wade Maguire M.D., M.Sc.     Division of Colon and Rectal Surgery  [facility]    Referring Provider:  DO KARLEE Barry  [address] [place] [address]      Primary Care Provider:  No primary care provider on file.      CC:  Flori Healy MD

## 2021-06-07 ENCOUNTER — OFFICE VISIT (OUTPATIENT)
Dept: SURGERY | Facility: CLINIC | Age: 21
End: 2021-06-07
Payer: COMMERCIAL

## 2021-06-07 VITALS
OXYGEN SATURATION: 98 % | HEART RATE: 91 BPM | DIASTOLIC BLOOD PRESSURE: 70 MMHG | SYSTOLIC BLOOD PRESSURE: 110 MMHG | HEIGHT: 63 IN | BODY MASS INDEX: 21.83 KG/M2 | WEIGHT: 123.2 LBS

## 2021-06-07 DIAGNOSIS — D13.91 FAP (FAMILIAL ADENOMATOUS POLYPOSIS): ICD-10-CM

## 2021-06-07 DIAGNOSIS — Z09 FOLLOW-UP EXAMINATION AFTER COLORECTAL SURGERY: Primary | ICD-10-CM

## 2021-06-07 PROCEDURE — 99024 POSTOP FOLLOW-UP VISIT: CPT | Performed by: COLON & RECTAL SURGERY

## 2021-06-07 RX ORDER — AMOXICILLIN 500 MG/1
CAPSULE ORAL
COMMUNITY
Start: 2021-06-01 | End: 2021-09-21

## 2021-06-07 RX ORDER — SULINDAC 150 MG/1
150 TABLET ORAL 2 TIMES DAILY
Qty: 120 TABLET | Refills: 11 | Status: SHIPPED | OUTPATIENT
Start: 2021-06-07 | End: 2022-04-18

## 2021-06-07 ASSESSMENT — PAIN SCALES - GENERAL: PAINLEVEL: NO PAIN (0)

## 2021-06-07 ASSESSMENT — MIFFLIN-ST. JEOR: SCORE: 1282.96

## 2021-06-07 NOTE — NURSING NOTE
"Chief Complaint   Patient presents with     Post-op Visit     Post-op 4/22/2021       Vitals:    06/07/21 1100   BP: 110/70   BP Location: Left arm   Patient Position: Sitting   Cuff Size: Adult Regular   Pulse: 91   SpO2: 98%   Weight: 123 lb 3.2 oz   Height: 5' 3\"       Body mass index is 21.82 kg/m .    Lyssa Caro CMA    "

## 2021-06-07 NOTE — LETTER
"2021       RE: Wally Varma  [address] [address] [address]     Dear Colleague,    Thank you for referring your patient, Wally Varma, to the [facility] COLON AND RECTAL SURGERY CLINIC [place] [facility]. Please see a copy of my visit note below.    Colon and Rectal Surgery Follow-up Clinic Note    RE: Wally Varma  : 2000  DIMAS: 2021    DIAGNOSIS: 21 year old female with attenuated familial adenomatous polyposis who is now status post laparoscopic total abdominal colectomy with ileorectal anastomosis, partial omentectomy, takedown of splenic flexure, and flexible sigmoidoscopy on 2021.    INTERVAL HISTORY: Denies increased pain, fevers, or chills. Tolerating diet well.  Recently had her wisdom teeth removed and they placed her on antibiotics for 10 days.  This has given her diarrhea that is not responding to the fiber and Imodium.  She was normally having 3 soft bowel movements per day with no seepage or incontinence. No nocturnal bowel movements.  Denies urinary issues.  No blood per rectum. Off narcotic pain meds.    Physical Examination:  /70 (BP Location: Left arm, Patient Position: Sitting, Cuff Size: Adult Regular)   Pulse 91   Ht 5' 3\"   Wt 123 lb 3.2 oz   SpO2 98%   BMI 21.82 kg/m    Abdomen soft, nontender. Incisions with no evidence of infection or hernia.    ASSESSMENT  Doing well postop.    FINAL DIAGNOSIS:   A. TOTAL ABDOMINAL COLECTOMY, PARTIAL OMENTECTOMY:   - Colonic wall with intramucosal and sub-mucosal lymphoid aggregates and   superficial hyperplastic changes   - No evidence of neoplastic polyp   - Appendix with fibrous obliteration of lumen   - Seven benign lymph nodes (0/7)     B. ANASTOMOSIS RINGS:   - Colonic mucosa with two tubular adenomas, negative for high-grade   dysplasia   - Ileal wall with no significant histopathologic abnormality     PLAN  1.  Regular diet.  2.  " No activity restrictions.  3.  I asked Justa to reach out to her dentist to see if she can get off of the antibiotics as it is already been 7 days and she has not had any issues. I also told her to start a probiotic and restart her fiber and Imodium which has worked for her in the past.  4.  Start Sulindac 150 mg PO twice daily.    5.  Schedule flex sig at [facility] in 2 months.    30 minutes spent on the date of the encounter doing chart review, history and exam, documentation and further activities as noted above.    Wade Maguire M.D., M.Sc.     Division of Colon and Rectal Surgery  [facility]    Referring Provider:  DO BRENNEN Barry [place]  [address] [place] [address]      Primary Care Provider:  No primary care provider on file.      CC:  Flori Healy MD

## 2021-06-08 ENCOUNTER — VIRTUAL VISIT (OUTPATIENT)
Dept: GASTROENTEROLOGY | Facility: CLINIC | Age: 21
End: 2021-06-08
Payer: COMMERCIAL

## 2021-06-08 DIAGNOSIS — Z71.3 NUTRITIONAL COUNSELING: Primary | ICD-10-CM

## 2021-06-08 DIAGNOSIS — D13.91 FAP (FAMILIAL ADENOMATOUS POLYPOSIS): ICD-10-CM

## 2021-06-08 PROCEDURE — 97803 MED NUTRITION INDIV SUBSEQ: CPT | Mod: 95 | Performed by: DIETITIAN, REGISTERED

## 2021-06-08 NOTE — PATIENT INSTRUCTIONS
It was nice speaking with you today. Below are the nutrition recommendations we discussed at your visit.    Please let me know if you have any additional questions.    Nutrition recommendations    1. Okay to follow a regular diet.  --gradually re-introduce foods back into your diet. As you add back in some higher fiber foods like plums or cherries, start with a small serving and if tolerated the next day can have a bigger serving for example.    --At first you can try more of a variety of cooked vegetables and then transition to having some raw vegetables.  --Also can try a small side salad first versus a large one for example. If tolerated, can increase portion for the next time.    2. Continue drinking at least 1 Boost drink per day.   --Can bring a Boost drink along with other foods you know you tolerate to work (such as applesauce, banana, mashed potato).    --After you get your next 2 wisdom teeth out, recommend drinking more of these Boost drinks especially if you are unable to eat or tolerate much and need soft diet/liquid meals.    3. Keep daily food and beverage journals and can track any symptoms you experience (if you experience any symptoms).    4. Continue drinking at least 48 oz-64 oz fluids such as water, gatorade zero per day.    Follow-up in 1-2 months or as needed.    If you would like to schedule a follow up appointment with Shirley Grace, Registered Dietitian, please call [phone].    Shirley Grace, MS, RD, LD

## 2021-06-08 NOTE — PROGRESS NOTES
"Wally Varma 21 year old female who is being evaluated via a billable video visit.      The patient has been notified of following:     \"This video visit will be conducted via a call between you and your physician/provider. We have found that certain health care needs can be provided without the need for an in-person physical exam.  This service lets us provide the care you need with a video conversation.  If a prescription is necessary we can send it directly to your pharmacy.  If lab work is needed we can place an order for that and you can then stop by our lab to have the test done at a later time.    Video visits are billed at different rates depending on your insurance coverage.  Please reach out to your insurance provider with any questions.    If during the course of the call the physician/provider feels a video visit is not appropriate, you will not be charged for this service.\"    Patient has given verbal consent for Video visit? Yes  During this virtual visit the patient is located in [place], patient verifies this as the location during the entirety of this visit.     How would you like to obtain your AVS? MyChart  If you are dropped from the video visit, the video invite should be resent to: Other e-mail: my chart  Will anyone else be joining your video visit? No      Video-Visit Details    Type of service:  Video Visit    Video Start Time: 9:30 AM   Video End Time: 10:18 AM    Originating Location (pt. Location): Home    Distant Location (provider location):  [facility] [place]     Platform used for Video Visit: Koemei    During this virtual visit the patient is located in [place], patient verifies this as the location during the entirety of this visit.       [facility] Outpatient Medical Nutrition Therapy      Time Spent:  48 minutes  Session Type:  Follow-up visit  Referring Physician:  Wade Maguire MD  Reason for RD Visit:  Nutritional counseling, s/p ileorectal " "anastomosis.    Nutrition Assessment:  Patient is a 21 year old female with history that includes familiar adenomatous polyp syndrome. Pt now s/p ileorectal anastomosis, partial omentectomy, takedown of splenic fixture and flex sigmoidectomy on 4/22/21 with Dr. Maguire. She stated that she did not end up getting an ileostomy. She has been following a low fiber/low residue diet since her surgery (over the last couple of months). She stated that since our last visit, she ended up getting a couple of wisdom teeth removed and went on antibiotics. The antibiotics caused issues with diarrhea and she was also limited on what she could eat due to follow a softer, low fiber/residue diet. She had a visit with MD yesterday and was given permission to eat a regular diet. She also discontinued antibiotics yesterday. She stated in just 1 day of stopping the antibiotics, she is already starting to feel better. She is interested in gradually increasing her diet to a regular one. She wants to be careful though as she increases her intake/variety of diet. Additionally, she stated that she has been drinking 1 Boost drink daily. She liked getting these in the hospital so currently drinking again. Her drinks contain 240 kcals and 10 g protein. She also went back to work for 1 day so far. She is wondering what she could bring to work for lunch because she doesn't get a long break and also does not want to have to use the restroom to have a BM while working if possible.    Height:   Ht Readings from Last 1 Encounters:   06/07/21 1.6 m (5' 3\")     Weight:  Down 6% over past month.  Wt Readings from Last 10 Encounters:   06/07/21 55.9 kg (123 lb 3.2 oz)   05/24/21 55.8 kg (123 lb)   05/06/21 56.7 kg (125 lb)   04/22/21 60.1 kg (132 lb 7.9 oz)   04/12/21 61 kg (134 lb 6.4 oz)   04/12/21 60.6 kg (133 lb 8 oz)   02/08/21 54.4 kg (120 lb)   08/10/20 54.4 kg (120 lb)   07/20/20 54.9 kg (121 lb)   03/11/20 54 kg (119 lb)     BMI:   Estimated " "body mass index is 21.82 kg/m  as calculated from the following:    Height as of 6/7/21: 1.6 m (5' 3\").    Weight as of 6/7/21: 55.9 kg (123 lb 3.2 oz).    Diet Recall:  (some usual recent meals):after our last visit, she started eating bigger meals due to feeling more hungry but then she had her wisdom teeth pulled  Meal Food    Breakfast Cereal or eggs and bagel, banana or after wisdom teeth removed just applesauce   Lunch Burger on bun with pickles and ketchup yesterday or before wisdom teeth pulled ham and cheese sandwich or after wisdom teeth pulled: mac and cheese    Dinner Tortellini pasta or pizza or for awhile after teeth pulled mac and cheese   Snacks Jello pudding, applesauce, banana, 1 or more boost drink (10g pro, 240 alysa).   Beverages 48 oz Water, sometimes gatorade zero, sometimes hiC          Labs:    Last Comprehensive Metabolic Panel:  Sodium   Date Value Ref Range Status   04/12/2021 137 133 - 144 mmol/L Final     Potassium   Date Value Ref Range Status   04/22/2021 3.9 3.4 - 5.3 mmol/L Final     Chloride   Date Value Ref Range Status   04/12/2021 106 94 - 109 mmol/L Final     Carbon Dioxide   Date Value Ref Range Status   04/12/2021 28 20 - 32 mmol/L Final     Anion Gap   Date Value Ref Range Status   04/12/2021 3 3 - 14 mmol/L Final     Glucose   Date Value Ref Range Status   04/12/2021 81 70 - 99 mg/dL Final     Urea Nitrogen   Date Value Ref Range Status   04/12/2021 11 7 - 30 mg/dL Final     Creatinine   Date Value Ref Range Status   04/12/2021 0.57 0.52 - 1.04 mg/dL Final     GFR Estimate   Date Value Ref Range Status   04/12/2021 >90 >60 mL/min/[1.73_m2] Final     Comment:     Non  GFR Calc  Starting 12/18/2018, serum creatinine based estimated GFR (eGFR) will be   calculated using the Chronic Kidney Disease Epidemiology Collaboration   (CKD-EPI) equation.       Calcium   Date Value Ref Range Status   04/12/2021 9.0 8.5 - 10.1 mg/dL Final       CBC RESULTS: No results for " input(s): WBC, RBC, HGB, HCT, MCV, MCH, MCHC, RDW, PLT in the last 36907 hours.    Pertinent Medications/vitamin and mineral supplements:    Current Outpatient Medications   Medication     amoxicillin (AMOXIL) 500 MG capsule     cetirizine (ZYRTEC) 10 MG tablet     hydrOXYzine (VISTARIL) 25 MG capsule     loperamide (IMODIUM) 2 MG capsule     sulindac (CLINORIL) 150 MG tablet     triamcinolone (KENALOG) 0.1 % external cream     No current facility-administered medications for this visit.      Food Allergies:  NKFA    MALNUTRITION:  % Weight Loss:  > 7.5% in 3 months (severe malnutrition)  % Intake:  Decreased intake does not meet criteria for malnutrition   Subcutaneous Fat Loss:  None observed  Muscle Loss:  None observed  Fluid Retention:  None noted    Malnutrition Diagnosis: Patient does not meet two of the above criteria necessary for diagnosing malnutrition  In Context of:  Acute illness or injury  Chronic illness or disease    Nutrition Diagnosis:      Current: Food and nutrition related knowledge deficit related to lack of complete recall of previous diet education/interested in review of previous diet education as evidenced by pt report and interest in diet education review and with questions.- ongoing    Nutrition Prescription: low fiber diet and adequate fluids.    Nutrition Intervention:    Nutrition Education/Counseling:  Provided diet education review for gradually increasing diet to a regular one. Gave pt tips and suggestions for increasing intake and variety based on her food preferences. Also discussed some tips for intake for after she gets her other 2 wisdom teeth out in a couple of weeks. Discussed some ideas for lunch meal at work that may be well tolerated and per her preferences. Discussed adequate hydration and recommended drinking at least 48-64 oz per day.  Answered patient questions    Goals:    1. Okay to follow a regular diet.  --gradually re-introduce foods back into your diet. As you  add back in some higher fiber foods like plums or cherries, start with a small serving and if tolerated the next day can have a bigger serving for example.    --At first you can try more of a variety of cooked vegetables and then transition to having some raw vegetables.  --Also can try a small side salad first versus a large one for example. If tolerated, can increase portion for the next time.    2. Continue drinking at least 1 Boost drink per day.   --Can bring a Boost drink along with other foods you know you tolerate to work (such as applesauce, banana, mashed potato).    --After you get your next 2 wisdom teeth out, recommend drinking more of these Boost drinks especially if you are unable to eat or tolerate much and need soft diet/liquid meals.    3. Keep daily food and beverage journals and can track any symptoms you experience (if you experience any symptoms).    4. Continue drinking at least 48 oz-64 oz fluids such as water, gatorade zero per day.    Nutrition Monitoring and Evaluation: Will monitor adherence to nutrition recommendations at future RD visits.     Further Medical Nutrition Therapy:  Follow up in 1-2 months or as needed.    Patient was encouraged to call/contact RD with any further questions.    Shirley Grace, MS, RD, LD

## 2021-06-08 NOTE — LETTER
"    6/8/2021         RE: Wally Varma  [address] [address] [facility] [address]        Dear Colleague,    Thank you for referring your patient, Wally Varma, to the [facility] [place]. Please see a copy of my visit note below.    Wally Varma 21 year old female who is being evaluated via a billable video visit.      The patient has been notified of following:     \"This video visit will be conducted via a call between you and your physician/provider. We have found that certain health care needs can be provided without the need for an in-person physical exam.  This service lets us provide the care you need with a video conversation.  If a prescription is necessary we can send it directly to your pharmacy.  If lab work is needed we can place an order for that and you can then stop by our lab to have the test done at a later time.    Video visits are billed at different rates depending on your insurance coverage.  Please reach out to your insurance provider with any questions.    If during the course of the call the physician/provider feels a video visit is not appropriate, you will not be charged for this service.\"    Patient has given verbal consent for Video visit? Yes  During this virtual visit the patient is located in [place], patient verifies this as the location during the entirety of this visit.     How would you like to obtain your AVS? MyChart  If you are dropped from the video visit, the video invite should be resent to: Other e-mail: my chart  Will anyone else be joining your video visit? No      Video-Visit Details    Type of service:  Video Visit    Video Start Time: 9:30 AM   Video End Time: 10:18 AM    Originating Location (pt. Location): Home    Distant Location (provider location):  [facility] [place]     Platform used for Video Visit: [employer]    During this virtual visit the patient is located in [place], patient verifies " "this as the location during the entirety of this visit.       [facility] Outpatient Medical Nutrition Therapy      Time Spent:  48 minutes  Session Type:  Follow-up visit  Referring Physician:  Wade Maguire MD  Reason for RD Visit:  Nutritional counseling, s/p ileorectal anastomosis.    Nutrition Assessment:  Patient is a 21 year old female with history that includes familiar adenomatous polyp syndrome. Pt now s/p ileorectal anastomosis, partial omentectomy, takedown of splenic fixture and flex sigmoidectomy on 4/22/21 with Dr. Maguire. She stated that she did not end up getting an ileostomy. She has been following a low fiber/low residue diet since her surgery (over the last couple of months). She stated that since our last visit, she ended up getting a couple of wisdom teeth removed and went on antibiotics. The antibiotics caused issues with diarrhea and she was also limited on what she could eat due to follow a softer, low fiber/residue diet. She had a visit with MD yesterday and was given permission to eat a regular diet. She also discontinued antibiotics yesterday. She stated in just 1 day of stopping the antibiotics, she is already starting to feel better. She is interested in gradually increasing her diet to a regular one. She wants to be careful though as she increases her intake/variety of diet. Additionally, she stated that she has been drinking 1 Boost drink daily. She liked getting these in the hospital so currently drinking again. Her drinks contain 240 kcals and 10 g protein. She also went back to work for 1 day so far. She is wondering what she could bring to work for lunch because she doesn't get a long break and also does not want to have to use the restroom to have a BM while working if possible.    Height:   Ht Readings from Last 1 Encounters:   06/07/21 1.6 m (5' 3\")     Weight:  Down 6% over past month.  Wt Readings from Last 10 Encounters:   06/07/21 55.9 kg (123 lb 3.2 oz)   05/24/21 55.8 " "kg (123 lb)   05/06/21 56.7 kg (125 lb)   04/22/21 60.1 kg (132 lb 7.9 oz)   04/12/21 61 kg (134 lb 6.4 oz)   04/12/21 60.6 kg (133 lb 8 oz)   02/08/21 54.4 kg (120 lb)   08/10/20 54.4 kg (120 lb)   07/20/20 54.9 kg (121 lb)   03/11/20 54 kg (119 lb)     BMI:   Estimated body mass index is 21.82 kg/m  as calculated from the following:    Height as of 6/7/21: 1.6 m (5' 3\").    Weight as of 6/7/21: 55.9 kg (123 lb 3.2 oz).    Diet Recall:  (some usual recent meals):after our last visit, she started eating bigger meals due to feeling more hungry but then she had her wisdom teeth pulled  Meal Food    Breakfast Cereal or eggs and bagel, banana or after wisdom teeth removed just applesauce   Lunch Burger on bun with pickles and ketchup yesterday or before wisdom teeth pulled ham and cheese sandwich or after wisdom teeth pulled: mac and cheese    Dinner Tortellini pasta or pizza or for awhile after teeth pulled mac and cheese   Snacks Jello pudding, applesauce, banana, 1 or more boost drink (10g pro, 240 alysa).   Beverages 48 oz Water, sometimes gatorade zero, sometimes hiC          Labs:    Last Comprehensive Metabolic Panel:  Sodium   Date Value Ref Range Status   04/12/2021 137 133 - 144 mmol/L Final     Potassium   Date Value Ref Range Status   04/22/2021 3.9 3.4 - 5.3 mmol/L Final     Chloride   Date Value Ref Range Status   04/12/2021 106 94 - 109 mmol/L Final     Carbon Dioxide   Date Value Ref Range Status   04/12/2021 28 20 - 32 mmol/L Final     Anion Gap   Date Value Ref Range Status   04/12/2021 3 3 - 14 mmol/L Final     Glucose   Date Value Ref Range Status   04/12/2021 81 70 - 99 mg/dL Final     Urea Nitrogen   Date Value Ref Range Status   04/12/2021 11 7 - 30 mg/dL Final     Creatinine   Date Value Ref Range Status   04/12/2021 0.57 0.52 - 1.04 mg/dL Final     GFR Estimate   Date Value Ref Range Status   04/12/2021 >90 >60 mL/min/[1.73_m2] Final     Comment:     Non  GFR Calc  Starting " 12/18/2018, serum creatinine based estimated GFR (eGFR) will be   calculated using the Chronic Kidney Disease Epidemiology Collaboration   (CKD-EPI) equation.       Calcium   Date Value Ref Range Status   04/12/2021 9.0 8.5 - 10.1 mg/dL Final       CBC RESULTS: No results for input(s): WBC, RBC, HGB, HCT, MCV, MCH, MCHC, RDW, PLT in the last 72847 hours.    Pertinent Medications/vitamin and mineral supplements:    Current Outpatient Medications   Medication     amoxicillin (AMOXIL) 500 MG capsule     cetirizine (ZYRTEC) 10 MG tablet     hydrOXYzine (VISTARIL) 25 MG capsule     loperamide (IMODIUM) 2 MG capsule     sulindac (CLINORIL) 150 MG tablet     triamcinolone (KENALOG) 0.1 % external cream     No current facility-administered medications for this visit.      Food Allergies:  NKFA    MALNUTRITION:  % Weight Loss:  > 7.5% in 3 months (severe malnutrition)  % Intake:  Decreased intake does not meet criteria for malnutrition   Subcutaneous Fat Loss:  None observed  Muscle Loss:  None observed  Fluid Retention:  None noted    Malnutrition Diagnosis: Patient does not meet two of the above criteria necessary for diagnosing malnutrition  In Context of:  Acute illness or injury  Chronic illness or disease    Nutrition Diagnosis:      Current: Food and nutrition related knowledge deficit related to lack of complete recall of previous diet education/interested in review of previous diet education as evidenced by pt report and interest in diet education review and with questions.- ongoing    Nutrition Prescription: low fiber diet and adequate fluids.    Nutrition Intervention:    Nutrition Education/Counseling:  Provided diet education review for gradually increasing diet to a regular one. Gave pt tips and suggestions for increasing intake and variety based on her food preferences. Also discussed some tips for intake for after she gets her other 2 wisdom teeth out in a couple of weeks. Discussed some ideas for lunch meal  at work that may be well tolerated and per her preferences. Discussed adequate hydration and recommended drinking at least 48-64 oz per day.  Answered patient questions    Goals:    1. Okay to follow a regular diet.  --gradually re-introduce foods back into your diet. As you add back in some higher fiber foods like plums or cherries, start with a small serving and if tolerated the next day can have a bigger serving for example.    --At first you can try more of a variety of cooked vegetables and then transition to having some raw vegetables.  --Also can try a small side salad first versus a large one for example. If tolerated, can increase portion for the next time.    2. Continue drinking at least 1 Boost drink per day.   --Can bring a Boost drink along with other foods you know you tolerate to work (such as applesauce, banana, mashed potato).    --After you get your next 2 wisdom teeth out, recommend drinking more of these Boost drinks especially if you are unable to eat or tolerate much and need soft diet/liquid meals.    3. Keep daily food and beverage journals and can track any symptoms you experience (if you experience any symptoms).    4. Continue drinking at least 48 oz-64 oz fluids such as water, gatorade zero per day.    Nutrition Monitoring and Evaluation: Will monitor adherence to nutrition recommendations at future RD visits.     Further Medical Nutrition Therapy:  Follow up in 1-2 months or as needed.    Patient was encouraged to call/contact RD with any further questions.    Shirley Grace, MS, RD, LD

## 2021-07-26 ENCOUNTER — TELEPHONE (OUTPATIENT)
Dept: GASTROENTEROLOGY | Facility: OUTPATIENT CENTER | Age: 21
End: 2021-07-26

## 2021-07-26 ENCOUNTER — TELEPHONE (OUTPATIENT)
Dept: GASTROENTEROLOGY | Facility: CLINIC | Age: 21
End: 2021-07-26

## 2021-07-26 DIAGNOSIS — Z11.59 ENCOUNTER FOR SCREENING FOR OTHER VIRAL DISEASES: ICD-10-CM

## 2021-07-26 NOTE — TELEPHONE ENCOUNTER
Is patient taking blood thinners/antiplatelet medications? No    Heart Disease? Denies     Lung Disease? Denies     Sleep Apnea? Denies     Diabetic? Denies     Kidney Disease? Denies     Electronic Implantable Devices? Denies     PTSD? N/a    Prep instructions reviewed with patient? Instructions,  policy, MAC sedation plan reviewed. Instructed patient to have someone stay with them for 24 hours post exam    : Yes    Pharmacy: N/a    Indication for Procedure: FAP (familial adenomatous polyposis)    Referring Provider: Courtney Jenkins MD,   Juan Ramon Lu, DO         Arrival Time: 2 PM    COVID test? 7-27 [facility]    Cheri Hood RN

## 2021-07-26 NOTE — TELEPHONE ENCOUNTER
Ting Streeter care team called to scheduled patient for a flex sig.          Scheduling Details    Colonoscopy Prep Sent?:   Procedure Scheduled: Flex Sig  Provider/Surgeon: Ting Streeter  Date of Procedure: 07/30  Location: [facility]  Caller (Please ask for phone number if not scheduled by patient):       Sedation Type: MAC  Conscious Sedation- Needs  for 6 hours after the procedure  MAC/General-Needs  for 24 hours after procedure    Pre-op Required at [facility] [place], [place] and OR for MAC sedation:   (if yes advise patient they will need a pre-op prior to procedure)      Is patient on blood thinners? -N (If yes- inform patient to follow up with PCP or provider for follow up instructions)     Informed patient they will need an adult  Y  Cannot take any type of public or medical transportation alone    Informed Patient of COVID Test Requirement Y    Confirmed Nurse will call to complete assessment Y    Additional comments:

## 2021-07-27 ENCOUNTER — LAB (OUTPATIENT)
Dept: LAB | Facility: CLINIC | Age: 21
End: 2021-07-27
Payer: COMMERCIAL

## 2021-07-27 DIAGNOSIS — Z11.59 ENCOUNTER FOR SCREENING FOR OTHER VIRAL DISEASES: ICD-10-CM

## 2021-07-27 PROCEDURE — U0005 INFEC AGEN DETEC AMPLI PROBE: HCPCS

## 2021-07-27 PROCEDURE — U0003 INFECTIOUS AGENT DETECTION BY NUCLEIC ACID (DNA OR RNA); SEVERE ACUTE RESPIRATORY SYNDROME CORONAVIRUS 2 (SARS-COV-2) (CORONAVIRUS DISEASE [COVID-19]), AMPLIFIED PROBE TECHNIQUE, MAKING USE OF HIGH THROUGHPUT TECHNOLOGIES AS DESCRIBED BY CMS-2020-01-R: HCPCS

## 2021-07-28 LAB — SARS-COV-2 RNA RESP QL NAA+PROBE: NEGATIVE

## 2021-07-30 ENCOUNTER — TRANSFERRED RECORDS (OUTPATIENT)
Dept: HEALTH INFORMATION MANAGEMENT | Facility: CLINIC | Age: 21
End: 2021-07-30

## 2021-07-30 ENCOUNTER — DOCUMENTATION ONLY (OUTPATIENT)
Dept: GASTROENTEROLOGY | Facility: OUTPATIENT CENTER | Age: 21
End: 2021-07-30
Payer: COMMERCIAL

## 2021-07-30 DIAGNOSIS — D13.91 FAP (FAMILIAL ADENOMATOUS POLYPOSIS): ICD-10-CM

## 2021-07-30 PROCEDURE — 88305 TISSUE EXAM BY PATHOLOGIST: CPT | Mod: TC,ORL | Performed by: COLON & RECTAL SURGERY

## 2021-07-30 PROCEDURE — 88305 TISSUE EXAM BY PATHOLOGIST: CPT | Mod: 26 | Performed by: PATHOLOGY

## 2021-08-02 ENCOUNTER — LAB REQUISITION (OUTPATIENT)
Dept: LAB | Facility: CLINIC | Age: 21
End: 2021-08-02
Payer: COMMERCIAL

## 2021-08-03 LAB
PATH REPORT.COMMENTS IMP SPEC: NORMAL
PATH REPORT.COMMENTS IMP SPEC: NORMAL
PATH REPORT.FINAL DX SPEC: NORMAL
PATH REPORT.GROSS SPEC: NORMAL
PATH REPORT.MICROSCOPIC SPEC OTHER STN: NORMAL
PATH REPORT.RELEVANT HX SPEC: NORMAL
PHOTO IMAGE: NORMAL

## 2021-09-19 ASSESSMENT — ENCOUNTER SYMPTOMS
SORE THROAT: 0
FEVER: 0
DYSURIA: 0
PARESTHESIAS: 0
CONSTIPATION: 0
JOINT SWELLING: 0
WEAKNESS: 0
ARTHRALGIAS: 0
DIZZINESS: 0
NERVOUS/ANXIOUS: 0
HEMATOCHEZIA: 0
MYALGIAS: 0
HEADACHES: 0
HEARTBURN: 0
NAUSEA: 0
BREAST MASS: 0
COUGH: 0
EYE PAIN: 0
ABDOMINAL PAIN: 0
HEMATURIA: 0
DIARRHEA: 0
CHILLS: 0
PALPITATIONS: 0
SHORTNESS OF BREATH: 0
FREQUENCY: 0

## 2021-09-20 ASSESSMENT — ENCOUNTER SYMPTOMS
MYALGIAS: 0
BREAST MASS: 0
EYE PAIN: 0
HEARTBURN: 0
COUGH: 0
DIZZINESS: 0
CHILLS: 0
NAUSEA: 0
DIARRHEA: 0
HEMATOCHEZIA: 0
DYSURIA: 0
PALPITATIONS: 0
WEAKNESS: 0
JOINT SWELLING: 0
FREQUENCY: 0
SORE THROAT: 0
NERVOUS/ANXIOUS: 0
CONSTIPATION: 0
SHORTNESS OF BREATH: 0
FEVER: 0
ARTHRALGIAS: 0
HEMATURIA: 0
PARESTHESIAS: 0
ABDOMINAL PAIN: 0
HEADACHES: 0

## 2021-09-20 NOTE — PROGRESS NOTES
SUBJECTIVE:   CC: Wally Varma is an 21 year old woman who presents for preventive health visit.       Patient has been advised of split billing requirements and indicates understanding: Yes  Healthy Habits:     Getting at least 3 servings of Calcium per day:  NO    Bi-annual eye exam:  NO    Dental care twice a year:  Yes    Sleep apnea or symptoms of sleep apnea:  None    Diet:  Other    Frequency of exercise:  None    Taking medications regularly:  Yes    Barriers to taking medications:  Problems remembering to take them    Medication side effects:  None    PHQ-2 Total Score: 0    Additional concerns today:  No        Pt thyroid and kidney function tests  Pt is wondering about getting a new inhaler prescription         Today's PHQ-2 Score:   PHQ-2 ( 1999 Pfizer) 9/19/2021   Q1: Little interest or pleasure in doing things 0   Q2: Feeling down, depressed or hopeless 0   PHQ-2 Score 0   Q1: Little interest or pleasure in doing things Not at all   Q2: Feeling down, depressed or hopeless Not at all   PHQ-2 Score 0       Abuse: Current or Past (Physical, Sexual or Emotional) - No  Do you feel safe in your environment? Yes    Have you ever done Advance Care Planning? (For example, a Health Directive, POLST, or a discussion with a medical provider or your loved ones about your wishes): No, advance care planning information given to patient to review.  Patient plans to discuss their wishes with loved ones or provider.      Social History     Tobacco Use     Smoking status: Never Smoker     Smokeless tobacco: Never Used   Substance Use Topics     Alcohol use: Yes     Comment: rare     If you drink alcohol do you typically have >3 drinks per day or >7 drinks per week? No    Alcohol Use 9/21/2021   Prescreen: >3 drinks/day or >7 drinks/week? -   Prescreen: >3 drinks/day or >7 drinks/week? No   No flowsheet data found.    Reviewed orders with patient.  Reviewed health maintenance and updated orders accordingly -  Yes  Lab work is in process  Labs reviewed in EPIC  BP Readings from Last 3 Encounters:   09/21/21 108/58   06/07/21 110/70   05/24/21 105/62    Wt Readings from Last 3 Encounters:   09/21/21 57.4 kg (126 lb 9.6 oz)   06/07/21 55.9 kg (123 lb 3.2 oz)   05/24/21 55.8 kg (123 lb)                    Breast Cancer Screening:        History of abnormal Pap smear: NO - age 21-29 PAP every 3 years recommended     Reviewed and updated as needed this visit by clinical staff  Tobacco  Allergies  Meds  Problems  Med Hx  Surg Hx  Fam Hx  Soc Hx          Reviewed and updated as needed this visit by Provider  Tobacco  Allergies  Meds  Problems  Med Hx  Surg Hx  Fam Hx         Here today for routine checkup.  We reviewed interval history including total colectomy for her familial polyposis.  Took a while but has recovered nicely and seems to be doing well.  Continues her follow-ups with her GI providers.  Is in need of some lab work for this.  Is to be very careful with what she eats.  Very low fiber.    Review of Systems   Constitutional: Negative for chills and fever.   HENT: Negative for congestion, ear pain, hearing loss and sore throat.    Eyes: Negative for pain and visual disturbance.   Respiratory: Negative for cough and shortness of breath.    Cardiovascular: Negative for chest pain, palpitations and peripheral edema.   Gastrointestinal: Negative for abdominal pain, constipation, diarrhea, heartburn, hematochezia and nausea.   Breasts:  Negative for tenderness, breast mass and discharge.   Genitourinary: Positive for vaginal discharge. Negative for dysuria, frequency, genital sores, hematuria, pelvic pain, urgency and vaginal bleeding.   Musculoskeletal: Negative for arthralgias, joint swelling and myalgias.   Skin: Negative for rash.   Neurological: Negative for dizziness, weakness, headaches and paresthesias.   Psychiatric/Behavioral: Negative for mood changes. The patient is not nervous/anxious.        "  OBJECTIVE:   /58   Pulse 83   Temp 98.3  F (36.8  C) (Tympanic)   Resp 14   Ht 1.6 m (5' 3\")   Wt 57.4 kg (126 lb 9.6 oz)   LMP 09/15/2021 (Exact Date)   SpO2 98%   BMI 22.43 kg/m    Physical Exam  GENERAL: healthy, alert and no distress  EYES: Eyes grossly normal to inspection, PERRL and conjunctivae and sclerae normal  HENT: ear canals and TM's normal, nose and mouth without ulcers or lesions  NECK: no adenopathy, no asymmetry, masses, or scars and thyroid normal to palpation  RESP: lungs clear to auscultation - no rales, rhonchi or wheezes  CV: regular rate and rhythm, normal S1 S2, no S3 or S4, no murmur, click or rub, no peripheral edema and peripheral pulses strong  ABDOMEN: soft, nontender, no hepatosplenomegaly, no masses and bowel sounds normal  MS: no gross musculoskeletal defects noted, no edema  SKIN: no suspicious lesions or rashes  NEURO: Normal strength and tone, mentation intact and speech normal  PSYCH: mentation appears normal, affect normal/bright    Diagnostic Test Results:  Labs reviewed in Epic    ASSESSMENT/PLAN:   (Z00.00) Routine general medical examination at a health care facility  (primary encounter diagnosis)  Comment: Routine health maintenance up-to-date otherwise.  Somewhat recently had Covid.  Discussed Covid vaccination post infection.  She plans to get this done relatively soon.  Plan: I also provided her some names of GYN providers to get set up with.    (D12.6) FAP (familial adenomatous polyposis)  Comment: Continues a relationship with GI  Plan: TSH with free T4 reflex, Basic metabolic panel,        Comprehensive metabolic panel            (R05) Cough  Comment: Not sure if she has been diagnosed with asthma but she has typically had an inhaler on hand.  So we discussed intermittent asthma.  Plan: albuterol (PROAIR HFA/PROVENTIL HFA/VENTOLIN         HFA) 108 (90 Base) MCG/ACT inhaler              Patient has been advised of split billing requirements and indicates " "understanding: Yes  COUNSELING:  Reviewed preventive health counseling, as reflected in patient instructions       Regular exercise       Healthy diet/nutrition       Vision screening    Estimated body mass index is 22.43 kg/m  as calculated from the following:    Height as of this encounter: 1.6 m (5' 3\").    Weight as of this encounter: 57.4 kg (126 lb 9.6 oz).        She reports that she has never smoked. She has never used smokeless tobacco.      Counseling Resources:  ATP IV Guidelines  Pooled Cohorts Equation Calculator  Breast Cancer Risk Calculator  BRCA-Related Cancer Risk Assessment: FHS-7 Tool  FRAX Risk Assessment  ICSI Preventive Guidelines  Dietary Guidelines for Americans, 2010  USDA's MyPlate  ASA Prophylaxis  Lung CA Screening    Courtney Jenkins MD  [facility]  "

## 2021-09-21 ENCOUNTER — OFFICE VISIT (OUTPATIENT)
Dept: FAMILY MEDICINE | Facility: CLINIC | Age: 21
End: 2021-09-21
Payer: COMMERCIAL

## 2021-09-21 VITALS
HEART RATE: 83 BPM | BODY MASS INDEX: 22.43 KG/M2 | DIASTOLIC BLOOD PRESSURE: 58 MMHG | RESPIRATION RATE: 14 BRPM | HEIGHT: 63 IN | TEMPERATURE: 98.3 F | OXYGEN SATURATION: 98 % | SYSTOLIC BLOOD PRESSURE: 108 MMHG | WEIGHT: 126.6 LBS

## 2021-09-21 DIAGNOSIS — R05.9 COUGH: ICD-10-CM

## 2021-09-21 DIAGNOSIS — Z00.00 ROUTINE GENERAL MEDICAL EXAMINATION AT A HEALTH CARE FACILITY: Primary | ICD-10-CM

## 2021-09-21 DIAGNOSIS — D13.91 FAP (FAMILIAL ADENOMATOUS POLYPOSIS): ICD-10-CM

## 2021-09-21 DIAGNOSIS — Z86.16 HISTORY OF 2019 NOVEL CORONAVIRUS DISEASE (COVID-19): ICD-10-CM

## 2021-09-21 LAB
ALBUMIN SERPL-MCNC: 3.9 G/DL (ref 3.4–5)
ALP SERPL-CCNC: 86 U/L (ref 40–150)
ALT SERPL W P-5'-P-CCNC: 37 U/L (ref 0–50)
ANION GAP SERPL CALCULATED.3IONS-SCNC: 3 MMOL/L (ref 3–14)
AST SERPL W P-5'-P-CCNC: 23 U/L (ref 0–45)
BILIRUB SERPL-MCNC: 0.5 MG/DL (ref 0.2–1.3)
BUN SERPL-MCNC: 12 MG/DL (ref 7–30)
CALCIUM SERPL-MCNC: 8.9 MG/DL (ref 8.5–10.1)
CHLORIDE BLD-SCNC: 108 MMOL/L (ref 94–109)
CO2 SERPL-SCNC: 28 MMOL/L (ref 20–32)
CREAT SERPL-MCNC: 0.55 MG/DL (ref 0.52–1.04)
GFR SERPL CREATININE-BSD FRML MDRD: >90 ML/MIN/1.73M2
GLUCOSE BLD-MCNC: 95 MG/DL (ref 70–99)
POTASSIUM BLD-SCNC: 4.1 MMOL/L (ref 3.4–5.3)
PROT SERPL-MCNC: 7.4 G/DL (ref 6.8–8.8)
SODIUM SERPL-SCNC: 139 MMOL/L (ref 133–144)
TSH SERPL DL<=0.005 MIU/L-ACNC: 2.16 MU/L (ref 0.4–4)

## 2021-09-21 PROCEDURE — 36415 COLL VENOUS BLD VENIPUNCTURE: CPT | Performed by: FAMILY MEDICINE

## 2021-09-21 PROCEDURE — 99395 PREV VISIT EST AGE 18-39: CPT | Performed by: FAMILY MEDICINE

## 2021-09-21 PROCEDURE — 84443 ASSAY THYROID STIM HORMONE: CPT | Performed by: FAMILY MEDICINE

## 2021-09-21 PROCEDURE — 80053 COMPREHEN METABOLIC PANEL: CPT | Performed by: FAMILY MEDICINE

## 2021-09-21 RX ORDER — ALBUTEROL SULFATE 90 UG/1
2 AEROSOL, METERED RESPIRATORY (INHALATION) EVERY 4 HOURS PRN
Qty: 8.5 G | Refills: 2 | Status: SHIPPED | OUTPATIENT
Start: 2021-09-21

## 2021-09-21 ASSESSMENT — MIFFLIN-ST. JEOR: SCORE: 1298.38

## 2021-09-22 NOTE — RESULT ENCOUNTER NOTE
Justa,  Your lab work looks great, actually.  Thyroid and calcium totally normal.  RAKEL Jenkins M.D.

## 2021-10-10 ENCOUNTER — HEALTH MAINTENANCE LETTER (OUTPATIENT)
Age: 21
End: 2021-10-10

## 2022-01-31 ENCOUNTER — OFFICE VISIT (OUTPATIENT)
Dept: FAMILY MEDICINE | Facility: CLINIC | Age: 22
End: 2022-01-31
Payer: COMMERCIAL

## 2022-01-31 VITALS
OXYGEN SATURATION: 99 % | BODY MASS INDEX: 23.24 KG/M2 | HEART RATE: 85 BPM | RESPIRATION RATE: 18 BRPM | SYSTOLIC BLOOD PRESSURE: 115 MMHG | DIASTOLIC BLOOD PRESSURE: 73 MMHG | WEIGHT: 131.2 LBS | TEMPERATURE: 98.6 F

## 2022-01-31 DIAGNOSIS — H60.332 ACUTE SWIMMER'S EAR OF LEFT SIDE: Primary | ICD-10-CM

## 2022-01-31 PROCEDURE — 99213 OFFICE O/P EST LOW 20 MIN: CPT | Performed by: FAMILY MEDICINE

## 2022-01-31 RX ORDER — NEOMYCIN SULFATE, POLYMYXIN B SULFATE AND HYDROCORTISONE 10; 3.5; 1 MG/ML; MG/ML; [USP'U]/ML
3 SUSPENSION/ DROPS AURICULAR (OTIC) 4 TIMES DAILY
Qty: 10 ML | Refills: 0 | Status: SHIPPED | OUTPATIENT
Start: 2022-01-31 | End: 2022-04-18

## 2022-03-17 ENCOUNTER — VIRTUAL VISIT (OUTPATIENT)
Dept: GASTROENTEROLOGY | Facility: CLINIC | Age: 22
End: 2022-03-17
Payer: COMMERCIAL

## 2022-03-17 DIAGNOSIS — Z71.3 NUTRITIONAL COUNSELING: ICD-10-CM

## 2022-03-17 DIAGNOSIS — D13.91 FAP (FAMILIAL ADENOMATOUS POLYPOSIS): Primary | ICD-10-CM

## 2022-03-17 PROCEDURE — 97803 MED NUTRITION INDIV SUBSEQ: CPT | Mod: 95 | Performed by: DIETITIAN, REGISTERED

## 2022-03-17 NOTE — NURSING NOTE
Patient verified meds and allergies are correct via patients echeck-in.    Mali Land, Virtual Facilitator

## 2022-03-17 NOTE — PROGRESS NOTES
Justa is a 21 year old who is being evaluated via a billable video visit.      How would you like to obtain your AVS? MyChart  If the video visit is dropped, the invitation should be resent by: Text to cell phone: [phone]  Will anyone else be joining your video visit? No    Video Start Time: 2:31 PM  Video-Visit Details    Type of service:  Video Visit    Video End Time:3:13 PM    Originating Location (pt. Location): Home    Distant Location (provider location):  [facility] [place]     Platform used for Video Visit: [employer]     Wally Varma 21 year old female who is being evaluated via a billable video visit.      [facility] Outpatient Medical Nutrition Therapy      Time Spent:  42 minutes  Session Type:  Follow-up visit  Referring Physician:  Wade Maguire MD  Reason for RD Visit:  Nutritional counseling, s/p ileorectal anastomosis.    Nutrition Assessment:  Patient is a 22 year old female with history that includes familiar adenomatous polyp syndrome. Pt now s/p ileorectal anastomosis, partial omentectomy, takedown of splenic fixture and flex sigmoidectomy on 4/22/21 with Dr. Maguire.     She stated that she has continued to follow a lower fiber/moderate fiber diet since her surgery due to wanting to be careful. She feels like she is eating a lot of starchy carbohydrate foods though and her usual preference is a lot of fresh fruit and salads. She would like to eat better and getting bored with the same foods. Overall she has been feeling well, but since [place], she has had 3 bouts of sharp stomach pain. The last stomachache, she was able to sleep without waking and then woke up feeling fine. She continues to frequently have looser stools, sometimes watery stool. Other times will have some solid stools.  If eats meat does have to go to the bathroom right away. She has been eating some plant-based meat substitutes and tolerating without issues. Over the summer she  "ate an iceberg lettuce salad but did not tolerate it so continues to avoid salads. She tried a spinach salad without stems on the leaves and this was tolerated without issues.she loves asparagus so tried eating it a couple of months ago and ate the whole stalks but did not tolerate at all. She continue to mostly avoid/limit caffeine and only gets an occasional small [employer] refresher drink. She will drink a caffeine free carbonated soda ~2-3 times per week (root beer, sprite or orange soda). She wonders if bread is tougher for her to digest but eats pasta regularly and tolerates this well. She tends to eat more pasta dishes for her meal at work because it is tolerated and she does not want to have an issue while working. In the summer she loves to garden and eat fresh garden vegetables as well so she is hoping she can expand her diet. She also reported that she needs to schedule a follow up with Dr. Maguire but wasn't sure where to call to schedule. Writer will message RN to help get her scheduled.    Height:   Ht Readings from Last 1 Encounters:   09/21/21 1.6 m (5' 3\")     Weight:    Wt Readings from Last 10 Encounters:   01/31/22 59.5 kg (131 lb 3.2 oz)   09/21/21 57.4 kg (126 lb 9.6 oz)   06/07/21 55.9 kg (123 lb 3.2 oz)   05/24/21 55.8 kg (123 lb)   05/06/21 56.7 kg (125 lb)   04/22/21 60.1 kg (132 lb 7.9 oz)   04/12/21 61 kg (134 lb 6.4 oz)   04/12/21 60.6 kg (133 lb 8 oz)   02/08/21 54.4 kg (120 lb)   08/10/20 54.4 kg (120 lb)     BMI:   Estimated body mass index is 23.24 kg/m  as calculated from the following:    Height as of 9/21/21: 1.6 m (5' 3\").    Weight as of 1/31/22: 59.5 kg (131 lb 3.2 oz).    Diet Recall:  (some usual recent meals):  Meal Food    Breakfast [employer] eggs, sausage cheese sandwich on way to work or bagel with cream cheese and sometimes with also with HB eggs   Lunch Pasta/noodles with broccoli and tomatoes and green beans and chelly sauce or [employer] mac and cheese or sub " sandwich (cheese, ham and small amt veggies)   Dinner At work: pasta with vegs, chelly or burger or pizza or veggie carlotta   Snacks AM: cheez its or triscuits with slice cheese and at work chips or crackers or a cookie. occas at night: ice cream bar or fun size candy bar(salted nut rolls or almond aydee) or PB crackers.   Beverages 33 oz-48 oz Water, about 2-3 sugar free root beer or occasional small [employer] refresher. occas gatorade.Once in a while skim lactose free [facility] milk          Labs:    Last Comprehensive Metabolic Panel:  Sodium   Date Value Ref Range Status   09/21/2021 139 133 - 144 mmol/L Final   04/12/2021 137 133 - 144 mmol/L Final     Potassium   Date Value Ref Range Status   09/21/2021 4.1 3.4 - 5.3 mmol/L Final   04/22/2021 3.9 3.4 - 5.3 mmol/L Final     Chloride   Date Value Ref Range Status   09/21/2021 108 94 - 109 mmol/L Final   04/12/2021 106 94 - 109 mmol/L Final     Carbon Dioxide   Date Value Ref Range Status   04/12/2021 28 20 - 32 mmol/L Final     Carbon Dioxide (CO2)   Date Value Ref Range Status   09/21/2021 28 20 - 32 mmol/L Final     Anion Gap   Date Value Ref Range Status   09/21/2021 3 3 - 14 mmol/L Final   04/12/2021 3 3 - 14 mmol/L Final     Glucose   Date Value Ref Range Status   09/21/2021 95 70 - 99 mg/dL Final   04/12/2021 81 70 - 99 mg/dL Final     Urea Nitrogen   Date Value Ref Range Status   09/21/2021 12 7 - 30 mg/dL Final   04/12/2021 11 7 - 30 mg/dL Final     Creatinine   Date Value Ref Range Status   09/21/2021 0.55 0.52 - 1.04 mg/dL Final   04/12/2021 0.57 0.52 - 1.04 mg/dL Final     GFR Estimate   Date Value Ref Range Status   09/21/2021 >90 >60 mL/min/1.73m2 Final     Comment:     As of July 11, 2021, eGFR is calculated by the CKD-EPI creatinine equation, without race adjustment. eGFR can be influenced by muscle mass, exercise, and diet. The reported eGFR is an estimation only and is only applicable if the renal function is stable.   04/12/2021 >90 >60  mL/min/[1.73_m2] Final     Comment:     Non  GFR Calc  Starting 12/18/2018, serum creatinine based estimated GFR (eGFR) will be   calculated using the Chronic Kidney Disease Epidemiology Collaboration   (CKD-EPI) equation.       Calcium   Date Value Ref Range Status   09/21/2021 8.9 8.5 - 10.1 mg/dL Final   04/12/2021 9.0 8.5 - 10.1 mg/dL Final       CBC RESULTS: No results for input(s): WBC, RBC, HGB, HCT, MCV, MCH, MCHC, RDW, PLT in the last [id number] hours.    Pertinent Medications/vitamin and mineral supplements:    Current Outpatient Medications   Medication     albuterol (PROAIR HFA/PROVENTIL HFA/VENTOLIN HFA) 108 (90 Base) MCG/ACT inhaler     loperamide (IMODIUM) 2 MG capsule     neomycin-polymyxin-hydrocortisone (CORTISPORIN) 3.5-97615-2 otic suspension     sulindac (CLINORIL) 150 MG tablet     No current facility-administered medications for this visit.     Food Allergies:  NKFA  Physical activity: in nicer weather now just re-starting to go dog walking and hoping to go back to her gym soon too.    MALNUTRITION:  % Weight Loss:  No significant weight loss  % Intake:  No Decreased intake    Subcutaneous Fat Loss:  None observed  Muscle Loss:  None observed  Fluid Retention:  None noted    Malnutrition Diagnosis: Patient does not meet two of the above criteria necessary for diagnosing malnutrition  Chronic illness or disease    Nutrition Diagnosis:      Previous and Current: Food and nutrition related knowledge deficit related to lack of complete recall of previous diet education/interested in review of previous diet education as evidenced by pt report and interest in diet education review and with questions.- ongoing    Nutrition Prescription: okay for general healthful diet (moderate to normal fiber okay) and adequate fluids, smaller more frequent meals likely better tolerated.    Nutrition Intervention:    Nutrition Education/Counseling:  Provided diet education review for gradually increasing  diet to a regular one. Gave pt tips and suggestions for increasing intake and variety based on her food preferences.  Discussed adequate hydration and recommended drinking at least 48-64 oz fluids per day. Discussed some foods that may help with looser stools including some foods with soluble fiber and limiting added sugars. Recommended keeping symptoms food and beverage journals to help her see if there are some foods/beverage causing more symptoms/looser stools for her vs other that may be better tolerated. Explained that journals will also be helpful as she tries to increase and liberalize her diet more. Answered patient questions.    Educational materials: [employer] Daily Nutrition handout (Plate method handout).    Goals:    1. Okay to follow a regular general healthy diet.  --gradually re-introduce more foods/fiber containing foods back into your diet.   --since you like asparagus, can try having just the tips cooked and don't eat the stalk.  --Can re-try having a side salad (such as mixing jhony lettuce with some spinach to see if this is tolerated)    2. Eating smaller meals spread out throughout the day may be better tolerated such as eating 4-6 smaller meals per day (or 3 smaller meals and 2-3 snacks)    3. Could use the Plate method meal plan for general guidance on getting balanced meals and following a regular diet  --Can use a smaller size plate such as salad size  --Make half of your plate fruits and vegetables  --Make 1/4 of your plate lean protein sources  --Make the other 1/4 of your plate starches/grains (which includes whole grain starches, pasta, bread, potatoes, sweet potatoes, peas as some examples).  --Include some healthy fats at a meal such as cooking with olive oil, or having some avocado, nuts or nut butter at a meal.    4. Some foods with some soluble fiber may be helpful in giving stools more form. Some foods that are good sources of soluble fiber include pears, apples, oranges,  "berries, avocado, sweet potato, potato, oats and oatmeal, ground flaxseed, carrots, navy beans, kidney beans, black beans, nuts.    5. Keep daily food and beverage journals and can track any symptoms you experience (if you experience any symptoms).  --Can use a symptom tracking cesia such as \"My Symptoms Tracker\" or \"Courtney [facility]\" cesia.    6. Drink at least 48 oz-64 oz fluids such as water, gatorade, [employer] lowfat/skim milk per day.    7. Chew food well before swallowing.    Nutrition Monitoring and Evaluation: Will monitor adherence to nutrition recommendations at any future RD visits.     Further Medical Nutrition Therapy:  Can follow-up in the next 1-2 months or as needed.    Patient was encouraged to call/contact RD with any further questions.    Shirley Grace MS, RD, LD  "

## 2022-03-17 NOTE — LETTER
3/17/2022         RE: Wally Varma  [address]  [place] [address]        Dear Colleague,    Thank you for referring your patient, Wally Varma, to the [facility] [place]. Please see a copy of my visit note below.    [facility] Outpatient Medical Nutrition Therapy      Time Spent:  42 minutes  Session Type:  Follow-up visit  Referring Physician:  Wade Maguire MD  Reason for RD Visit:  Nutritional counseling, s/p ileorectal anastomosis.    Nutrition Assessment:  Patient is a 22 year old female with history that includes familiar adenomatous polyp syndrome. Pt now s/p ileorectal anastomosis, partial omentectomy, takedown of splenic fixture and flex sigmoidectomy on 4/22/21 with Dr. Maguire.     She stated that she has continued to follow a lower fiber/moderate fiber diet since her surgery due to wanting to be careful. She feels like she is eating a lot of starchy carbohydrate foods though and her usual preference is a lot of fresh fruit and salads. She would like to eat better and getting bored with the same foods. Overall she has been feeling well, but since [place], she has had 3 bouts of sharp stomach pain. The last stomachache, she was able to sleep without waking and then woke up feeling fine. She continues to frequently have looser stools, sometimes watery stool. Other times will have some solid stools.  If eats meat does have to go to the bathroom right away. She has been eating some plant-based meat substitutes and tolerating without issues. Over the summer she ate an iceberg lettuce salad but did not tolerate it so continues to avoid salads. She tried a spinach salad without stems on the leaves and this was tolerated without issues.she loves asparagus so tried eating it a couple of months ago and ate the whole stalks but did not tolerate at all. She continue to mostly avoid/limit caffeine and only gets an occasional small [employer]  "refresher drink. She will drink a caffeine free carbonated soda ~2-3 times per week (root beer, sprite or orange soda). She wonders if bread is tougher for her to digest but eats pasta regularly and tolerates this well. She tends to eat more pasta dishes for her meal at work because it is tolerated and she does not want to have an issue while working. In the summer she loves to garden and eat fresh garden vegetables as well so she is hoping she can expand her diet. She also reported that she needs to schedule a follow up with Dr. Maguire but wasn't sure where to call to schedule. Writer will message RN to help get her scheduled.    Height:   Ht Readings from Last 1 Encounters:   09/21/21 1.6 m (5' 3\")     Weight:    Wt Readings from Last 10 Encounters:   01/31/22 59.5 kg (131 lb 3.2 oz)   09/21/21 57.4 kg (126 lb 9.6 oz)   06/07/21 55.9 kg (123 lb 3.2 oz)   05/24/21 55.8 kg (123 lb)   05/06/21 56.7 kg (125 lb)   04/22/21 60.1 kg (132 lb 7.9 oz)   04/12/21 61 kg (134 lb 6.4 oz)   04/12/21 60.6 kg (133 lb 8 oz)   02/08/21 54.4 kg (120 lb)   08/10/20 54.4 kg (120 lb)     BMI:   Estimated body mass index is 23.24 kg/m  as calculated from the following:    Height as of 9/21/21: 1.6 m (5' 3\").    Weight as of 1/31/22: 59.5 kg (131 lb 3.2 oz).    Diet Recall:  (some usual recent meals):  Meal Food    Breakfast [place] eggs, sausage cheese sandwich on way to work or bagel with cream cheese and sometimes with also with HB eggs   Lunch Pasta/noodles with broccoli and tomatoes and green beans and chelly sauce or [employer] mac and cheese or sub sandwich (cheese, ham and small amt veggies)   Dinner At work: pasta with vegs, chelly or burger or pizza or veggie carlotta   Snacks AM: cheez its or triscuits with slice cheese and at work chips or crackers or a cookie. occas at night: ice cream bar or fun size candy bar(salted nut rolls or almond aydee) or PB crackers.   Beverages 33 oz-48 oz Water, about 2-3 sugar free root beer or " occasional small starbuck's refresher. yolanda ruiz.Once in a while skim lactose free [facility] milk          Labs:    Last Comprehensive Metabolic Panel:  Sodium   Date Value Ref Range Status   09/21/2021 139 133 - 144 mmol/L Final   04/12/2021 137 133 - 144 mmol/L Final     Potassium   Date Value Ref Range Status   09/21/2021 4.1 3.4 - 5.3 mmol/L Final   04/22/2021 3.9 3.4 - 5.3 mmol/L Final     Chloride   Date Value Ref Range Status   09/21/2021 108 94 - 109 mmol/L Final   04/12/2021 106 94 - 109 mmol/L Final     Carbon Dioxide   Date Value Ref Range Status   04/12/2021 28 20 - 32 mmol/L Final     Carbon Dioxide (CO2)   Date Value Ref Range Status   09/21/2021 28 20 - 32 mmol/L Final     Anion Gap   Date Value Ref Range Status   09/21/2021 3 3 - 14 mmol/L Final   04/12/2021 3 3 - 14 mmol/L Final     Glucose   Date Value Ref Range Status   09/21/2021 95 70 - 99 mg/dL Final   04/12/2021 81 70 - 99 mg/dL Final     Urea Nitrogen   Date Value Ref Range Status   09/21/2021 12 7 - 30 mg/dL Final   04/12/2021 11 7 - 30 mg/dL Final     Creatinine   Date Value Ref Range Status   09/21/2021 0.55 0.52 - 1.04 mg/dL Final   04/12/2021 0.57 0.52 - 1.04 mg/dL Final     GFR Estimate   Date Value Ref Range Status   09/21/2021 >90 >60 mL/min/1.73m2 Final     Comment:     As of July 11, 2021, eGFR is calculated by the CKD-EPI creatinine equation, without race adjustment. eGFR can be influenced by muscle mass, exercise, and diet. The reported eGFR is an estimation only and is only applicable if the renal function is stable.   04/12/2021 >90 >60 mL/min/[1.73_m2] Final     Comment:     Non  GFR Calc  Starting 12/18/2018, serum creatinine based estimated GFR (eGFR) will be   calculated using the Chronic Kidney Disease Epidemiology Collaboration   (CKD-EPI) equation.       Calcium   Date Value Ref Range Status   09/21/2021 8.9 8.5 - 10.1 mg/dL Final   04/12/2021 9.0 8.5 - 10.1 mg/dL Final       CBC RESULTS: No results  for input(s): WBC, RBC, HGB, HCT, MCV, MCH, MCHC, RDW, PLT in the last 50046 hours.    Pertinent Medications/vitamin and mineral supplements:    Current Outpatient Medications   Medication     albuterol (PROAIR HFA/PROVENTIL HFA/VENTOLIN HFA) 108 (90 Base) MCG/ACT inhaler     loperamide (IMODIUM) 2 MG capsule     neomycin-polymyxin-hydrocortisone (CORTISPORIN) 3.5-15652-6 otic suspension     sulindac (CLINORIL) 150 MG tablet     No current facility-administered medications for this visit.     Food Allergies:  NKFA  Physical activity: in nicer weather now just re-starting to go dog walking and hoping to go back to her gym soon too.    MALNUTRITION:  % Weight Loss:  No significant weight loss  % Intake:  No Decreased intake    Subcutaneous Fat Loss:  None observed  Muscle Loss:  None observed  Fluid Retention:  None noted    Malnutrition Diagnosis: Patient does not meet two of the above criteria necessary for diagnosing malnutrition  Chronic illness or disease    Nutrition Diagnosis:      Previous and Current: Food and nutrition related knowledge deficit related to lack of complete recall of previous diet education/interested in review of previous diet education as evidenced by pt report and interest in diet education review and with questions.- ongoing    Nutrition Prescription: okay for general healthful diet (moderate to normal fiber okay) and adequate fluids, smaller more frequent meals likely better tolerated.    Nutrition Intervention:    Nutrition Education/Counseling:  Provided diet education review for gradually increasing diet to a regular one. Gave pt tips and suggestions for increasing intake and variety based on her food preferences.  Discussed adequate hydration and recommended drinking at least 48-64 oz fluids per day. Discussed some foods that may help with looser stools including some foods with soluble fiber and limiting added sugars. Recommended keeping symptoms food and beverage journals to help  "her see if there are some foods/beverage causing more symptoms/looser stools for her vs other that may be better tolerated. Explained that journals will also be helpful as she tries to increase and liberalize her diet more. Answered patient questions.    Educational materials: [facility] Daily Nutrition handout (Plate method handout).    Goals:    1. Okay to follow a regular general healthy diet.  --gradually re-introduce more foods/fiber containing foods back into your diet.   --since you like asparagus, can try having just the tips cooked and don't eat the stalk.  --Can re-try having a side salad (such as mixing jhony lettuce with some spinach to see if this is tolerated)    2. Eating smaller meals spread out throughout the day may be better tolerated such as eating 4-6 smaller meals per day (or 3 smaller meals and 2-3 snacks)    3. Could use the Plate method meal plan for general guidance on getting balanced meals and following a regular diet  --Can use a smaller size plate such as salad size  --Make half of your plate fruits and vegetables  --Make 1/4 of your plate lean protein sources  --Make the other 1/4 of your plate starches/grains (which includes whole grain starches, pasta, bread, potatoes, sweet potatoes, peas as some examples).  --Include some healthy fats at a meal such as cooking with olive oil, or having some avocado, nuts or nut butter at a meal.    4. Some foods with some soluble fiber may be helpful in giving stools more form. Some foods that are good sources of soluble fiber include pears, apples, oranges, berries, avocado, sweet potato, potato, oats and oatmeal, ground flaxseed, carrots, navy beans, kidney beans, black beans, nuts.    5. Keep daily food and beverage journals and can track any symptoms you experience (if you experience any symptoms).  --Can use a symptom tracking cesia such as \"My Symptoms Tracker\" or \"Courtney Care\" cesia.    6. Drink at least 48 oz-64 oz fluids such as water, gatorade, " Fairlife lowfat/skim milk per day.    7. Chew food well before swallowing.    Nutrition Monitoring and Evaluation: Will monitor adherence to nutrition recommendations at any future RD visits.     Further Medical Nutrition Therapy:  Can follow-up in the next 1-2 months or as needed.    Patient was encouraged to call/contact RD with any further questions.      Shirley Grace, MS, RD, LD

## 2022-03-17 NOTE — PATIENT INSTRUCTIONS
"It was nice speaking with you today. Below are the nutrition recommendations we discussed at your visit.    Please let me know if you have any additional questions.    Nutrition Recommendations    1. Okay to follow a regular general healthy diet.  --gradually re-introduce more foods/fiber containing foods back into your diet.   --since you like asparagus, can try having just the tips cooked and don't eat the stalk.  --Can re-try having a side salad (such as mixing jhony lettuce with some spinach to see if this is tolerated)    2. Eating smaller meals spread out throughout the day may be better tolerated such as eating 4-6 smaller meals per day (or 3 smaller meals and 2-3 snacks)    3. Could use the Plate method meal plan for general guidance on getting balanced meals and following a regular diet  --Can use a smaller size plate such as salad size  --Make half of your plate fruits and vegetables  --Make 1/4 of your plate lean protein sources  --Make the other 1/4 of your plate starches/grains (which includes whole grain starches, pasta, bread, potatoes, sweet potatoes, peas as some examples).  --Include some healthy fats at a meal such as cooking with olive oil, or having some avocado, nuts or nut butter at a meal.    4. Some foods with some soluble fiber may be helpful in giving stools more form. Some foods that are good sources of soluble fiber include pears, apples, oranges, berries, avocado, sweet potato, potato, oats and oatmeal, ground flaxseed, carrots, navy beans, kidney beans, black beans, nuts.    5. Keep daily food and beverage journals and can track any symptoms you experience (if you experience any symptoms).  --Can use a symptom tracking cesia such as \"My Symptoms Tracker\" or \"Courtney Care\" cesia.    6. Drink at least 48 oz-64 oz fluids such as water, gatorade, Fairlife lowfat/skim milk per day.    7. Chew food well before swallowing.    Can follow-up in the next 1-2 months or as needed.    If you would like " to schedule a follow up appointment, please call [phone].    Shirley Grace MS, RD, LD

## 2022-03-18 ENCOUNTER — TELEPHONE (OUTPATIENT)
Dept: SURGERY | Facility: CLINIC | Age: 22
End: 2022-03-18
Payer: COMMERCIAL

## 2022-03-18 NOTE — TELEPHONE ENCOUNTER
GREGOR and sent [facility]etic[facility] for scheduling f/u with Dr. Maguire per pt request. [facility] Return visit next available.

## 2022-03-24 ENCOUNTER — TELEPHONE (OUTPATIENT)
Dept: FAMILY MEDICINE | Facility: CLINIC | Age: 22
End: 2022-03-24
Payer: COMMERCIAL

## 2022-03-24 NOTE — TELEPHONE ENCOUNTER
Patient was seen for Acute Swimmer's ear of left side on 1/31/22 and was to return in about 1 week or around 2/7/22, if not improving.    Called patient and left message asking if she would be willing to see a different provider to examine her ear, also to call the clinic back to discuss her symptoms.    If patient calls back, please direct call to the [facility] RN team.      Lorri Luevano RN, [facility]

## 2022-03-24 NOTE — TELEPHONE ENCOUNTER
Reason for Call:  Other returning call    Detailed comments: patient says that she is okay with staying with Dr Jenkins and doesn't mind the wait. [facility]    Phone Number Patient can be reached at: Home number on file [phone] (home)    Best Time: anytime    Can we leave a detailed message on this number? YES    Call taken on 3/24/2022 at 2:02 PM by Mariela Sharp

## 2022-03-24 NOTE — TELEPHONE ENCOUNTER
Reason for Call:  Other appointment and call back    Detailed comments: Patient called about scheduling a follow up appointment for continued left ear pain and vertigo concerns request a sooner appointment and to be placed on wait list as well.    Phone Number Patient can be reached at: Cell number on file:    Telephone Information:   Mobile [phone]       Best Time: anytime    Can we leave a detailed message on this number? YES    Call taken on 3/24/2022 at 9:27 AM by Darrel Puentes MA

## 2022-04-18 ENCOUNTER — OFFICE VISIT (OUTPATIENT)
Dept: FAMILY MEDICINE | Facility: CLINIC | Age: 22
End: 2022-04-18
Payer: COMMERCIAL

## 2022-04-18 VITALS
WEIGHT: 133.7 LBS | SYSTOLIC BLOOD PRESSURE: 109 MMHG | BODY MASS INDEX: 22.28 KG/M2 | HEART RATE: 75 BPM | TEMPERATURE: 97.7 F | RESPIRATION RATE: 18 BRPM | HEIGHT: 65 IN | OXYGEN SATURATION: 98 % | DIASTOLIC BLOOD PRESSURE: 80 MMHG

## 2022-04-18 DIAGNOSIS — H60.332 ACUTE SWIMMER'S EAR OF LEFT SIDE: Primary | ICD-10-CM

## 2022-04-18 DIAGNOSIS — R42 DIZZINESS: ICD-10-CM

## 2022-04-18 PROCEDURE — 99214 OFFICE O/P EST MOD 30 MIN: CPT | Performed by: FAMILY MEDICINE

## 2022-04-18 RX ORDER — CEFUROXIME AXETIL 500 MG/1
500 TABLET ORAL 2 TIMES DAILY
Qty: 14 TABLET | Refills: 0 | Status: SHIPPED | OUTPATIENT
Start: 2022-04-18 | End: 2022-04-25

## 2022-04-18 ASSESSMENT — PAIN SCALES - GENERAL: PAINLEVEL: NO PAIN (0)

## 2022-04-18 NOTE — PROGRESS NOTES
Assessment & Plan     Acute swimmer's ear of left side  We first discussed this issue about 3 months ago.  And whitish matter in her left ear canal.  Cortisporin otic helped for a couple of days but since then she continues to have a white drainage and a sense of pressure.  She also senses some dizziness and she is not sure if it is related to the ear or not.  Upon examination she has copious white matter in her canal.  Not a lot of erythema or swelling of the canal and from what I can see of the tympanic membrane it appears to be normal.  So she continues to have an otitis externa, possibly Pseudomonas.  We will treat topically with Cipro otic and systemically with Ceftin.  Looking for resolution during the course of the week.  If not I would like to get her into ENT for more thorough examination.  - ciprofloxacin-hydrocortisone (CIPRO HC OTIC) 0.2-1 % otic suspension; Place 3 drops into both ears 2 times daily for 7 days  - cefuroxime (CEFTIN) 500 MG tablet; Take 1 tablet (500 mg) by mouth 2 times daily for 7 days  - Otolaryngology Referral; Future    Dizziness  As above.  Likely related to the ear issues.         See Patient Instructions    Return in about 1 week (around 4/25/2022) for Contact me with progress, B[place]le message.    Courtney Jenkins MD  [facility]    James Marr is a 21 year old who presents for the following health issues     History of Present Illness       Reason for visit:  Ear ache and dizziness  Symptom onset:  3-4 weeks ago  Symptom intensity:  Moderate  Symptom progression:  Staying the same  Had these symptoms before:  No    She eats 2-3 servings of fruits and vegetables daily.She consumes 1 sweetened beverage(s) daily.She exercises with enough effort to increase her heart rate 30 to 60 minutes per day.  She exercises with enough effort to increase her heart rate 5 days per week. She is missing 1 dose(s) of medications per week.         Here today in  "follow-up of left ear issues as above.    Review of Systems   Constitutional, HEENT, cardiovascular, pulmonary, gi and gu systems are negative, except as otherwise noted.      Objective    /80   Pulse 75   Temp 97.7  F (36.5  C) (Temporal)   Resp 18   Ht 1.641 m (5' 4.6\")   Wt 60.6 kg (133 lb 11.2 oz)   LMP 03/20/2022   SpO2 98%   BMI 22.53 kg/m    Body mass index is 22.53 kg/m .  Physical Exam   Alert, pleasant, upbeat, and in no apparent discomfort.  Left ear canal is 50% occluded by a whitish purulent matter.  Not a lot of erythema or swelling of the canal.  I can see about half of the tympanic membrane and it appears normal.  No cervical lymphadenopathy  S1 and S2 normal, no murmurs, clicks, gallops or rubs. Regular rate and rhythm. Chest is clear; no wheezes or rales. No edema or JVD.                 "

## 2022-05-04 NOTE — PROGRESS NOTES
Patient presents for suture removal. The wound is well healed without signs of infection.  The sutures are removed. Return prn.    Wally Varma presents to the clinic for removal of sutures. The patient has had sutures in place for 11 days. There has been no patient reported signs or symptoms of infection or drainage. 5  sutures are seen and located on the left hand. All sutures were easily removed today. Routine wound care discussed by the RN or provider. The patient will follow up as needed.      Steri strips were applied to the laceration site on the left hand and benzoin application was applied as well. Patient was told to keep her hand dry and to keep the steri strips on for an additional 10 days. Internally healed but superficially needs to continue to heal.     Patient will call into the clinic with any questions or concerns or come in for a nurse visit.     Sandrita Rinaldi RN    
56.6

## 2022-06-06 NOTE — PROGRESS NOTES
"Colon and Rectal Surgery Follow-up Clinic Note      RE: Wally Varma  : 2000  DIMAS: 76/10/22    DIAGNOSIS: 22 year old female with attenuated familial adenomatous polyposis who is now status post laparoscopic total abdominal colectomy with ileorectal anastomosis, partial omentectomy, takedown of splenic flexure, and flexible sigmoidoscopy on 2021.     INTERVAL HISTORY: Justa returns to discuss her bowel habits.  Her bowel movements are mushy to sometimes liquid and she is having bowel movements a bit more often than her baseline.  Denies blood per rectum.  She thinks this is related to discontinuing her fiber and Imodium, as well as life stressors.  Denies abdominal pain, fevers, or chills. Tolerating diet well.  Denies fecal seepage or incontinence. No nocturnal bowel movements.  Denies urinary issues.  No blood per rectum.    Physical Examination:  /74   Pulse 99   Ht 5' 4.6\"   Wt 135 lb 4.8 oz   SpO2 98%   BMI 22.79 kg/m    In no acute distress.    ASSESSMENT  Doing well postop.    FINAL DIAGNOSIS:   A. TOTAL ABDOMINAL COLECTOMY, PARTIAL OMENTECTOMY:   - Colonic wall with intramucosal and sub-mucosal lymphoid aggregates and   superficial hyperplastic changes   - No evidence of neoplastic polyp   - Appendix with fibrous obliteration of lumen   - Seven benign lymph nodes (0/7)     B. ANASTOMOSIS RINGS:   - Colonic mucosa with two tubular adenomas, negative for high-grade   dysplasia   - Ileal wall with no significant histopathologic abnormality     PLAN   1.  High-fiber diet.  Okay to use Metamucil, Benefiber, or Citrucel 1 tablespoon 1-2 times per day to bulk up stools.    2.  Imodium 2 mg 1-2 times per day.  Okay to take extra dose for travel.    3.  Sulindac 150 mg PO twice daily.    4.  Schedule flexible sigmoidoscopy with sedation with me this summer.        30 minutes spent on the date of the encounter doing chart review, history and exam, documentation and further activities as " noted above.    Wade Maguire M.D., M.Sc.     Division of Colon and Rectal Surgery  [facility]    Referring Provider:  DO BRENNEN Barry [place]  [address] [place] [address]      Primary Care Provider:  No primary care provider on file.      CC:  Flori Healy MD

## 2022-06-07 NOTE — PROGRESS NOTES
Diagnosis, Referred by & from: Dx: FAP (familial adenomatous polyposis)   Appt date: 06/10/2022   NOTES STATUS DETAILS   OFFICE NOTE from referring provider internal     OFFICE NOTE from other specialist Internal/outside records 03/17/2022, 06/08/2021 [facility] [place]   DISCHARGE SUMMARY from hospital internal 04/22/2021 [facility]   DISCHARGE REPORT from the ER internal 04/22/2021   You will need to follow-up with Traci Lemons NP in the Colon and Rectal Surgery clinic in 2-3 week(s) and then with [facility] Staff: Dr. Wade Maguire in 3-4 weeks after.     OPERATIVE REPORT INTERNAL    MEDICATION LIST N/A INTERNAL    LABS     ANAL PAP N/A    BIOPSIES/PATHOLOGY RELATED TO DIAGNOSIS Internal   07/30/2021 pathology RECTUM, POLYP:  Tubular adenoma; negative for high grade dysplasia, 08/17/2020 pathology       DIAGNOSTIC PROCEDURES     PFC TESTING (from the Pelvic floor center includes Manometry, PDNL, EMG, etc.) N/A    COLONOSCOPY INTERNAL 12/14/2020 [facility]   UPPER ENDOSCOPY (EGD) internal 08/17/2020 [facility]   FLEX SIGMOIDOSCOPY Internal  07/30/2021 [place] [facility]    ERCP N/A    IMAGING (DISC & REPORT)      CT internal    MRI internal 02/25/2021 [facility]    XRAY N?A    ULTRASOUND  (ENDOANAL/ENDORECTAL) internal  02/25/2021 u/s thyroid.

## 2022-06-10 ENCOUNTER — OFFICE VISIT (OUTPATIENT)
Dept: SURGERY | Facility: CLINIC | Age: 22
End: 2022-06-10
Payer: COMMERCIAL

## 2022-06-10 VITALS
HEART RATE: 99 BPM | OXYGEN SATURATION: 98 % | DIASTOLIC BLOOD PRESSURE: 74 MMHG | BODY MASS INDEX: 22.54 KG/M2 | SYSTOLIC BLOOD PRESSURE: 119 MMHG | WEIGHT: 135.3 LBS | HEIGHT: 65 IN

## 2022-06-10 DIAGNOSIS — D13.91 FAP (FAMILIAL ADENOMATOUS POLYPOSIS): Primary | ICD-10-CM

## 2022-06-10 PROCEDURE — 99214 OFFICE O/P EST MOD 30 MIN: CPT | Performed by: COLON & RECTAL SURGERY

## 2022-06-10 RX ORDER — LOPERAMIDE HYDROCHLORIDE 2 MG/1
2 TABLET ORAL 2 TIMES DAILY
Qty: 60 TABLET | Refills: 5 | Status: SHIPPED | OUTPATIENT
Start: 2022-06-10 | End: 2023-06-20

## 2022-06-10 RX ORDER — SULINDAC 150 MG/1
150 TABLET ORAL 2 TIMES DAILY
Qty: 90 TABLET | Refills: 11 | Status: SHIPPED | OUTPATIENT
Start: 2022-06-10 | End: 2023-06-20

## 2022-06-10 RX ORDER — METHYLCELLULOSE 2 G/19G
POWDER, FOR SOLUTION ORAL
Qty: 850 G | Refills: 3 | Status: SHIPPED | OUTPATIENT
Start: 2022-06-10 | End: 2023-01-12

## 2022-06-10 NOTE — NURSING NOTE
"Chief Complaint   Patient presents with     FAP (familial adenomatous polyposis)         Vitals:    06/10/22 1300   BP: 119/74   Pulse: 99   SpO2: 98%   Weight: 61.4 kg (135 lb 4.8 oz)   Height: 1.641 m (5' 4.6\")       Body mass index is 22.79 kg/m .    Michelle Yip MA  "

## 2022-06-10 NOTE — LETTER
"    6/10/2022         RE: Wally Varma  [address] [facility] [address]        Dear Colleague,    Thank you for referring your patient, Wally Varma, to the [facility]. Please see a copy of my visit note below.    Colon and Rectal Surgery Follow-up Clinic Note      RE: Wally Varma  : 2000  DIMAS: 76/10/22    DIAGNOSIS: 22 year old female with attenuated familial adenomatous polyposis who is now status post laparoscopic total abdominal colectomy with ileorectal anastomosis, partial omentectomy, takedown of splenic flexure, and flexible sigmoidoscopy on 2021.     INTERVAL HISTORY: Justa returns to discuss her bowel habits.  Her bowel movements are mushy to sometimes liquid and she is having bowel movements a bit more often than her baseline.  Denies blood per rectum.  She thinks this is related to discontinuing her fiber and Imodium, as well as life stressors.  Denies abdominal pain, fevers, or chills. Tolerating diet well.  Denies fecal seepage or incontinence. No nocturnal bowel movements.  Denies urinary issues.  No blood per rectum.    Physical Examination:  /74   Pulse 99   Ht 5' 4.6\"   Wt 135 lb 4.8 oz   SpO2 98%   BMI 22.79 kg/m    In no acute distress.    ASSESSMENT  Doing well postop.    FINAL DIAGNOSIS:   A. TOTAL ABDOMINAL COLECTOMY, PARTIAL OMENTECTOMY:   - Colonic wall with intramucosal and sub-mucosal lymphoid aggregates and   superficial hyperplastic changes   - No evidence of neoplastic polyp   - Appendix with fibrous obliteration of lumen   - Seven benign lymph nodes (0/7)     B. ANASTOMOSIS RINGS:   - Colonic mucosa with two tubular adenomas, negative for high-grade   dysplasia   - Ileal wall with no significant histopathologic abnormality     PLAN   1.  High-fiber diet.  Okay to use Metamucil, Benefiber, or Citrucel 1 tablespoon 1-2 times per day to bulk up stools.    2.  Imodium 2 mg 1-2 times per day.  Okay to take " extra dose for travel.    3.  Sulindac 150 mg PO twice daily.    4.  Schedule flexible sigmoidoscopy with sedation with me this summer.        30 minutes spent on the date of the encounter doing chart review, history and exam, documentation and further activities as noted above.    Wade Maguire M.D., M.Sc.     Division of Colon and Rectal Surgery  [facility]    Referring Provider:  DO KARLEE Barry  [address] [place] [address]      Primary Care Provider:  No primary care provider on file.      CC:  Flori Healy MD      Diagnosis, Referred by & from: Dx: FAP (familial adenomatous polyposis)   Appt date: 06/10/2022   NOTES STATUS DETAILS   OFFICE NOTE from referring provider internal     OFFICE NOTE from other specialist Internal/outside records 03/17/2022, 06/08/2021 [facility] [place]   DISCHARGE SUMMARY from hospital internal 04/22/2021 [facility]   DISCHARGE REPORT from the ER internal 04/22/2021   You will need to follow-up with Traci Lemons NP in the Colon and Rectal Surgery clinic in 2-3 week(s) and then with CRS Staff: Dr. Wade Maguire in 3-4 weeks after.     OPERATIVE REPORT INTERNAL    MEDICATION LIST N/A INTERNAL    LABS     ANAL PAP N/A    BIOPSIES/PATHOLOGY RELATED TO DIAGNOSIS Internal   07/30/2021 pathology RECTUM, POLYP:  Tubular adenoma; negative for high grade dysplasia, 08/17/2020 pathology       DIAGNOSTIC PROCEDURES     PFC TESTING (from the Pelvic floor center includes Manometry, PDNL, EMG, etc.) N/A    COLONOSCOPY INTERNAL 12/14/2020 [facility]   UPPER ENDOSCOPY (EGD) internal 08/17/2020 [facility]   FLEX SIGMOIDOSCOPY Internal  07/30/2021 [place] [facility]    ERCP N/A    IMAGING (DISC & REPORT)      CT internal    MRI internal 02/25/2021 [facility]    XRAY N?A    ULTRASOUND  (ENDOANAL/ENDORECTAL) internal  02/25/2021 u/[facility]  thyroid.             Again, thank you for allowing me to participate in the care of your patient.        Sincerely,        Wade Maguire MD

## 2022-06-13 ENCOUNTER — TELEPHONE (OUTPATIENT)
Dept: GASTROENTEROLOGY | Facility: CLINIC | Age: 22
End: 2022-06-13
Payer: COMMERCIAL

## 2022-06-13 NOTE — TELEPHONE ENCOUNTER
Screening Questions    BlueKIND OF PREP RedLOCATION [review exclusion criteria] GreenSEDATION TYPE      1. Are you active on mychart? Y    2. What insurance is in the chart? [employer]     3.   Ordering/Referring Provider: Wade Maguire MD    4. BMI   (If greater than 40 review exclusion criteria [PAC APPT IF [MAC] @ [facility])  23.2  [If yes, BMI OVER 40-EXTENDED PREP]      **(Sedation review/consideration needed)**  Do you have a legal guardian or Medical Power of    and/or are you able to give consent for your medical care?     SELF     5. Have you had a positive covid test in the last 90 days?   N     6.  Are you currently on dialysis?   N [ If yes, G-PREP & HOSPITAL setting ONLY]     7.  Do you have chronic kidney disease?  N [ If yes, G-PREP ]    8.   Do you have a diagnosis of diabetes?   N   [ If yes, G-PREP ]    9.  On a regular basis do you go 3-5 days between bowel movements?   N   [ If yes, EXTENDED PREP]    10.  Are you taking any prescription pain medications on a routine schedule?    N  [ If yes, EXTENDED PREP] [If yes, MAC]      11.   Do you have any chemical dependencies such as alcohol, street drugs, or methadone?    N [If yes, MAC]    12.   Do you have any history of post-traumatic stress syndrome, severe anxiety or history of psychosis?    N  [If yes, MAC]    13.  [FEMALES] Are you currently pregnant? N    If yes, how many weeks?       Respiratory/Heart Screening:  [If yes to any of the following HOSPITAL setting only]     14. Do you have Pulmonary Hypertension [Lungs]?   N       15. Do you have UNCONTROLLED asthma?   N     16.  Do you use daily home oxygen?  N      17. Do you have mod to severe Obstructive Sleep Apnea?         (OKAY @ [facility]  [facility]  SH  PH  RI  MG - if pt is not on OXYGEN)  N      18.   Have you had a heart or lung transplant?   N      19.   Have you had a stroke or Transient ischemic attack (TIA - aka  mini stroke ) within 6 months?  (If yes, please review exclusion  criteria)  N     20.   In the past 6 months, have you had any heart related issues including cardiomyopathy or heart attack?   N           If yes, did it require cardiac stenting or other implantable device?   N      21.   Do you have any implantable devices in your body (pacemaker, defib, LVAD)? (If yes, please review exclusion criteria)  N     22. Do you take nitroglycerin?   N           If yes, how often? N  (if yes, HOSPITAL setting ONLY)    23.  Are you currently taking any blood thinners?    [If yes, INFORM patient to follw up w/ ORDERING PROVIDER FOR BRIDGING INSTRUCTIONS]     Y - BLOOD THINNER     24.   Do you transfer independently?                (If NO, please HOSPITAL setting ONLY)  Y    25.   Preferred LOCAL Pharmacy for Pre Prescription:         [id number] - [place], [place] - [address] [place] AT [facility]  & [address]      Scheduling Details  (Please ask for phone number if not scheduled by patient)      Caller : Wally Varma    Date of Procedure: 7/20/2022  Surgeon: JOHN   Location: [facility], [address] [place] [address]        Sedation Type: MAC  l PER LOC   Conscious Sedation- Needs  for 6 hours after the procedure  MAC/General-Needs  for 24 hours after procedure    N :[Pre-op Required] at [facility]  SH  MG and OR for MAC sedation   (advise patient they will need a pre-op WITH IN 30 DAYS of procedure date)     Type of Procedure Scheduled:   FLEXIBLE SIGMOIDOSCOPY [Flex Sig]    Which Colonoscopy Prep was Sent?:   FLEX  - PER PROTOCOL     REBEKA CF PATIENTS & GROEN'S PATIENTS NEEDS EXTENDED PREP       Informed patient they will need an adult  Y  Cannot take any type of public or medical transportation alone    Pre-Procedure Covid test to be completed at [facility] or Externally: Y  **INFORMED OF HOME TESTING & LAB OPTION**        Confirmed Nurse will call to complete assessment Y    Additional comments: N

## 2022-07-12 ENCOUNTER — TELEPHONE (OUTPATIENT)
Dept: GASTROENTEROLOGY | Facility: CLINIC | Age: 22
End: 2022-07-12

## 2022-07-12 NOTE — TELEPHONE ENCOUNTER
"Attempted to contact patient regarding upcoming flexible sigmoidoscopy procedure on 7.20.2022 for pre assessment questions. No answer.     Left message to return call to [phone] #[phone]    Discuss at home rapid antigen COVID test 1-2 days prior to procedure.    Arrival time: 0630    Facility location: [facility]    Sedation type: MAC    Indication for procedure: FAP    Anticoagulants: Verify as per endo questionnaire pt answered \"yes\" to blood thinning medications.    Bowel prep recommendation: fleet enemas; verify how pt prepped for last flexible sigmoidoscopy.  Per last flex sig report bowel prep was poor.    July Gunter RN      "

## 2022-07-13 NOTE — TELEPHONE ENCOUNTER
Patient returned call.     Pre assessment questions completed for upcoming flex sigmoidoscopy procedure scheduled on 7/20/22    COVID policy reviewed. Patient to complete rapid antigen test one to two days before their scheduled procedure. Patient to bring photo of the results when they come in for their procedure.    Reviewed procedural arrival time 0630 and facility location [facility].    Designated  policy reviewed. Instructed to have someone stay 24 hours post procedure.     Reviewed enemas prep instructions with patient. NPO 4 hours prior to procedure. Explained importance of doing two enemas regardless if first one resulted in a bowel movement or not. Patient verbalized understanding.     Anticoagulation/blood thinners? no    Patient verbalized understanding and had no questions or concerns at this time.    Lorri Guadarrama RN

## 2022-07-20 ENCOUNTER — LAB REQUISITION (OUTPATIENT)
Dept: LAB | Facility: CLINIC | Age: 22
End: 2022-07-20
Payer: COMMERCIAL

## 2022-07-20 ENCOUNTER — TRANSFERRED RECORDS (OUTPATIENT)
Dept: HEALTH INFORMATION MANAGEMENT | Facility: CLINIC | Age: 22
End: 2022-07-20

## 2022-07-20 PROCEDURE — 88305 TISSUE EXAM BY PATHOLOGIST: CPT | Mod: TC,ORL | Performed by: COLON & RECTAL SURGERY

## 2022-07-20 PROCEDURE — 88305 TISSUE EXAM BY PATHOLOGIST: CPT | Mod: 26 | Performed by: PATHOLOGY

## 2022-08-22 ENCOUNTER — OFFICE VISIT (OUTPATIENT)
Dept: FAMILY MEDICINE | Facility: CLINIC | Age: 22
End: 2022-08-22
Payer: COMMERCIAL

## 2022-08-22 VITALS
WEIGHT: 132.9 LBS | TEMPERATURE: 98.3 F | HEART RATE: 82 BPM | SYSTOLIC BLOOD PRESSURE: 109 MMHG | RESPIRATION RATE: 14 BRPM | DIASTOLIC BLOOD PRESSURE: 75 MMHG | OXYGEN SATURATION: 97 % | BODY MASS INDEX: 22.39 KG/M2

## 2022-08-22 DIAGNOSIS — Z11.3 SCREENING FOR STDS (SEXUALLY TRANSMITTED DISEASES): ICD-10-CM

## 2022-08-22 DIAGNOSIS — Z01.419 WOMEN'S ANNUAL ROUTINE GYNECOLOGICAL EXAMINATION: Primary | ICD-10-CM

## 2022-08-22 DIAGNOSIS — Z12.4 CERVICAL CANCER SCREENING: ICD-10-CM

## 2022-08-22 DIAGNOSIS — Z30.09 ENCOUNTER FOR OTHER GENERAL COUNSELING AND ADVICE ON CONTRACEPTION: ICD-10-CM

## 2022-08-22 PROCEDURE — 87491 CHLMYD TRACH DNA AMP PROBE: CPT | Performed by: FAMILY MEDICINE

## 2022-08-22 PROCEDURE — G0124 SCREEN C/V THIN LAYER BY MD: HCPCS

## 2022-08-22 PROCEDURE — 99395 PREV VISIT EST AGE 18-39: CPT | Performed by: FAMILY MEDICINE

## 2022-08-22 PROCEDURE — G0145 SCR C/V CYTO,THINLAYER,RESCR: HCPCS | Performed by: FAMILY MEDICINE

## 2022-08-22 PROCEDURE — 87591 N.GONORRHOEAE DNA AMP PROB: CPT | Performed by: FAMILY MEDICINE

## 2022-08-22 ASSESSMENT — PAIN SCALES - GENERAL: PAINLEVEL: NO PAIN (0)

## 2022-08-22 NOTE — PATIENT INSTRUCTIONS
Pap smear today.  Testing for vaginal infection/STD today.    You can call [phone] to set up appointment with Gynecology for IUD placement.

## 2022-08-22 NOTE — PROGRESS NOTES
Assessment & Plan     Cervical cancer screening  First PAP performed today.    - Pap Screen only - recommended age 21 - 24 years    Screening for STDs (sexually transmitted diseases)  screening  - NEISSERIA GONORRHOEA PCR  - CHLAMYDIA TRACHOMATIS PCR    Encounter for other general counseling and advice on contraception  Discussed options for contraception - focus primarily on the IUD options of Progesterone IUD (mirena) and Copper IUD.  Discussed similarities and differences, risks and benefits.  She is likely going to pursue the Copper IUD but the absence of menses with the progesterone IUD may sway her that direction.  She will contact GYN for IUD placement with her next menses.        Ordering of each unique test  16 minutes spent on the date of the encounter doing chart review, review of test results, patient visit and documentation        Patient Instructions   Pap smear today.  Testing for vaginal infection/STD today.    You can call [phone] to set up appointment with Gynecology for IUD placement.            Return in about 3 weeks (around 9/12/2022) for IUD placement.    Alda Mccain MD  [facility]    James Marr is a 22 year old presenting for the following health issues:  Gyn Exam and IUD      IUD    History of Present Illness       Reason for visit:  Check up    She eats 2-3 servings of fruits and vegetables daily.She consumes 1 sweetened beverage(s) daily.She exercises with enough effort to increase her heart rate 60 or more minutes per day.  She exercises with enough effort to increase her heart rate 4 days per week. She is missing 3 dose(s) of medications per week.  She is not taking prescribed medications regularly due to remembering to take.     Numerous people with copper type IUD - thinks she would like to try this.    When on OCP - had a hard time remembering to take it.  Some mood issues with the hormone.          Review of Systems   Constitutional,  HEENT, cardiovascular, pulmonary, gi and gu systems are negative, except as otherwise noted.      Objective    /75 (BP Location: Right arm, Patient Position: Sitting, Cuff Size: Adult Regular)   Pulse 82   Temp 98.3  F (36.8  C) (Oral)   Resp 14   Wt 60.3 kg (132 lb 14.4 oz)   SpO2 97%   BMI 22.39 kg/m    Body mass index is 22.39 kg/m .  Physical Exam   GENERAL: healthy, alert and no distress  NECK: no adenopathy, no asymmetry, masses, or scars and thyroid normal to palpation  RESP: lungs clear to auscultation - no rales, rhonchi or wheezes  CV: regular rate and rhythm, normal S1 S2, no S3 or S4, no murmur, click or rub, no peripheral edema and peripheral pulses strong  ABDOMEN: soft, nontender, no hepatosplenomegaly, no masses and bowel sounds normal   (female): normal female external genitalia, normal urethral meatus, vaginal mucosa, normal cervix/adnexa/uterus without masses or discharge  MS: no gross musculoskeletal defects noted, no edema  SKIN: no suspicious lesions or rashes  BACK: no CVA tenderness, no paralumbar tenderness  PSYCH: mentation appears normal, affect normal/bright                    .  ..

## 2022-08-23 LAB
C TRACH DNA SPEC QL NAA+PROBE: NEGATIVE
N GONORRHOEA DNA SPEC QL NAA+PROBE: NEGATIVE

## 2022-08-23 NOTE — RESULT ENCOUNTER NOTE
The testing for STDs is negative.  Please call or MyChart message me if you have any questions.      PSK

## 2022-08-28 LAB
BKR LAB AP GYN ADEQUACY: ABNORMAL
BKR LAB AP GYN INTERPRETATION: ABNORMAL
BKR LAB AP HPV REFLEX: NO
BKR LAB AP PREVIOUS ABNORMAL: ABNORMAL
PATH REPORT.COMMENTS IMP SPEC: ABNORMAL
PATH REPORT.COMMENTS IMP SPEC: ABNORMAL
PATH REPORT.RELEVANT HX SPEC: ABNORMAL

## 2022-08-29 ENCOUNTER — PATIENT OUTREACH (OUTPATIENT)
Dept: FAMILY MEDICINE | Facility: CLINIC | Age: 22
End: 2022-08-29

## 2022-09-02 ENCOUNTER — OFFICE VISIT (OUTPATIENT)
Dept: URGENT CARE | Facility: URGENT CARE | Age: 22
End: 2022-09-02
Payer: COMMERCIAL

## 2022-09-02 VITALS
OXYGEN SATURATION: 96 % | TEMPERATURE: 99.2 F | WEIGHT: 131.6 LBS | HEART RATE: 88 BPM | DIASTOLIC BLOOD PRESSURE: 67 MMHG | SYSTOLIC BLOOD PRESSURE: 124 MMHG | BODY MASS INDEX: 22.17 KG/M2

## 2022-09-02 DIAGNOSIS — R30.0 DYSURIA: Primary | ICD-10-CM

## 2022-09-02 LAB
ALBUMIN UR-MCNC: NEGATIVE MG/DL
APPEARANCE UR: CLEAR
BACTERIA #/AREA URNS HPF: ABNORMAL /HPF
BILIRUB UR QL STRIP: NEGATIVE
CLUE CELLS: ABNORMAL
COLOR UR AUTO: YELLOW
GLUCOSE UR STRIP-MCNC: NEGATIVE MG/DL
HGB UR QL STRIP: NEGATIVE
KETONES UR STRIP-MCNC: NEGATIVE MG/DL
LEUKOCYTE ESTERASE UR QL STRIP: ABNORMAL
NITRATE UR QL: NEGATIVE
PH UR STRIP: 6 [PH] (ref 5–7)
RBC #/AREA URNS AUTO: ABNORMAL /HPF
SP GR UR STRIP: 1.02 (ref 1–1.03)
SQUAMOUS #/AREA URNS AUTO: ABNORMAL /LPF
TRICHOMONAS, WET PREP: ABNORMAL
UROBILINOGEN UR STRIP-ACNC: 0.2 E.U./DL
WBC #/AREA URNS AUTO: ABNORMAL /HPF
WBC'S/HIGH POWER FIELD, WET PREP: ABNORMAL
YEAST, WET PREP: ABNORMAL

## 2022-09-02 PROCEDURE — 87210 SMEAR WET MOUNT SALINE/INK: CPT | Performed by: PHYSICIAN ASSISTANT

## 2022-09-02 PROCEDURE — 87491 CHLMYD TRACH DNA AMP PROBE: CPT | Performed by: PHYSICIAN ASSISTANT

## 2022-09-02 PROCEDURE — 87591 N.GONORRHOEAE DNA AMP PROB: CPT | Performed by: PHYSICIAN ASSISTANT

## 2022-09-02 PROCEDURE — 99213 OFFICE O/P EST LOW 20 MIN: CPT | Performed by: PHYSICIAN ASSISTANT

## 2022-09-02 PROCEDURE — 81001 URINALYSIS AUTO W/SCOPE: CPT | Performed by: PHYSICIAN ASSISTANT

## 2022-09-02 RX ORDER — NITROFURANTOIN 25; 75 MG/1; MG/1
100 CAPSULE ORAL 2 TIMES DAILY
Qty: 6 CAPSULE | Refills: 0 | Status: SHIPPED | OUTPATIENT
Start: 2022-09-02 | End: 2022-09-05

## 2022-09-02 NOTE — PROGRESS NOTES
Results for orders placed or performed in visit on 09/02/22   UA Macro with Reflex to Micro and Culture - lab collect     Status: Abnormal    Specimen: Urine, Clean Catch   Result Value Ref Range    Color Urine Yellow Colorless, Straw, Light Yellow, Yellow    Appearance Urine Clear Clear    Glucose Urine Negative Negative mg/dL    Bilirubin Urine Negative Negative    Ketones Urine Negative Negative mg/dL    Specific Gravity Urine 1.025 1.003 - 1.035    Blood Urine Negative Negative    pH Urine 6.0 5.0 - 7.0    Protein Albumin Urine Negative Negative mg/dL    Urobilinogen Urine 0.2 0.2, 1.0 E.U./dL    Nitrite Urine Negative Negative    Leukocyte Esterase Urine Trace (A) Negative   Wet prep - lab collect     Status: Abnormal    Specimen: Vagina; Swab   Result Value Ref Range    Trichomonas Absent Absent    Yeast Absent Absent    Clue Cells Absent Absent    WBCs/high power field 2+ (A) None             ASSESSMENT:    ICD-10-CM    1. Dysuria  R30.0 UA Macro with Reflex to Micro and Culture - lab collect     Wet prep - lab collect     NEISSERIA GONORRHOEA PCR     CHLAMYDIA TRACHOMATIS PCR           PLAN: Urine micro is still not back, apparently there is a new .  I will call patient with results.  Antibiotic for Macrobid 2 times daily for 3 days sent for presumptive UTI.  Chlamydia and gonorrhea are pending.  As per ordered above.  Drink plenty of fluids.  Prevention and treatment of UTI's discussed. Follow up with primary care physician if not improving.  Advised about symptoms which might herald more serious problems.          Ale Boothe PA-C        Subjective: 22-year-old female presents for burning sensation vaginal area.  Hurts to pee.  No discharge or odor.  Started today.   LMP 8/8, normal.  No fever, chills, abdominal pain, back pain.  Negative chlamydia and gonorrhea 820-second.  Wants repeat as she is worried that test was done too soon.      Allergies   Allergen Reactions     Latex Hives        Past Medical History:   Diagnosis Date     Familial polyposis        albuterol (PROAIR HFA/PROVENTIL HFA/VENTOLIN HFA) 108 (90 Base) MCG/ACT inhaler, Inhale 2 puffs into the lungs every 4 hours as needed (Wheeze or cough)  loperamide (IMODIUM A-D) 2 MG tablet, Take 1 tablet (2 mg) by mouth 2 times daily  methylcellulose (CITRUCEL) powder, Take 1 tablespoon 1-2 times per day.  sulindac (CLINORIL) 150 MG tablet, Take 1 tablet (150 mg) by mouth 2 times daily    No current facility-administered medications on file prior to visit.      Social History     Tobacco Use     Smoking status: Never Smoker     Smokeless tobacco: Never Used   Vaping Use     Vaping Use: Never used   Substance Use Topics     Alcohol use: Not Currently     Comment: rare     Drug use: Never       ROS:  General: negative for fever  ABD: Denies abd pain  : as above    OBJECTIVE:   /67 (BP Location: Left arm, Patient Position: Sitting, Cuff Size: Adult Regular)   Pulse 88   Temp 99.2  F (37.3  C) (Tympanic)   Wt 59.7 kg (131 lb 9.6 oz)   SpO2 96%   BMI 22.17 kg/m      General:   awake, alert, and cooperative.  NAD.   Head: Normocephalic, atraumatic.  Eyes: Conjunctiva clear, non icteric.   Neuro: Alert and oriented - normal speech.

## 2022-09-03 LAB
C TRACH DNA SPEC QL NAA+PROBE: NEGATIVE
N GONORRHOEA DNA SPEC QL NAA+PROBE: NEGATIVE

## 2022-09-09 ENCOUNTER — OFFICE VISIT (OUTPATIENT)
Dept: OBGYN | Facility: CLINIC | Age: 22
End: 2022-09-09
Payer: COMMERCIAL

## 2022-09-09 VITALS
BODY MASS INDEX: 22.04 KG/M2 | DIASTOLIC BLOOD PRESSURE: 69 MMHG | WEIGHT: 130.8 LBS | HEART RATE: 85 BPM | SYSTOLIC BLOOD PRESSURE: 117 MMHG | OXYGEN SATURATION: 99 %

## 2022-09-09 DIAGNOSIS — Z32.00 PREGNANCY EXAMINATION OR TEST, PREGNANCY UNCONFIRMED: Primary | ICD-10-CM

## 2022-09-09 DIAGNOSIS — Z30.430 ENCOUNTER FOR IUD INSERTION: ICD-10-CM

## 2022-09-09 LAB — HCG UR QL: NEGATIVE

## 2022-09-09 PROCEDURE — 81025 URINE PREGNANCY TEST: CPT | Performed by: OBSTETRICS & GYNECOLOGY

## 2022-09-09 PROCEDURE — 58300 INSERT INTRAUTERINE DEVICE: CPT | Performed by: OBSTETRICS & GYNECOLOGY

## 2022-09-09 NOTE — PROGRESS NOTES
This 23 y/o female, , LMP 2022, using condoms for contraception, presents as a new patient to the [place] gyn dept for insertion of a Mirena IUD for contraception.  She tried the BCP in the past but discontinued this 4 years ago due to unpleasant side effects including nausea, decreased appetite, and feelings of sadness.  She switched to condoms but prefers a LARC method.  Her options were discussed and she prefers the Mirena IUD since both her mother and sister have used this method without issues.  Informed consent was reviewed and obtained for Mirena insertion and today's UPT was negative.  She denies any recent pregnancy exposure or STD risk.  /69 (BP Location: Right arm, Patient Position: Sitting, Cuff Size: Adult Regular)   Pulse 85   Wt 59.3 kg (130 lb 12.8 oz)   LMP 2022 (Exact Date)   SpO2 99%   BMI 22.04 kg/m    ROS:  10 systems were reviewed and the positives were listed under problems.  In the dorsal lithotomy position using sterile technique, a bi-valve speculum was placed and her cervix was cleansed with betadine swabs x 3.  The 12 o'clock position of her cervix was grasped with a single-toothed tenaculum and the inner cervical os was dilated using an OsFinder.  The Mirena IUD was loaded and inserted per protocol and her anteverted uterus sounded to 7 cm.  The 2 IUD strings were trimmed to 3 cm each and all instruments were removed.  EBL was 1 ml and there were no complications.  F/u care and instructions were reviewed and she was provided both the reminder card and instructions booklet.    Assessment - IUD insertion (Mirena) for contraception  Plan - She was instructed on how to check for the IUD strings each month and she understands that this IUD will be due for removal/replacement in 8 years - 2030.  20 minutes were spent today in chart review, the patient visit, review of tests, and documentation in regard to the issues noted above.  This did NOT include the time spent  in the procedure (IUD insertion).  Mirena IUD NDC #55870-850-26  Exp Sept 2024  Lot #[id number]

## 2022-09-09 NOTE — PATIENT INSTRUCTIONS
If you have any questions regarding your visit, Please contact your care team.    [facility]: [phone]    To Schedule an Appointment 24/7  Call: [phone]   Scarlet Francis DO.    HAROON Winters -Surgery Scheduler  Sherry - Surgery Scheduler    Yahaira, KIMBERLY Sweet, RN  Lanette, KIMBERLY   [place]  Wednesday and Friday  8:30 a.m-5:00 p.m    [place]-Temporary  Monday 8:30 a.m-5:00 p.m  Typical Surgery day:  Tuesday [facility]  [address] [place] [address] [phone] Phone  [phone] Fax    Imaging Scheduling-All Locations [phone]    [facility]  [address] [place] [address]     Urgent Care locations:  [facility] Monday-Friday   10 am - 8 pm  Saturday and Sunday   9 am - 5 pm [phone]   **Surgeries** Our Surgery Schedulers will contact you to schedule. If you do not receive a call within 3 business days, please call [phone].    [facility] Labor and Delivery:  [phone]    If you need a medication refill, please contact your pharmacy. Please allow 3 business days for your refill to be completed.  As always, Thank you for trusting us with your healthcare needs!  see additional instructions from your care team below

## 2022-09-18 ENCOUNTER — HEALTH MAINTENANCE LETTER (OUTPATIENT)
Age: 22
End: 2022-09-18

## 2022-10-20 ENCOUNTER — TELEPHONE (OUTPATIENT)
Dept: OBGYN | Facility: CLINIC | Age: 22
End: 2022-10-20

## 2022-10-20 NOTE — TELEPHONE ENCOUNTER
[facility] Call Center    Phone Message    May a detailed message be left on voicemail: yes     Reason for Call: Other:      Pt is requesting a call back to discuss the spotting they are experiencing and if it is normal to have after having the Jess placed.    Action Taken: Message routed to:  [facility] p [phone]    Travel Screening: Not Applicable

## 2022-10-20 NOTE — TELEPHONE ENCOUNTER
Pt had a Mirena IUD placed by Dr. Francis on 9/9/22.    Pt had concerns for irregular bleeding since getting the IUD placed.  I explained to pt that this is not uncommon to experience irregular bleeding for the first 3-6 months after starting a new hormonal birth control as her body is adjusting to the hormones.    Yahaira Rey RN

## 2022-11-15 ENCOUNTER — OFFICE VISIT (OUTPATIENT)
Dept: FAMILY MEDICINE | Facility: CLINIC | Age: 22
End: 2022-11-15
Payer: COMMERCIAL

## 2022-11-15 VITALS
SYSTOLIC BLOOD PRESSURE: 120 MMHG | DIASTOLIC BLOOD PRESSURE: 68 MMHG | OXYGEN SATURATION: 98 % | WEIGHT: 128.2 LBS | TEMPERATURE: 97.4 F | HEART RATE: 80 BPM | RESPIRATION RATE: 16 BRPM | BODY MASS INDEX: 21.89 KG/M2 | HEIGHT: 64 IN

## 2022-11-15 DIAGNOSIS — M25.842 CYST OF JOINT OF LEFT HAND: Primary | ICD-10-CM

## 2022-11-15 PROCEDURE — 99213 OFFICE O/P EST LOW 20 MIN: CPT | Performed by: NURSE PRACTITIONER

## 2022-11-15 ASSESSMENT — PAIN SCALES - GENERAL: PAINLEVEL: SEVERE PAIN (7)

## 2022-11-15 NOTE — PATIENT INSTRUCTIONS
At [facility], we strive to deliver an exceptional experience to you, every time we see you. If you receive a survey, please complete it as we do value your feedback.  If you have MyChart, you can expect to receive results automatically within 24 hours of their completion.  Your provider will send a note interpreting your results as well.   If you do not have MyChart, you should receive your results in about a week by mail.    Your care team:                            Family Medicine Internal Medicine   MD Quincy Ramos MD Shantel Branch-Fleming, MD Srinivasa Vaka, MD Katya Belousova, PALUIS Mccurdy CNP, MD ([facility]) [facility]   Ramírez Sorenson, MD Rachael Perez MD Amelia Massimini APRN JO ANN Montaño APRN MD Mary Henry MD          Clinic hours: Monday - Thursday 7 am-6 pm; Fridays 7 am-5 pm.   Urgent care: Monday - Friday 10 am- 8 pm; Saturday and Sunday 9 am-5 pm.    Clinic: [phone]       [facility]: Monday - Thursday 8 am - 7 pm; Friday 8 am - 6 pm  [facility]: [phone]

## 2022-11-15 NOTE — PROGRESS NOTES
Assessment & Plan     Cyst of joint of left hand  Refer to ortho.  - Orthopedic  Referral; Future         See Patient Instructions    Return in about 4 weeks (around 12/13/2022), or if symptoms worsen or fail to improve.    LUIS WHITMAN CNP  [facility] LOUIE Marr is a 22 year old, presenting for the following health issues:  Mass (Bump on left hand)      Mass    History of Present Illness       Reason for visit:  Bump on left hand middle finger    She eats 2-3 servings of fruits and vegetables daily.She consumes 1 sweetened beverage(s) daily.She exercises with enough effort to increase her heart rate 30 to 60 minutes per day.  She exercises with enough effort to increase her heart rate 4 days per week. She is missing 2 dose(s) of medications per week.  She is not taking prescribed medications regularly due to remembering to take.       Pain History:  When did you first notice your pain? - Acute Pain three months   Have you seen anyone else for your pain? No  Where in your body do you have pain? Middle finger left hand  Onset/Duration: three months  Description:   Location: middle finger  Radiation: none  Intensity: severe  Progression of Symptoms:  worsening  Accompanying Signs & Symptoms:  Burning, tingling, prickly sensation in arm(s): No  Numbness in arm(s): No  Weakness in arm(s):  No  Fever: No  Headache: No  Nausea and/or vomiting: No  History:   Trauma: No  Previous neck pain: No  Previous surgery or injections: No  Previous Imaging (MRI,X ray): No  Precipitating or alleviating factors: None  Does movement impact the pain:  YES  Therapies tried and outcome: nothing           Left middle finger  Right hand dominant  Works as a  and it's getting in the way  Getting bigger and more painful  Hard to do her job    Review of Systems   Constitutional, HEENT, cardiovascular, pulmonary, gi and gu systems are negative, except as otherwise noted.    "   Objective    /68 (BP Location: Left arm, Patient Position: Sitting, Cuff Size: Adult Regular)   Pulse 80   Temp 97.4  F (36.3  C) (Oral)   Resp 16   Ht 1.62 m (5' 3.78\")   Wt 58.2 kg (128 lb 3.2 oz)   SpO2 98%   BMI 22.16 kg/m    Body mass index is 22.16 kg/m .  Physical Exam   GENERAL: healthy, alert and no distress  MS: no gross musculoskeletal defects noted, no edema. Firm cystic lesion to left MCP  PSYCH: mentation appears normal, affect normal/bright                    "

## 2022-11-29 ENCOUNTER — PRE VISIT (OUTPATIENT)
Dept: ORTHOPEDICS | Facility: CLINIC | Age: 22
End: 2022-11-29

## 2022-11-30 ENCOUNTER — NURSE TRIAGE (OUTPATIENT)
Dept: NURSING | Facility: CLINIC | Age: 22
End: 2022-11-30

## 2022-11-30 NOTE — TELEPHONE ENCOUNTER
"Nurse Triage SBAR    Is this a 2nd Level Triage? NO    Situation:  Not feeling well x 4 days. Chest tightness, chest pain, dry cough. COVID neg x 2.    Background: Patient,Justa, calling. Consent: not needed.    Assessment:  Mild runny nose, dry cough, diarrhea x 2 days. Urinating well. Nausea. Vomiting 2 days ago but no vomiting now.  Fever the first day, but no fever since.  Feeling better today, other than her chest tightness, dry cough, and SOB.  Feels like she has to gasp for air as her chest is really tight. Feels like heart is beating faster, but hasn't check her pulse.  Has been using her Albuterol inhaler \"quite often today\" with no relief.  Chest pain even when not coughing, chest tightness all of the time.      Protocol Recommended Disposition: Emergency Department    Recommendation: According to the protocol, Patient should go to the ED now. Advised Patient to go to the ED now. Care advice given. Patient verbalizes understanding and agrees with plan of care. Reviewed concerning symptoms and when to call back and patient verbalized understanding and agreed to follow care advice given.         Fidelia Samaniego RN  [facility] Nurse Advisor  11/30/2022 at 11:25 AM        Reason for Disposition    MODERATE difficulty breathing (e.g., speaks in phrases, SOB even at rest, pulse 100-120) and still present when not coughing    Additional Information    Negative: Bluish (or gray) lips or face    Negative: SEVERE difficulty breathing (e.g., struggling for each breath, speaks in single words)    Negative: Rapid onset of cough and has hives    Negative: Coughing started suddenly after medicine, an allergic food or bee sting    Negative: Difficulty breathing after exposure to flames, smoke, or fumes    Negative: Sounds like a life-threatening emergency to the triager    Negative: Previous asthma attacks and this feels like asthma attack    Negative: Dry cough (non-productive; no sputum or minimal clear sputum) and " within 14 days of COVID-19 Exposure    Protocols used: COUGH-A-OH

## 2022-12-13 ENCOUNTER — OFFICE VISIT (OUTPATIENT)
Dept: ORTHOPEDICS | Facility: CLINIC | Age: 22
End: 2022-12-13
Payer: COMMERCIAL

## 2022-12-13 DIAGNOSIS — M67.40 GANGLION CYST: Primary | ICD-10-CM

## 2022-12-13 PROCEDURE — 99204 OFFICE O/P NEW MOD 45 MIN: CPT | Performed by: PLASTIC SURGERY

## 2022-12-13 NOTE — LETTER
12/13/2022         RE: Wally Varma  [address] [facility] [address]        Dear Colleague,    Thank you for referring your patient, Wally Varma, to the [facility]. Please see a copy of my visit note below.    CONSULT NOTE    REFERRING PROVIDER:  Martha Unger CNP    PRESENTING COMPLAINT:  Consultation for left hand long finger mass.    HISTORY OF PRESENTING COMPLAINT:  Mary is 22 years old.  She is right-hand dominant.  She has had a mass on her volar aspect of her long finger at the base for the last 6 months.  It is increasing in size, causing her pain.  Here to have it looked at.    PAST MEDICAL HISTORY:  Familial adenomatous polyposis, Alarcon syndrome.    PAST SURGICAL HISTORY:  Colectomy.    MEDICATIONS:  Sulindac.    ALLERGIES:  Latex.    SOCIAL HISTORY:  Does not smoke, socially drinks.  Lives in [place]. Is a .    REVIEW OF SYSTEMS:  Denies chest pain, shortness of breath, MI, CVA, DVT and PE.    PHYSICAL EXAMINATION:  Vital signs stable.  She is afebrile, in no obvious distress.  On examination of her left long finger at the base volarly, she has a retinacular cyst.  No triggering.  Grossly, sensation intact.    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  Given the fact it is growing in size and causing her pain, I think excision is an option.  This can be done under local anesthesia.  I explained to her how it would be done, showed her where the scars would be.  All risks, benefits and alternatives including pain, infection, bleeding, scarring, asymmetry, seromas, hematomas, injury to deeper structures like nerves, vessels and tendons, recurrence, requirement of further surgery depending on pathology, tenosynovitis, stiffness, CRPS, DVT, PE, MI, CVA, pneumonia, and death were explained.  She understood them all and wants to proceed.  We will schedule her as soon as  possible.  All questions answered.  She was happy with the visit.    Total time spent in the encounter today, including chart review, visit itself and post-visit with paperwork, was 45 minutes.    KRYSTEN Wolff MD    cc:  Martha Unger [address]  [address]            Again, thank you for allowing me to participate in the care of your patient.        Sincerely,        TONY Wolff MD

## 2022-12-13 NOTE — PROGRESS NOTES
CONSULT NOTE    REFERRING PROVIDER:  Martha Unger CNP    PRESENTING COMPLAINT:  Consultation for left hand long finger mass.    HISTORY OF PRESENTING COMPLAINT:  Mary is 22 years old.  She is right-hand dominant.  She has had a mass on her volar aspect of her long finger at the base for the last 6 months.  It is increasing in size, causing her pain.  Here to have it looked at.    PAST MEDICAL HISTORY:  Familial adenomatous polyposis, Alarcon syndrome.    PAST SURGICAL HISTORY:  Colectomy.    MEDICATIONS:  Sulindac.    ALLERGIES:  Latex.    SOCIAL HISTORY:  Does not smoke, socially drinks.  Lives in [place]. Is a .    REVIEW OF SYSTEMS:  Denies chest pain, shortness of breath, MI, CVA, DVT and PE.    PHYSICAL EXAMINATION:  Vital signs stable.  She is afebrile, in no obvious distress.  On examination of her left long finger at the base volarly, she has a retinacular cyst.  No triggering.  Grossly, sensation intact.    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  Given the fact it is growing in size and causing her pain, I think excision is an option.  This can be done under local anesthesia.  I explained to her how it would be done, showed her where the scars would be.  All risks, benefits and alternatives including pain, infection, bleeding, scarring, asymmetry, seromas, hematomas, injury to deeper structures like nerves, vessels and tendons, recurrence, requirement of further surgery depending on pathology, tenosynovitis, stiffness, CRPS, DVT, PE, MI, CVA, pneumonia, and death were explained.  She understood them all and wants to proceed.  We will schedule her as soon as possible.  All questions answered.  She was happy with the visit.    Total time spent in the encounter today, including chart review, visit itself and post-visit with paperwork, was 45 minutes.    KRYSTEN Wolff MD    cc:  Martha Unger CNP  [facility]  [facility]  [address] [place]  [address]

## 2022-12-14 ENCOUNTER — TELEPHONE (OUTPATIENT)
Dept: ORTHOPEDICS | Facility: CLINIC | Age: 22
End: 2022-12-14

## 2022-12-14 NOTE — TELEPHONE ENCOUNTER
Called and LVM for patient to schedule surgery with Dr. Wolff. Provided direct call back number to writer - [phone].

## 2022-12-15 NOTE — TELEPHONE ENCOUNTER
Second attempt.    Called and LVM for patient as she called and LVM for writer on 12/14. Provided direct call back number [phone].

## 2022-12-16 PROBLEM — M67.40 GANGLION CYST: Status: ACTIVE | Noted: 2022-12-16

## 2023-01-12 ENCOUNTER — VIRTUAL VISIT (OUTPATIENT)
Dept: FAMILY MEDICINE | Facility: CLINIC | Age: 23
End: 2023-01-12
Payer: COMMERCIAL

## 2023-01-12 DIAGNOSIS — D13.91 FAP (FAMILIAL ADENOMATOUS POLYPOSIS): ICD-10-CM

## 2023-01-12 DIAGNOSIS — R07.89 ATYPICAL CHEST PAIN: Primary | ICD-10-CM

## 2023-01-12 PROCEDURE — 99213 OFFICE O/P EST LOW 20 MIN: CPT | Mod: 93 | Performed by: FAMILY MEDICINE

## 2023-01-12 NOTE — PROGRESS NOTES
Justa is a 22 year old who is being evaluated via a billable telephone visit.      What phone number would you like to be contacted at? [phone]   How would you like to obtain your AVS? Sushilhart    Distant Location (provider location):  Off-site    Assessment & Plan     Atypical chest pain  My first visit with patient and previous history reviewed with her and in her chart.  Was at the ER November 30 with some chest pain and other symptoms.  Work-up was essentially negative other than a positive influenza test and it was felt that symptoms were related to that.  But patient says she has had intermittent left-sided chest pain since then.  Cannot think of any particular exacerbating factors.  Tends to be in the same area and is quite sharp but not associate with any cardiac symptoms per se and does not make her short of breath.  She does not feel tight or wheezy or have a history of asthma.  No recent illness.  Not palpable but is somewhat positional.  So it sounds like we are talking about chest wall muscle spasm and I do not have a great explanation as to why this is necessarily happening.  She is on scheduled sulindac because of her familial polyposis and tends not to use other analgesics.  So we talked a bit about some stretching activities that might help.  But overall does not seem anything pathologic and that was reassuring to the patient.  Contact me with progress.      FAP (familial adenomatous polyposis)  As above        See Patient Instructions    Return in about 1 week (around 1/19/2023) for If not improving as expected, SeeOn message.    Courtney Jenkins MD  [facility]   Justa is a 22 year old, presenting for the following health issues:  Hospital F/U      [facility]       Hospital Follow-up Visit:    Hospital/Nursing Home/IP Rehab Facility: [facility]  Date of Admission: 11/30/22  Date of Discharge: 11/30/22  Reason(s) for Admission: chest pain,  (luciano STRAUSS)     Was your hospitalization related to COVID-19? No   Problems taking medications regularly:  None  Medication changes since discharge: None  Problems adhering to non-medication therapy:  None               Visit with patient today to talk about some chest pain as above.    Review of Systems   Constitutional, HEENT, cardiovascular, pulmonary, gi and gu systems are negative, except as otherwise noted.      Objective    Vitals - Patient Reported  Pain Score: No Pain (0)      Vitals:  No vitals were obtained today due to virtual visit.    Physical Exam   healthy, alert and no distress  PSYCH: Alert and oriented times 3; coherent speech, normal   rate and volume, able to articulate logical thoughts, able   to abstract reason, no tangential thoughts, no hallucinations   or delusions  Her affect is normal  RESP: No cough, no audible wheezing, able to talk in full sentences  Remainder of exam unable to be completed due to telephone visits    Past labs reviewed with the patient.   Reviewed outside imaging            Phone call duration: 9 minutes

## 2023-01-30 ENCOUNTER — PATIENT OUTREACH (OUTPATIENT)
Dept: GASTROENTEROLOGY | Facility: CLINIC | Age: 23
End: 2023-01-30
Payer: COMMERCIAL

## 2023-01-30 NOTE — PROGRESS NOTES
Colon Screen Outreach Status: INACTIVE: patient does not require colon screening. Patient had total abdominal colectomy.    Kristal Hua RN Colorectal Cancer   Division of Gastroenterology at [facility]

## 2023-02-10 ENCOUNTER — HOSPITAL ENCOUNTER (OUTPATIENT)
Facility: AMBULATORY SURGERY CENTER | Age: 23
Discharge: HOME | End: 2023-02-10
Attending: PLASTIC SURGERY | Admitting: PLASTIC SURGERY
Payer: COMMERCIAL

## 2023-02-10 VITALS
SYSTOLIC BLOOD PRESSURE: 107 MMHG | DIASTOLIC BLOOD PRESSURE: 68 MMHG | OXYGEN SATURATION: 100 % | RESPIRATION RATE: 16 BRPM | TEMPERATURE: 98.1 F

## 2023-02-10 DIAGNOSIS — M67.40 GANGLION CYST: ICD-10-CM

## 2023-02-10 PROCEDURE — G8907 PT DOC NO EVENTS ON DISCHARG: HCPCS

## 2023-02-10 PROCEDURE — 26160 REMOVE TENDON SHEATH LESION: CPT | Mod: LT | Performed by: PLASTIC SURGERY

## 2023-02-10 PROCEDURE — 88304 TISSUE EXAM BY PATHOLOGIST: CPT | Performed by: PATHOLOGY

## 2023-02-10 PROCEDURE — G8918 PT W/O PREOP ORDER IV AB PRO: HCPCS

## 2023-02-10 PROCEDURE — 26160 REMOVE TENDON SHEATH LESION: CPT | Mod: F2

## 2023-02-10 NOTE — BRIEF OP NOTE
[facility]    Brief Operative Note    Pre-operative diagnosis: Ganglion cyst [M67.40]  Post-operative diagnosis Same as pre-operative diagnosis    Procedure: Procedure(s):  EXCISION, GANGLION CYST, left long finger  Surgeon: Surgeon(s) and Role:     * TONY Wolff MD - Primary  Anesthesia: Local   Estimated Blood Loss: 1 ml    Drains: None  Specimens:   ID Type Source Tests Collected by Time Destination   1 : Left long finger nodule Tissue Finger, Left SURGICAL PATHOLOGY EXAM TONY Wolff MD 2/10/2023 10:08 AM      Findings:   None.  Complications: None.  Implants: * No implants in log *

## 2023-02-10 NOTE — DISCHARGE INSTRUCTIONS
FINGER LESION EXCISION POST-OPERATIVE INSTRUCTIONS    Instructions      Have someone drive you home after surgery and help you at home for 1-2      days.     Get plenty of rest.     Follow balanced diet.     Decreased activity may promote constipation, so you may want to add      more raw fruit to your diet, and be sure to increase fluid intake.     Take pain medication as prescribed. Do not take aspirin or any products      containing aspirin.     Do not drink alcohol when taking pain medications.     Even when not taking pain medications, no alcohol for 3 weeks as it      causes fluid retention.     If you are taking vitamins with iron, resume these as tolerated.     Do not smoke, as smoking delays healing and increases the risk of      complications.    Activities     Do not drive until you are no longer taking any pain medications      (narcotics).     Start walking as soon as possible, this helps to reduce swelling and       lowers the chance of blood clots.     Resume normal activities gradually.     Return to work in 1-2 days, depending upon extent of surgery     No strenuous work or stress on wound for 2-3 weeks.    Incision Care     Keep wrap on for 2-3 days then discontinue. Keep dry until then with plastic bag. After 2-3 days may get wet. Rewrap the wrap if it gets loose or too tight.      Avoid exposing scars to sun for at least 12 months.     Always use a strong sunblock, if sun exposure is unavoidable (SPF 50 or      greater).     Inspect daily for signs of infection.     No tub soaking, bathing, or swimming while sutures or drains are in place.     Keep area clean and dry for first 24 hours.     What to Expect     Some swelling and bruising.     May have slight bleeding from incision.  Apply 4 x 4 gauze with slight pressure to       control bleeding.     Skin grafts and flaps may take several weeks or months to heal; a support garment or      bandage may be necessary for up to a year.    Appearance      Final results of surgery may take a year or more.    Follow-Up Care     If external sutures have been used, they will be removed in 5-14 days.    Please note my office will call you 1-2 business days after your procedure to check up on how you're doing and to schedule your post-operative appointment.        When to Call     If you have increased swelling or bruising.     If swelling and redness persist after a few days.     If you have increased redness along the incision.     If you have severe or increased pain not relieved by medication.     If you have any side effects to medications; such as, rash, nausea,      headache, vomiting.     If you have an oral temperature over 100.4 degrees.     If you have any yellowish or greenish drainage from the incisions or      notice a foul odor.     If you have bleeding from the incisions that is difficult to control with      light pressure.     If you have loss of feeling or motion.    For Medical Questions, Please Call:     [phone], Monday - Friday, 8 a.m. - 4:30 p.m.     After hours and on weekends, call Hospital Paging at [phone] and      ask for the Plastic Surgeon on call.

## 2023-02-10 NOTE — OP NOTE
Procedure Date: 02/10/2023    PREOPERATIVE DIAGNOSIS:  Left long finger retinacular cyst.    POSTOPERATIVE DIAGNOSIS:  Left long finger retinacular cyst.    PROCEDURE:  Left long finger retinacular cyst excision.    SURGEON:  Jin Wolff M.D.     RESIDENT:  Keegan Ramirez M.D.     ANESTHESIA:  Local.    COMPLICATIONS:  Nil.    DRAINS:  Nil.    SPECIMENS:  Left long finger mass.    BLOOD LOSS:  1 mL    DESCRIPTION OF PROCEDURE:  After informed consent was taken from the patient, the proper site and procedure was ascertained with her and she was appropriately marked and taken to the operating room.  She was placed in supine position with the knees comfortably flexed, pillows underneath them.  Her left hand was prepped and draped in standard surgical fashion.  In the preoperative area, I injected 5 mL of 1% lidocaine with 1:100,000 epinephrine mixed 1:1 ratio 0.25% plain Marcaine.  I ensured it was anesthetized and then went ahead and exsanguinated the hand, elevated the tourniquet to 250 mmHg pressure.  I then went ahead and made a transverse incision in the proximal digital crease, dissected bluntly with a scissor down to the tendon sheath.  The mass was clearly visible.  It was a retinacular cyst.  It was excised and sent off the table as specimen.  I let the tourniquet down.  Minimal bleeding was controlled with bipolar cautery.  The wound was closed using 3-0 nylon suture in interrupted horizontal mattress fashion.  A sterile dressing was placed on top.  The patient tolerated the procedure well.  All counts were correct at the end of the case.  The patient was sent to recovery room in stable condition.    TONY Wolff MD        D: 02/10/2023   T: 02/10/2023   MT: ARMANDO    Name:     ALETA BAEZA  MRN:      [id number]        Account:        [id number]   :      2000           Procedure Date: 02/10/2023     Document: [id number]

## 2023-02-13 ENCOUNTER — TELEPHONE (OUTPATIENT)
Dept: PLASTIC SURGERY | Facility: CLINIC | Age: 23
End: 2023-02-13
Payer: COMMERCIAL

## 2023-02-13 NOTE — TELEPHONE ENCOUNTER
S/p Left middle finger mass excision, DOS: 2/10/23    Called Pt and discussed wound care, dressing changes, post op restrictions, and generally what to watch for with sxs of infection. All questions answered at this time. Pt doing well.    Enrike Sylvester RN

## 2023-02-13 NOTE — TELEPHONE ENCOUNTER
[facility] Call Center    Phone Message    May a detailed message be left on voicemail: yes     Reason for Call: Other:     Pt is requesting a call back to discuss what they should expect after the procedure they had on 02/10.    Action Taken: Message routed to:  [facility]astic [facility]    Travel Screening: Not Applicable

## 2023-02-26 NOTE — PROGRESS NOTES
Orthopedic Hand Note  2/27/23  Attending: Dr Wloff     S: Justa presents for follow up. She is doing well. She has had no issues. Intermittent feelings of tingling to volar side of long finger    O:  Gen: well appearing  MSK: SILT med/rad/ulnar dermatomes. Incision well healed, no redness or warmth. Skin edges well approximated. ROM intact to long finger MCP, PIP and DIP with flexion and extension     Path:  Final Diagnosis   Soft tissue, left long finger nodule, excision:  - Ganglion cyst         Assessment:   22 year old female s/p left long finger cyst removal     Plan:   Sutures removed  Continue modified lifting for 2 additional weeks  Scar massage/scar care at 3 weeks

## 2023-02-27 ENCOUNTER — OFFICE VISIT (OUTPATIENT)
Dept: ORTHOPEDICS | Facility: CLINIC | Age: 23
End: 2023-02-27
Payer: COMMERCIAL

## 2023-02-27 DIAGNOSIS — M67.40 GANGLION CYST: Primary | ICD-10-CM

## 2023-02-27 PROCEDURE — 99024 POSTOP FOLLOW-UP VISIT: CPT | Performed by: PHYSICIAN ASSISTANT

## 2023-02-27 NOTE — LETTER
2/27/2023         RE: Wally Varma  [address] [facility] [address]        Dear Colleague,    Thank you for referring your patient, Wally Varma, to the [facility]. Please see a copy of my visit note below.    Orthopedic Hand Note  2/27/23  Attending: Dr Wolff     S: Justa presents for follow up. She is doing well. She has had no issues. Intermittent feelings of tingling to volar side of long finger    O:  Gen: well appearing  MSK: SILT med/rad/ulnar dermatomes. Incision well healed, no redness or warmth. Skin edges well approximated. ROM intact to long finger MCP, PIP and DIP with flexion and extension     Path:  Final Diagnosis   Soft tissue, left long finger nodule, excision:  - Ganglion cyst         Assessment:   22 year old female s/p left long finger cyst removal     Plan:   Sutures removed  Continue modified lifting for 2 additional weeks  Scar massage/scar care at 3 weeks       Again, thank you for allowing me to participate in the care of your patient.        Sincerely,        Sonia Truong PA-C

## 2023-03-15 ENCOUNTER — TELEPHONE (OUTPATIENT)
Dept: GASTROENTEROLOGY | Facility: CLINIC | Age: 23
End: 2023-03-15
Payer: COMMERCIAL

## 2023-03-15 DIAGNOSIS — D13.91 FAP (FAMILIAL ADENOMATOUS POLYPOSIS): Primary | ICD-10-CM

## 2023-03-15 NOTE — TELEPHONE ENCOUNTER
Screening Questions  BLUE  KIND OF PREP RED  LOCATION [review exclusion criteria] GREEN  SEDATION TYPE        y Are you active on mychart?       [place] Ordering/Referring Provider?        [employer] What type of coverage do you have?      n Have you had a positive covid test in the last 14 days?     21.3 1. BMI  [BMI 40+ - review exclusion criteria]    y  2. Are you able to give consent for your medical care? [IF NO,RN REVIEW]          n  3. Are you taking any prescription pain medications on a routine schedule   (ex narcotics: oxycodone, roxicodone, oxycontin,  and percocet)? [RN Review]        n  3a. EXTENDED PREP What kind of prescription?     n 4. Do you have any chemical dependencies such as alcohol, street drugs, or methadone?        **If yes 3- 5 , please schedule with MAC sedation.**          IF YES TO ANY 6 - 10 - HOSPITAL SETTING ONLY.     n 6.   Do you need assistance transferring?     n 7.   Have you had a heart or lung transplant?    n 8.   Are you currently on dialysis?   n 9.   Do you use daily home oxygen?   n 10. Do you take nitroglycerin?   10a. n If yes, how often?     11. [FEMALES]  n Are you currently pregnant?    11a. n If yes, how many weeks? [ Greater than 12 weeks, OR NEEDED]    n 12. Do you have Pulmonary Hypertension? *NEED PAC APPT AT [facility] w/ MAC*     n 13. [review exclusion criteria]  Do you have any implantable devices in your body (pacemaker, defib, LVAD)?    n 14. In the past 6 months, have you had any heart related issues including cardiomyopathy or heart attack?     14a. n If yes, did it require cardiac stenting if so when?     n 15. Have you had a stroke or Transient ischemic attack (TIA - aka  mini stroke ) within 6 months?      n 16. Do you have mod to severe Obstructive Sleep Apnea?  [Hospital only]    n 17. Do you have SEVERE AND UNCONTROLLED asthma? *NEED PAC APPT AT [facility] w/MAC*     18. Are you currently taking any blood thinners?     18a. No. Continue to 19.   18b.     n  "19. Do you take the medication Phentermine?    19a. If yes, \"Hold for 7 days before procedure.  Please consult your prescribing provider if you have questions about holding this medication.\"     n  20. Do you have chronic kidney disease?      n  21. Do you have a diagnosis of diabetes?     n  22. On a regular basis do you go 3-5 days between bowel movements?      23. Preferred LOCAL Pharmacy for Pre Prescription    [ LIST ONLY ONE PHARMACY]        [id number] - [place], [place] - [address] [place] [address] [facility] [address]  [id number] - [place], [place] - [address]       - CLOSING REMINDERS -    Informed patient they will need an adult    Cannot take any type of public or medical transportation alone    Conscious Sedation- Needs  for 6 hours after the procedure       MAC/General-Needs  for 24 hours after procedure    Pre-Procedure Covid test to be completed [[facility] PCR Testing Required]    Confirmed Nurse will call to complete assessment       - SCHEDULING DETAILS -  n Hospital Setting Required? If yes, what is the exclusion?:    Ting  Surgeon    6/14/23  Date of Procedure  FLEXIBLE SIGMOIDOSCOPY [Flex Sig]  [facility]uled  [facility] Type     n PAC / Pre-op Required                 "

## 2023-05-01 NOTE — PATIENT INSTRUCTIONS
If you have any questions regarding your visit, Please contact your care team.     [facility] Services: [phone]  To Schedule an Appointment 24/7  Call: [phone]LEPHONE NUM[facility]   Lorri Raya, ANNAMARIE, APRN, NP-BC    Ale -Surgery Scheduler  Sherry - Surgery Scheduler    Yahaira RN  Micki, RN  Lanette, KIMBERLY        [facility]  [address] [place] [address] [phone] Phone  [phone] Fax    Imaging Scheduling-All Locations [phone]    [facility]  [address] [place] [address]     Urgent Care locations:  [facility] Monday-Friday   10 am - 8 pm  Saturday and Sunday   9 am - 5 pm [phone]   [facility] Labor and Delivery:  [phone]    If you need a medication refill, please contact your pharmacy. Please allow 3 business days for your refill to be completed.  As always, Thank you for trusting us with your healthcare needs!  see additional instructions from your care team below

## 2023-05-01 NOTE — PROGRESS NOTES
SUBJECTIVE:  Wally Varma is an 22 year old, , who presents for an Annual Preventive Exam.   Hx of Familial adenomatous polyposis (FAP) with total colectomy    Concerns patient would like to discuss today:   3 weeks of spotting after intercourse some internal vaginal itch, redness tenderness, increase in vaginal discharge white in color. New sexual partner about 4-6 weeks ago.    GYNECOLOGIC HISTORY:    Her LMP is 2023.   Menstrual cycles are IUD.   She denies intermenstrual bleeding.  Patient reports post-coital bleeding.  Patient is currently sexually active with 1 male partner(s).   Patient denies history of STI or PID.    Patient is currently using Mirena IUD for contraception. Placed in 2022.  She does desire STI testing at her visit today.  Patient denies concerns regarding sexual function including concerns regarding sexual arousal, orgasm, or dyspareunia.   Patient denies problems with urination.  Patient denies bowel problems.      Last PAP: 2022   Next PAP due: 2023  History of abnormal Pap smear: YES  22 LSIL pap. Plan: pap in 1 year      Mammogram current: not applicable  Regular self breast exam: No  Personal history of breast cancer: No  Family history of breast cancer: Yes: MGGma , gmaunt    Personal history of uterine, ovarian, or colon cancer: No  Family history of uterine, ovarian, or colon cancer: No    HEALTH MAINTENANCE HISTORY:    Last Cholesterol:  No results found for: CHOL    Last TSH:  Lab Results   Component Value Date    TSH 2.16 2021     Are immunizations up to date: Yes    SOCIAL HISTORY:  Have you had an eye exam in the last 2 years? Yes  Do you see your dentist at least 1 time per year? Yes  Do you consume dairy products? Yes  Do you consume meat products? Yes    Do you exercise regularly? Yes    Patient denies tobacco use.  Patient reports rare alcohol use.    Last PHQ-2 score on record:      2023     8:41 AM 2022    12:22 PM    PHQ-2 ( 1999 Pfizer)   Q1: Little interest or pleasure in doing things 0 0   Q2: Feeling down, depressed or hopeless 0 0   PHQ-2 Score 0 0   Q1: Little interest or pleasure in doing things Not at all Not at all   Q2: Feeling down, depressed or hopeless Not at all Not at all   PHQ-2 Score 0 0      Last PHQ-9 score on record:       7/19/2020     7:26 AM   PHQ-9 SCORE   PHQ-9 Total Score MyChart 7 (Mild depression)   PHQ-9 Total Score 7     Last GAD7 score on record:        View : No data to display.                Do you feel safe where you live? Yes  Do you wear a seatbelt when riding or driving in a car? Yes    HISTORY:  Prescription Medications as of 5/5/2023       Rx Number Disp Refills Start End Last Dispensed Date Next Fill Date Owning Pharmacy    albuterol (PROAIR HFA/PROVENTIL HFA/VENTOLIN HFA) 108 (90 Base) MCG/ACT inhaler  8.5 g 2 9/21/2021    [facility] [address] [place], [place] [id number] [facility] [address]    Sig: Inhale 2 puffs into the lungs every 4 hours as needed (Wheeze or cough)    Class: E-Prescribe    Notes to Pharmacy: Pharmacy may dispense brand covered by insurance (Proair, or proventil or ventolin or generic albuterol inhaler)    Route: Inhalation    loperamide (IMODIUM A-D) 2 MG tablet  60 tablet 5 6/10/2022    [facility] [address] [place] [address] [id number] AT [facility] [address]    Sig: Take 1 tablet (2 mg) by mouth 2 times daily    Class: E-Prescribe    Route: Oral    sulindac (CLINORIL) 150 MG tablet  90 tablet 11 6/10/2022    [facility] [address] [place] [address] [id number] AT [facility] [address]    Sig: Take 1 tablet (150 mg) by mouth 2 times daily    Class: E-Prescribe    Route: Oral        Allergies   Allergen Reactions     Latex Hives     Immunization History   Administered Date(s) Administered     DTAP (<7y) 1998, 1998, 1998, 11/23/1999, 08/27/2003     HIB(PRP-OMP)(PedvaxHIB) 1998, 1998,  1999     Hepatits B (Peds <19Y) 1998, 1998, 1999     MMR 1999, 2002, 2003     Meningococcal ACWY (Menactra ) 2010     Meningococcal ACWY (Menveo ) 2016     Poliovirus, inactivated (IPV) 1998, 1998, 1999, 2003     TDAP Vaccine (Adacel) 2010, 2016     Varicella 1999, 2010       OB History    Para Term  AB Living   0 0 0 0 0 0   SAB IAB Ectopic Multiple Live Births   0 0 0 0 0     Past Medical History:   Diagnosis Date     Familial polyposis      Past Surgical History:   Procedure Laterality Date     COLONOSCOPY       COLONOSCOPY N/A 2020    Procedure: Colonoscopy, With Polypectomy And Biopsy;  Surgeon: Nathan Hernandez MD;  [facility]: [facility]     COLONOSCOPY WITH CO2 INSUFFLATION N/A 2020    Procedure: COLONOSCOPY, WITH CO2 INSUFFLATION;  Surgeon: Nathan Hernandez MD;  [facility]     COMBINED ESOPHAGOSCOPY, GASTROSCOPY, DUODENOSCOPY (EGD) WITH CO2 INSUFFLATION N/A 2020    Procedure: ESOPHAGOGASTRODUODENOSCOPY, WITH CO2 INSUFFLATION;  Surgeon: Nathan Hernandez MD;  Location[facility]     ENDOSCOPY       ESOPHAGOSCOPY, GASTROSCOPY, DUODENOSCOPY (EGD), COMBINED N/A 2020    Procedure: Esophagogastroduodenoscopy, With Biopsy;  Surgeon: Nathan Hernandez MD;  Location[facility]     EXCISE GANGLION HAND Left 2/10/2023    Procedure: EXCISION, GANGLION CYST, left long finger;  Surgeon: TONY Wolff MD;  Location: [facility]     HEAD & NECK SURGERY  5 years ago    bump  that was removed     LAPAROSCOPIC ASSISTED COLECTOMY N/A 2021    Procedure: Laparoscopic assisted total abdominal colectomy with ileorectal anastomosis, takedown of splenic flexure;  Surgeon: Wade Maguire MD;  [facility]     OMENTECTOMY N/A 2021    Procedure: PARTIAL OMENTECTOMY;  Surgeon: Wade Maguire MD;  [facility]     SIGMOIDOSCOPY FLEXIBLE N/A  04/22/2021    Procedure: SIGMOIDOSCOPY, FLEXIBLE;  Surgeon: Wade Maguire MD;  [facility]     TONSILLECTOMY       Family History   Problem Relation Age of Onset     Hypertension Mother      Hypertension Maternal Grandfather      Deep Vein Thrombosis (DVT) Maternal Grandfather      Social History     Socioeconomic History     Marital status: Single     Spouse name: None     Number of children: None     Years of education: None     Highest education level: None   Tobacco Use     Smoking status: Never     Smokeless tobacco: Never   Vaping Use     Vaping status: Never Used   Substance and Sexual Activity     Alcohol use: Not Currently     Comment: rare     Drug use: Never     Sexual activity: Not Currently     Partners: Male     Birth control/protection: I.U.D.   Other Topics Concern     Parent/sibling w/ CABG, MI or angioplasty before 65F 55M? No      reports that she has never smoked. She has never used smokeless tobacco.    REVIEW of SYSTEMS:  ROS: 10 point ROS neg other than the symptoms noted above in the HPI.    EXAM:  Blood pressure 129/79, weight 56.6 kg (124 lb 11.2 oz), not currently breastfeeding.   Body mass index is 21.55 kg/m .  General - pleasant female in no acute distress.  Skin - no suspicious lesions or rashes  EENT-  PERRLA, euthyroid with out palpable nodules  Neck - supple without lymphadenopathy.  Lungs - clear to auscultation bilaterally.  Heart - regular rate and rhythm without murmur.  Abdomen - soft, nontender, nondistended, no masses or organomegaly noted.  Musculoskeletal - no gross deformities.  Neurological - normal strength, sensation, and mental status.    Breast Exam:  Breast: Without visible skin changes. No dimpling or lesions seen.   Breasts supple, non-tender with palpation, no dominant mass, nodularity, or nipple discharge noted bilaterally. Axillary nodes negative.      Pelvic Exam:  EG/BUS: Normal genital architecture without lesions, erythema or abnormal  secretions Bartholin's, Urethra, [place]'s normal   Urethral meatus: normal   Urethra: no masses, tenderness, or scarring   Bladder: no masses or tenderness   Vagina: moist, pink, rugae with yellow, blood tinged amine odor  secretions  Cervix: no lesions, pink, moist, closed, without lesion or CMT and IUD strings at external os.  Uterus: anteverted,  and small, smooth, firm, mobile w/o pain  Adnexa: Within normal limits and No masses, nodularity, tenderness  Rectum: deferred       ASSESSMENT/PLAN:     (Z01.419) Well woman exam with routine gynecological exam  (primary encounter diagnosis)  (Z11.3) Routine screening for STI (sexually transmitted infection)  (Z12.4) Cervical cancer screening  Comment: LSIL PAP in 8/2022, 1 year follow-up had been recommended. New sexual partner.  Plan: NEISSERIA GONORRHOEA PCR, CHLAMYDIA TRACHOMATIS        PCR, Pap imaged thin layer screen only -         recommended age 21 - 24 year      (N94.9) Vaginal burning  (N89.8) Pruritus of vagina  Comment: 3 weeks of vaginal itching, spotting and odor. Began sexual relationship with a new partner 4-6 weeks ago. Positive for clue cells, amine odor and thin yellow-white vagiinal discharge. Plan to treat for BV.  Plan: NEISSERIA GONORRHOEA PCR, CHLAMYDIA TRACHOMATIS        PCR, UA Macroscopic with reflex to Microscopic         and Culture, Wet prep - Clinic Collect,         metroNIDAZOLE (FLAGYL) 500 MG tablet    Additional teaching done at this visit regarding calcium (1200 mg per day) and birth control.      STD testing offered?  Accepted    Diet: Specific dietary guidelines may vary, but in general, a healthy diet includes intake of fruits, vegetables, legumes, nuts, and whole grains each day. A healthy diet includes a variety of protein-rich foods which might include lean cuts of meat (turkey, chicken), tofu, legumes, beans, nuts, seeds, dairy, and some fish. You should limit or avoid red and processed meats, unhealthy fats (saturated and  "trans-fats), sugar, sodium, and alcohol.      Exercise: The American Heart Association recommends you get at least 150 minutes of moderate-intensity aerobic exercise per week, spread across several days. They also recommend women participate in strength-training exercises (such as resistance or weights) 2 days each week.    Calcium and Vitamin D: We recommend you try to get 7808-1605 mg of calcium (total of diet and supplement) and 600-800 international units of vitamin D daily. We recommend 3 servings of calcium and vitamin D-rich foods daily to achieve this.  Good sources of calcium include:    Milk, yogurt, and cheese    Certain green leafy vegetables, such as kale, spinach, bok kalie, and alan greens    Fish with bones, such as canned salmon, mackerel, and sardines    Tofu made with calcium carbonate (not the type of tofu called nagiri)    Drinks that have calcium added, such as some orange juice and rice and soy drinks  Good dietary sources of include:    Milk, fortified with Vitamin D    Health Maintenance  The following topics were discussed or recommended:  - Kegel exercises  - Seat belt use, helmet use, and sunscreen use  - Routine vision screening  - Twice-annual dental visits  - Mammogram, per guidelines and provider recommendations  - Cervical Cancer Screenings (\"PAP Smear\") per guidelines and provider recommendations  - Colon Cancer Screenings at age 45 (may be recommended earlier if you have a family history of colon cancer/disease)  - Calcium/Vitamin D supplement: Recommend 2813-1080 mg of calcium daily and 600-800 IU of vitamin D (total of diet and supplement).  - Folic Acid 400 - 800 mcg daily for women of childbearing age to prevent neural tube defects       Return to clinic in one year.  Follow-up as needed.      LUIS Velasquez CNP [facility]  "

## 2023-05-05 ENCOUNTER — OFFICE VISIT (OUTPATIENT)
Dept: OBGYN | Facility: CLINIC | Age: 23
End: 2023-05-05
Payer: COMMERCIAL

## 2023-05-05 VITALS — BODY MASS INDEX: 21.55 KG/M2 | WEIGHT: 124.7 LBS | SYSTOLIC BLOOD PRESSURE: 129 MMHG | DIASTOLIC BLOOD PRESSURE: 79 MMHG

## 2023-05-05 DIAGNOSIS — N89.8 PRURITUS OF VAGINA: ICD-10-CM

## 2023-05-05 DIAGNOSIS — Z01.419 WELL WOMAN EXAM WITH ROUTINE GYNECOLOGICAL EXAM: Primary | ICD-10-CM

## 2023-05-05 DIAGNOSIS — Z12.4 CERVICAL CANCER SCREENING: ICD-10-CM

## 2023-05-05 DIAGNOSIS — Z11.3 ROUTINE SCREENING FOR STI (SEXUALLY TRANSMITTED INFECTION): ICD-10-CM

## 2023-05-05 DIAGNOSIS — N94.89 VAGINAL BURNING: ICD-10-CM

## 2023-05-05 LAB
CLUE CELLS: PRESENT
TRICHOMONAS, WET PREP: ABNORMAL
WBC'S/HIGH POWER FIELD, WET PREP: ABNORMAL
YEAST, WET PREP: ABNORMAL

## 2023-05-05 PROCEDURE — G0124 SCREEN C/V THIN LAYER BY MD: HCPCS | Performed by: PATHOLOGY

## 2023-05-05 PROCEDURE — G0145 SCR C/V CYTO,THINLAYER,RESCR: HCPCS

## 2023-05-05 PROCEDURE — 87591 N.GONORRHOEAE DNA AMP PROB: CPT

## 2023-05-05 PROCEDURE — 87491 CHLMYD TRACH DNA AMP PROBE: CPT

## 2023-05-05 PROCEDURE — 99395 PREV VISIT EST AGE 18-39: CPT

## 2023-05-05 PROCEDURE — 99214 OFFICE O/P EST MOD 30 MIN: CPT | Mod: 25

## 2023-05-05 PROCEDURE — 87210 SMEAR WET MOUNT SALINE/INK: CPT

## 2023-05-05 RX ORDER — METRONIDAZOLE 500 MG/1
500 TABLET ORAL 2 TIMES DAILY
Qty: 14 TABLET | Refills: 0 | Status: SHIPPED | OUTPATIENT
Start: 2023-05-05 | End: 2023-05-12

## 2023-05-06 LAB
C TRACH DNA SPEC QL NAA+PROBE: NEGATIVE
N GONORRHOEA DNA SPEC QL NAA+PROBE: NEGATIVE

## 2023-05-10 LAB
BKR LAB AP GYN ADEQUACY: ABNORMAL
BKR LAB AP GYN INTERPRETATION: ABNORMAL
BKR LAB AP HPV REFLEX: NO
BKR LAB AP PREVIOUS ABNL DX: ABNORMAL
BKR LAB AP PREVIOUS ABNORMAL: ABNORMAL
PATH REPORT.COMMENTS IMP SPEC: ABNORMAL
PATH REPORT.COMMENTS IMP SPEC: ABNORMAL
PATH REPORT.RELEVANT HX SPEC: ABNORMAL

## 2023-05-11 ENCOUNTER — PATIENT OUTREACH (OUTPATIENT)
Dept: OBGYN | Facility: CLINIC | Age: 23
End: 2023-05-11
Payer: COMMERCIAL

## 2023-05-11 PROBLEM — R87.612 LOW GRADE SQUAMOUS INTRAEPITHELIAL LESION (LGSIL) ON CERVICAL PAP SMEAR: Status: ACTIVE | Noted: 2022-08-29

## 2023-05-11 NOTE — RESULT ENCOUNTER NOTE
LSIL pap in patient < 26 yo sent through My Chart results.   She will be due for next pap in 12 mo.     RAMESH Asher, RN - Pap Tracking Nurse

## 2023-05-31 ENCOUNTER — TELEPHONE (OUTPATIENT)
Dept: GASTROENTEROLOGY | Facility: CLINIC | Age: 23
End: 2023-05-31
Payer: COMMERCIAL

## 2023-05-31 NOTE — TELEPHONE ENCOUNTER
"Wade Maguire MD Sewill, Samantha, RN; Micki Ibarra, RN  She has an ileosigmoid anastomosis so full prep given that historically she has very poor prep with enemas alone. Its still considered a Flex Sig. Thx.           Previous Messages       ----- Message -----   From: July Gunter RN   Sent: 5/31/2023   8:25 AM CDT   To: Wade Maguire MD; Micki Ibarra RN; *   Subject: 6.14.2023 flex sig                               Dr. Maguire please review and advise:     Patient is scheduled for a Flexible sigmoidoscopy   Date of procedure: 6.14.2023     Reason for review: 7.20.2022 flexible sigmoidoscopy report stated \"poor prep\" and \"colonoscopy in 1 year.\"     Does patient need a flex sig or a colonoscopy?     If a flexible sigmoidoscopy, should patient continue with fleet enemas or a full bowel prep d/t poor past prep w/ flex sig?       Thank you,   July Gunter RN   Endoscopy Procedure Pre Assessment RN   [phone] option 4     "

## 2023-05-31 NOTE — TELEPHONE ENCOUNTER
"Per Dr. Maguire \"Sure. She doesn't have much colon so just take until the output is clear yellow/green.\"    Pt informed of message above.     Reviewed miralax prep without mag citrate instructions.     Patient verbalized understanding and had no questions or concerns at this time.    July Gunter, RN  Endoscopy Procedure Pre Assessment RN        "

## 2023-05-31 NOTE — TELEPHONE ENCOUNTER
Pre assessment questions completed for upcoming Flexible sigmoidoscopy  procedure scheduled on 6.14.2023    COVID policy reviewed.     Reviewed procedural arrival time 0700 and facility location [facility]; [address], [place] [address]    Designated  policy reviewed. Instructed to have someone stay 24 hours post procedure.     NSAIDs? Yes.  Sulindac (Clinoril).  Holding interval of 4 days.    Anticoagulation/blood thinners? No    Electronic implanted devices? No    Clarifying if okay to take miralax bowel prep vs extended prep.     July Gunter RN  Endoscopy Procedure Pre Assessment RN

## 2023-05-31 NOTE — TELEPHONE ENCOUNTER
Patient scheduled for Flexible sigmoidoscopy  on 6.14.2023.     Discuss Covid policy.     Pre op exam needed? N/A    Arrival time: 0700. Procedure time 0800    Facility location: [facility]; [address] [place] [address] [address], [place] [address]    Sedation type: MAC    NSAIDs? Yes.  Sulindac (Clinoril).  Holding interval of 4 days.    Anticoagulations? No    Electronic implanted devices? No    Diabetic? No    Indication for procedure: FAP    Bowel prep recommendation: Staff message to Dr. Maguire to clarify d/t poor prep with past flex sigs.    Pre visit planning completed.    July Gunter RN  Endoscopy Procedure Pre Assessment RN

## 2023-06-14 ENCOUNTER — LAB REQUISITION (OUTPATIENT)
Dept: LAB | Facility: CLINIC | Age: 23
End: 2023-06-14
Payer: COMMERCIAL

## 2023-06-14 ENCOUNTER — TRANSFERRED RECORDS (OUTPATIENT)
Dept: HEALTH INFORMATION MANAGEMENT | Facility: CLINIC | Age: 23
End: 2023-06-14
Payer: COMMERCIAL

## 2023-06-14 PROCEDURE — 88305 TISSUE EXAM BY PATHOLOGIST: CPT | Mod: 26 | Performed by: PATHOLOGY

## 2023-06-14 PROCEDURE — 88305 TISSUE EXAM BY PATHOLOGIST: CPT | Mod: TC,ORL | Performed by: COLON & RECTAL SURGERY

## 2023-06-20 ENCOUNTER — HOSPITAL ENCOUNTER (OUTPATIENT)
Facility: CLINIC | Age: 23
End: 2023-06-20
Attending: INTERNAL MEDICINE | Admitting: INTERNAL MEDICINE
Payer: COMMERCIAL

## 2023-06-20 ENCOUNTER — TELEPHONE (OUTPATIENT)
Dept: GASTROENTEROLOGY | Facility: CLINIC | Age: 23
End: 2023-06-20
Payer: COMMERCIAL

## 2023-06-20 DIAGNOSIS — D13.91 FAP (FAMILIAL ADENOMATOUS POLYPOSIS): ICD-10-CM

## 2023-06-20 RX ORDER — LOPERAMIDE HYDROCHLORIDE 2 MG/1
2 TABLET ORAL 2 TIMES DAILY
Qty: 60 TABLET | Refills: 5 | Status: SHIPPED | OUTPATIENT
Start: 2023-06-20 | End: 2023-06-20

## 2023-06-20 RX ORDER — LOPERAMIDE HYDROCHLORIDE 2 MG/1
2 TABLET ORAL 2 TIMES DAILY
Qty: 60 TABLET | Refills: 11 | Status: SHIPPED | OUTPATIENT
Start: 2023-06-20

## 2023-06-20 NOTE — TELEPHONE ENCOUNTER
"Endoscopy Scheduling Screen    Have you had a positive Covid test in the last 14 days?  No    Are you active on MyChart?   Yes    What insurance is in the chart?  Other:  [facility]    Ordering/Referring Provider: JOHN   (If ordering provider performs procedure, schedule with ordering provider unless otherwise instructed. )    BMI: Estimated body mass index is 21.55 kg/m  as calculated from the following:    Height as of 11/15/22: 1.62 m (5' 3.78\").    Weight as of 5/5/23: 56.6 kg (124 lb 11.2 oz).     Sedation Ordered  moderate sedation.   If patient BMI > 50 do not schedule in ASC.    Are you taking any prescription medications for pain?   No    Are you taking methadone or Suboxone?  No    Do you have a history of malignant hyperthermia or adverse reaction to anesthesia?  No    (Females) Are you currently pregnant?   No     Have you been diagnosed or told you have pulmonary hypertension?   No    Do you have an LVAD?  No    Have you been told you have moderate to severe sleep apnea?  No    Have you been told you have COPD, asthma, or any other lung disease?  No    Do you have any heart conditions?  No     Have you ever had or are you awaiting a heart or lung transplant?   No    Have you had a stroke or transient ischemic attack (TIA aka \"mini stroke\" in the last 6 months?   No    Have you been diagnosed with or been told you have cirrhosis of the liver?   No    Are you currently on dialysis?   No    Do you need assistance transferring?   No    BMI: Estimated body mass index is 21.55 kg/m  as calculated from the following:    Height as of 11/15/22: 1.62 m (5' 3.78\").    Weight as of 5/5/23: 56.6 kg (124 lb 11.2 oz).     Is patients BMI > 40 and scheduling location [facility]?  No    Do you take the medication Phentermine, Ozempic or Wegovy?  No    Do you take the medication Naltrexone?  No    Do you take blood thinners?  No    Preferred Pharmacy:    [id number] [place], [place] - [address] AT [facility] [address]  [address] [place] [address]  Phone: [phone] Fax: [phone]    Prep   Are you currently on dialysis or do you have chronic kidney disease?  No (standard prep)    Do you have a diagnosis of diabetes?  No    Do you have a diagnosis of cystic fibrosis (CF)?  No    On a regular basis do you go 3 -5 days between bowel movements?  No    BMI > 40?  No    Final Scheduling Details   Colonoscopy prep sent?  N/A    Procedure scheduled  FLEX SIG    Surgeon:  RAY BAUER     Date of procedure:  8/31     Schedule PAC:   No    Location  [facility]    Sedation   Moderate Sedation    Patient Reminders:    You will receive a call from a Nurse to review instructions and health history.  This assessment must be completed prior to your procedure.  Failure to complete the Nurse assessment may result in the procedure being cancelled.       On the day of your procedure, please designate an adult(s) who can drive you home stay with you for the next 24 hours. The medicines used in the exam will make you sleepy. You will not be able to drive.       You cannot take public transportation, ride share services, or non-medical taxi service without a responsible caregiver.  Medical transport services are allowed with the requirement that a responsible caregiver will receive you at your destination.  We require that drivers and caregivers are confirmed prior to your procedure.

## 2023-06-20 NOTE — TELEPHONE ENCOUNTER
Requested Prescriptions   Pending Prescriptions Disp Refills     loperamide (IMODIUM A-D) 2 MG tablet 60 tablet 5     Sig: Take 1 tablet (2 mg) by mouth 2 times daily       There is no refill protocol information for this order        Last Written Prescription Date:  6/10/2022  Last Fill Quantity: 60,  # refills: 5   Last office visit: 6/10/2022 ; last virtual visit: Visit date not found with prescribing provider:     Future Office Visit:   Next 5 appointments (look out 90 days)    Jul 26, 2023  4:00 PM  (Arrive by 3:40 PM)  Adult Preventative Visit with Courtney Jenkins MD  [facility] ([facility] [address] [place] [id number] [phone]             Kristopher Juarez MA

## 2023-07-12 ENCOUNTER — TELEPHONE (OUTPATIENT)
Dept: GASTROENTEROLOGY | Facility: CLINIC | Age: 23
End: 2023-07-12
Payer: COMMERCIAL

## 2023-07-12 NOTE — TELEPHONE ENCOUNTER
Caller:   Reason for Reschedule/Cancellation (please be detailed, any staff messages or encounters to note?): PATIENT HAS FLEX SIG ALREADY AND WAS SCHEDULED FOR ANOTHER.       Prior to reschedule please review:    Ordering Provider: RAFAELA LOPEZ    Sedation per order: CS    Does patient have any ASC Exclusions, please identify?:       Notes on Cancelled Procedure:    Procedure: FLEXIBLE SIGMOIDOSCOPY [Flex Sig]     Date: 8/31    Location: [facility]; [address], [place] [address]    Surgeon: JUANCARLOS      Rescheduled: No  Send In - basket message to Panc - Osvaldo Pool if EUS  procedure is canceled or rescheduled: [ N/A, YES or NO]

## 2023-10-04 ASSESSMENT — ENCOUNTER SYMPTOMS
BREAST MASS: 0
HEADACHES: 0
CHILLS: 0
HEMATOCHEZIA: 0
JOINT SWELLING: 0
NAUSEA: 0
DIZZINESS: 0
HEMATURIA: 0
FREQUENCY: 0
SHORTNESS OF BREATH: 0
PARESTHESIAS: 0
PALPITATIONS: 0
MYALGIAS: 1
WEAKNESS: 0
EYE PAIN: 0
DIARRHEA: 0
COUGH: 0
CONSTIPATION: 0
FEVER: 0
ARTHRALGIAS: 1
SORE THROAT: 0
DYSURIA: 0
ABDOMINAL PAIN: 0

## 2023-10-10 ENCOUNTER — OFFICE VISIT (OUTPATIENT)
Dept: FAMILY MEDICINE | Facility: CLINIC | Age: 23
End: 2023-10-10
Payer: COMMERCIAL

## 2023-10-10 VITALS
HEART RATE: 85 BPM | RESPIRATION RATE: 16 BRPM | SYSTOLIC BLOOD PRESSURE: 117 MMHG | HEIGHT: 65 IN | WEIGHT: 140.4 LBS | TEMPERATURE: 97.1 F | BODY MASS INDEX: 23.39 KG/M2 | OXYGEN SATURATION: 100 % | DIASTOLIC BLOOD PRESSURE: 76 MMHG

## 2023-10-10 DIAGNOSIS — M79.642 PAIN OF LEFT HAND: ICD-10-CM

## 2023-10-10 DIAGNOSIS — Z13.220 SCREENING CHOLESTEROL LEVEL: ICD-10-CM

## 2023-10-10 DIAGNOSIS — Z00.00 ROUTINE GENERAL MEDICAL EXAMINATION AT A HEALTH CARE FACILITY: Primary | ICD-10-CM

## 2023-10-10 DIAGNOSIS — Z13.1 DIABETES MELLITUS SCREENING: ICD-10-CM

## 2023-10-10 LAB
CHOLEST SERPL-MCNC: 136 MG/DL
FASTING STATUS PATIENT QL REPORTED: YES
GLUCOSE SERPL-MCNC: 95 MG/DL (ref 70–99)
HDLC SERPL-MCNC: 59 MG/DL
LDLC SERPL CALC-MCNC: 71 MG/DL
NONHDLC SERPL-MCNC: 77 MG/DL
TRIGL SERPL-MCNC: 30 MG/DL

## 2023-10-10 PROCEDURE — 99395 PREV VISIT EST AGE 18-39: CPT | Performed by: FAMILY MEDICINE

## 2023-10-10 PROCEDURE — 80061 LIPID PANEL: CPT | Performed by: FAMILY MEDICINE

## 2023-10-10 PROCEDURE — 36415 COLL VENOUS BLD VENIPUNCTURE: CPT | Performed by: FAMILY MEDICINE

## 2023-10-10 PROCEDURE — 82947 ASSAY GLUCOSE BLOOD QUANT: CPT | Performed by: FAMILY MEDICINE

## 2023-10-10 ASSESSMENT — ENCOUNTER SYMPTOMS
NAUSEA: 0
HEMATURIA: 0
DYSURIA: 0
FEVER: 0
SHORTNESS OF BREATH: 0
WEAKNESS: 0
BREAST MASS: 0
JOINT SWELLING: 0
PARESTHESIAS: 0
ARTHRALGIAS: 1
CONSTIPATION: 0
PALPITATIONS: 0
HEADACHES: 0
CHILLS: 0
DIZZINESS: 0
MYALGIAS: 1
EYE PAIN: 0
HEMATOCHEZIA: 0
FREQUENCY: 0
COUGH: 0
DIARRHEA: 0
ABDOMINAL PAIN: 0
SORE THROAT: 0

## 2023-10-10 ASSESSMENT — PAIN SCALES - GENERAL: PAINLEVEL: MODERATE PAIN (4)

## 2023-10-10 NOTE — PROGRESS NOTES
SUBJECTIVE:   CC: Justa is an 23 year old who presents for preventive health visit.        No data to display                Healthy Habits:     Getting at least 3 servings of Calcium per day:  Yes    Bi-annual eye exam:  NO    Dental care twice a year:  Yes    Sleep apnea or symptoms of sleep apnea:  None    Diet:  Regular (no restrictions)    Frequency of exercise:  None    Taking medications regularly:  No    Barriers to taking medications:  Problems remembering to take them    Medication side effects:  None    Additional concerns today:  No            Social History     Tobacco Use    Smoking status: Never    Smokeless tobacco: Never   Substance Use Topics    Alcohol use: Not Currently     Comment: rare             10/4/2023     8:03 AM   Alcohol Use   Prescreen: >3 drinks/day or >7 drinks/week? No          No data to display              Reviewed orders with patient.  Reviewed health maintenance and updated orders accordingly - Yes  Lab work is in process  Labs reviewed in EPIC  BP Readings from Last 3 Encounters:   10/10/23 117/76   05/05/23 129/79   02/10/23 107/68    Wt Readings from Last 3 Encounters:   10/10/23 63.7 kg (140 lb 6.4 oz)   05/05/23 56.6 kg (124 lb 11.2 oz)   11/15/22 58.2 kg (128 lb 3.2 oz)                    Breast Cancer Screening:  Any new diagnosis of family breast, ovarian, or bowel cancer? No    FHS-7:       10/10/2023     8:22 AM   Breast CA Risk Assessment (FHS-7)   Did any of your first-degree relatives have breast or ovarian cancer? No   Did any of your relatives have bilateral breast cancer? Unknown   Did any man in your family have breast cancer? No   Did any woman in your family have breast and ovarian cancer? Yes   Did any woman in your family have breast cancer before age 50 y? Unknown   Do you have 2 or more relatives with breast and/or ovarian cancer? No   Do you have 2 or more relatives with breast and/or bowel cancer? No       Patient under 40 years of age: Routine  Mammogram Screening not recommended.   Pertinent mammograms are reviewed under the imaging tab.    History of abnormal Pap smear: NO - age 21-29 PAP every 3 years recommended      5/5/2023    11:31 AM 8/22/2022    10:43 AM   PAP / HPV   PAP Low-grade squamous intraepithelial lesion (LSIL) encompassing HPV/mild dysplasia/CIN1  Low-grade squamous intraepithelial lesion (LSIL) encompassing HPV/mild dysplasia/CIN1      Reviewed and updated as needed this visit by clinical staff   Tobacco  Allergies  Meds  Problems  Med Hx  Surg Hx  Fam Hx          Reviewed and updated as needed this visit by Provider   Tobacco  Allergies  Meds  Problems  Med Hx  Surg Hx  Fam Hx         Here today for routine checkup.  Generally doing well overall but still dealing with some pain involving the right thumb/hand.  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  Was seen through the ER about a month ago.  X-rays negative.  They gave her a splint that she wears on occasion.    Review of Systems   Constitutional:  Negative for chills and fever.   HENT:  Negative for congestion, ear pain, hearing loss and sore throat.    Eyes:  Negative for pain and visual disturbance.   Respiratory:  Negative for cough and shortness of breath.    Cardiovascular:  Negative for chest pain, palpitations and peripheral edema.   Gastrointestinal:  Negative for abdominal pain, constipation, diarrhea, hematochezia and nausea.   Breasts:  Negative for tenderness, breast mass and discharge.   Genitourinary:  Negative for dysuria, frequency, genital sores, hematuria, pelvic pain, urgency, vaginal bleeding and vaginal discharge.   Musculoskeletal:  Positive for arthralgias and myalgias. Negative for joint swelling.   Skin:  Negative for rash.   Neurological:  Negative for dizziness, weakness, headaches and paresthesias.   Psychiatric/Behavioral:  Negative for mood changes.         OBJECTIVE:   /76 (BP Location: Right arm, Patient Position: Sitting,  "Cuff Size: Adult Regular)   Pulse 85   Temp 97.1  F (36.2  C) (Temporal)   Resp 16   Ht 1.638 m (5' 4.5\")   Wt 63.7 kg (140 lb 6.4 oz)   LMP 08/03/2023 (Approximate)   SpO2 100%   BMI 23.73 kg/m    Physical Exam  GENERAL: healthy, alert and no distress  EYES: Eyes grossly normal to inspection, PERRL and conjunctivae and sclerae normal  HENT: ear canals and TM's normal, nose and mouth without ulcers or lesions  NECK: no adenopathy, no asymmetry, masses, or scars and thyroid normal to palpation  RESP: lungs clear to auscultation - no rales, rhonchi or wheezes  CV: regular rate and rhythm, normal S1 S2, no S3 or S4, no murmur, click or rub, no peripheral edema and peripheral pulses strong  ABDOMEN: soft, nontender, no hepatosplenomegaly, no masses and bowel sounds normal  MS: no gross musculoskeletal defects noted, no edema  SKIN: no suspicious lesions or rashes  NEURO: Normal strength and tone, mentation intact and speech normal  PSYCH: mentation appears normal, affect normal/bright    Diagnostic Test Results:  Labs reviewed in Epic    ASSESSMENT/PLAN:   (Z00.00) Routine general medical examination at a health care facility  (primary encounter diagnosis)  Comment: Routine health maintenance otherwise up-to-date.  Discussed potential vaccinations  Plan:     (V14.269) Pain of left hand  Comment: Likely related to ongoing use but this is something that needs to continue.  Not an option to shut things down.  Would like to get her in with hand therapy.  Can also try some topical Voltaren gel  Plan: diclofenac (VOLTAREN) 1 % topical gel,         Occupational Therapy Referral            (Z13.220) Screening cholesterol level  Comment:   Plan: Lipid panel reflex to direct LDL Fasting            (Z13.1) Diabetes mellitus screening  Comment:   Plan: Glucose            Patient has been advised of split billing requirements and indicates understanding: Yes      COUNSELING:  Reviewed preventive health counseling, as " reflected in patient instructions       Regular exercise       Healthy diet/nutrition       Vision screening        She reports that she has never smoked. She has never used smokeless tobacco.          Courtney Jenkins MD  [facility]

## 2023-12-15 ENCOUNTER — PATIENT OUTREACH (OUTPATIENT)
Dept: CARE COORDINATION | Facility: CLINIC | Age: 23
End: 2023-12-15
Payer: COMMERCIAL

## 2023-12-15 NOTE — PROGRESS NOTES
Clinic Care Coordination Contact  Program:  [facility]: [place]   Renewal: [employer]   Date Applied:     FRW Outreach:   12/15/23: Patient called CTA back and stated patient doesn't have [facility] insurance anymore.   Bernadine Carpio  Care   [facility] Care Coordination  [phone]    12/15/23: 1st outreach attempt. Left a message on voicemail with call back information and requested return call.  Plan: CTA will call again within 2 weeks.   Bernadine Carpio  Care   [facility] Care Coordination  [phone]      Health Insurance:      Referral/Screening:

## 2023-12-15 NOTE — PROGRESS NOTES
Clinic Care Coordination Contact  Program:  [facility]: [place]   Renewal: [employer]   Date Applied:     DORIAN Outreach:   12/15/23: 1st outreach attempt. Left a message on voicemail with call back information and requested return call.  Plan: CTA will call again within 2 weeks.   Bernadine Carpio  Care   [facility]  Clinic Care Coordination  [phone]      Health Insurance:      Referral/Screening:

## 2023-12-19 ENCOUNTER — OFFICE VISIT (OUTPATIENT)
Dept: URGENT CARE | Facility: URGENT CARE | Age: 23
End: 2023-12-19
Payer: COMMERCIAL

## 2023-12-19 VITALS
SYSTOLIC BLOOD PRESSURE: 122 MMHG | HEART RATE: 82 BPM | DIASTOLIC BLOOD PRESSURE: 75 MMHG | TEMPERATURE: 97.5 F | OXYGEN SATURATION: 100 % | RESPIRATION RATE: 24 BRPM | WEIGHT: 146.2 LBS | BODY MASS INDEX: 24.71 KG/M2

## 2023-12-19 DIAGNOSIS — Z11.3 SCREEN FOR STD (SEXUALLY TRANSMITTED DISEASE): Primary | ICD-10-CM

## 2023-12-19 PROCEDURE — 87389 HIV-1 AG W/HIV-1&-2 AB AG IA: CPT | Performed by: PHYSICIAN ASSISTANT

## 2023-12-19 PROCEDURE — 99213 OFFICE O/P EST LOW 20 MIN: CPT | Performed by: PHYSICIAN ASSISTANT

## 2023-12-19 PROCEDURE — 86780 TREPONEMA PALLIDUM: CPT | Performed by: PHYSICIAN ASSISTANT

## 2023-12-19 PROCEDURE — 87591 N.GONORRHOEAE DNA AMP PROB: CPT | Performed by: PHYSICIAN ASSISTANT

## 2023-12-19 PROCEDURE — 86803 HEPATITIS C AB TEST: CPT | Performed by: PHYSICIAN ASSISTANT

## 2023-12-19 PROCEDURE — 87340 HEPATITIS B SURFACE AG IA: CPT | Performed by: PHYSICIAN ASSISTANT

## 2023-12-19 PROCEDURE — 36415 COLL VENOUS BLD VENIPUNCTURE: CPT | Performed by: PHYSICIAN ASSISTANT

## 2023-12-19 PROCEDURE — 87491 CHLMYD TRACH DNA AMP PROBE: CPT | Performed by: PHYSICIAN ASSISTANT

## 2023-12-19 ASSESSMENT — ENCOUNTER SYMPTOMS
DYSURIA: 0
FREQUENCY: 0
CONSTIPATION: 0
FLANK PAIN: 0
DIARRHEA: 0
PALPITATIONS: 0
HEMATURIA: 0
FEVER: 0
CARDIOVASCULAR NEGATIVE: 1
GASTROINTESTINAL NEGATIVE: 1
HEARTBURN: 0
RESPIRATORY NEGATIVE: 1
SHORTNESS OF BREATH: 0
HEMATOCHEZIA: 0
CHILLS: 0
NAUSEA: 0
COUGH: 0
WHEEZING: 0
VOMITING: 0
FATIGUE: 0
ABDOMINAL PAIN: 0
CHEST TIGHTNESS: 0

## 2023-12-19 ASSESSMENT — PAIN SCALES - GENERAL: PAINLEVEL: NO PAIN (0)

## 2023-12-19 NOTE — PROGRESS NOTES
James Marr is a 23 year old, presenting for the following health issues:  STD (Patient seen in clinic for STD testing. Patient states she is not aware of any exposure and she has a new partner.)    HPI   Concern - STD check  Onset: few weeks  Description:  Has been a new partner now for the past few weeks and would like STD testing.  Did not use condoms.  No known exposure.  Intensity: none  Progression of Symptoms:  none  Accompanying Signs & Symptoms:  No dysuria, urinary frequency, urgency or hematuria. No vaginal d/c, bleeding, rashes and irritation.  Previous history of similar problem: none  Precipitating factors:        Worsened by: none  Alleviating factors:        Improved by: none  Therapies tried and outcome: none  Patient Active Problem List   Diagnosis    Alarcon's syndrome    FAP (familial adenomatous polyposis)    History of 2019 novel coronavirus disease (COVID-19)    Low grade squamous intraepithelial lesion (LGSIL) on cervical Pap smear    Ganglion cyst     Current Outpatient Medications   Medication    albuterol (PROAIR HFA/PROVENTIL HFA/VENTOLIN HFA) 108 (90 Base) MCG/ACT inhaler    diclofenac (VOLTAREN) 1 % topical gel    loperamide (IMODIUM A-D) 2 MG tablet    sulindac (CLINORIL) 150 MG tablet     No current facility-administered medications for this visit.        Allergies   Allergen Reactions    Latex Hives     Review of Systems   Constitutional:  Negative for chills, fatigue and fever.   Respiratory: Negative.  Negative for cough, chest tightness, shortness of breath and wheezing.    Cardiovascular: Negative.  Negative for chest pain, palpitations and peripheral edema.   Gastrointestinal: Negative.  Negative for abdominal pain, constipation, diarrhea, heartburn, hematochezia, nausea and vomiting.   Genitourinary:  Negative for dysuria, flank pain, frequency, hematuria, pelvic pain, urgency, vaginal bleeding and vaginal discharge.   All other systems reviewed and are negative.            Objective    /75 (BP Location: Left arm, Patient Position: Sitting, Cuff Size: Adult Regular)   Pulse 82   Temp 97.5  F (36.4  C) (Tympanic)   Resp 24   Wt 66.3 kg (146 lb 3.2 oz)   SpO2 100%   BMI 24.71 kg/m    Body mass index is 24.71 kg/m .  Physical Exam  Vitals and nursing note reviewed.   Constitutional:       General: She is not in acute distress.     Appearance: Normal appearance. She is normal weight. She is not ill-appearing.   Skin:     Capillary Refill: Capillary refill takes less than 2 seconds.   Neurological:      Mental Status: She is alert and oriented to person, place, and time.   Psychiatric:         Mood and Affect: Mood normal.         Behavior: Behavior normal.         Thought Content: Thought content normal.         Judgment: Judgment normal.          Assessment/Plan:  Screen for STD (sexually transmitted disease):  No known exposure and asymptomatic at this time.  Declined empiric treatment at this time.  Educated on safe sexual practices with condom use, limiting number of partners and abstinence.  Repeat testing in 3-6 months if persistent concern.  -     HIV Antigen Antibody Combo [AMS5131]; Future  -     Hepatitis B surface antigen [ICC122]; Future  -     Hepatitis C antibody [WEZ824]; Future  -     Treponema Abs w Reflex to RPR and Titer [DEK0902]; Future  -     Chlamydia trachomatis PCR [HHH794]; Future  -     Neisseria gonorrhoeae PCR [VLS6746]; Future        Zo See SEBASTIAN Ontiveros

## 2023-12-20 LAB
C TRACH DNA SPEC QL NAA+PROBE: NEGATIVE
HBV SURFACE AG SERPL QL IA: NONREACTIVE
HCV AB SERPL QL IA: NONREACTIVE
N GONORRHOEA DNA SPEC QL NAA+PROBE: NEGATIVE
T PALLIDUM AB SER QL: NONREACTIVE

## 2023-12-21 LAB — HIV 1+2 AB+HIV1 P24 AG SERPL QL IA: NONREACTIVE

## 2024-01-08 ENCOUNTER — VIRTUAL VISIT (OUTPATIENT)
Dept: FAMILY MEDICINE | Facility: CLINIC | Age: 24
End: 2024-01-08
Payer: COMMERCIAL

## 2024-01-08 DIAGNOSIS — F43.21 ADJUSTMENT DISORDER WITH DEPRESSED MOOD: Primary | ICD-10-CM

## 2024-01-08 PROCEDURE — 99213 OFFICE O/P EST LOW 20 MIN: CPT | Mod: 95 | Performed by: FAMILY MEDICINE

## 2024-01-08 RX ORDER — ESCITALOPRAM OXALATE 10 MG/1
TABLET ORAL
Qty: 30 TABLET | Refills: 0 | Status: SHIPPED | OUTPATIENT
Start: 2024-01-08 | End: 2025-01-12

## 2024-01-08 NOTE — PROGRESS NOTES
"    Instructions Relayed to Patient by Virtual Roomer:     Patient is active on 2d2c:   Relayed following to patient: \"It looks like you are active on Redeemrt, are you able to join the visit this way? If not, do you need us to send you a link now or would you like your provider to send a link via text or email when they are ready to initiate the visit?\"    Reminded patient to ensure they were logged on to virtual visit by arrival time listed. Documented in appointment notes if patient had flexibility to initiate visit sooner than arrival time. If pediatric virtual visit, ensured pediatric patient along with parent/guardian will be present for video visit.     Patient offered the website [email or web address] and/or phone number to 2d2c Help line: [phone]     Justa is a 23 year old who is being evaluated via a billable video visit.      How would you like to obtain your AVS? Puzl  If the video visit is dropped, the invitation should be resent by: Text to cell phone: [phone]  Will anyone else be joining your video visit? No          Assessment & Plan     Adjustment disorder with depressed mood  Had a long discussion with patient today about how she is feeling.  Lost her grandmother 6 months ago who was her best friend.  Still struggling to deal with some of that.  Has been seeing therapist on a regular basis.  But patient is experiencing quite a bit of anhedonia and isolation and overall fatigue.  Still going to work.  Does not feel severely depressed but just finding herself doing less and less and isolating more.  So we had a long discussion about potential options.  She is going to continue with therapy because it is helping.  We will start a low-dose of Lexapro and be in contact in a couple of weeks about how it is working.  - escitalopram (LEXAPRO) 10 MG tablet; Take 0.5 tablets (5 mg) by mouth daily for 4 days, THEN 1 tablet (10 mg) daily.       See Patient Instructions    Courtney " Ernesto Jenkins MD  [facility] FRANCESCA Marr is a 23 year old, presenting for the following health issues:  Consult (Low Energy Not Feeling Well, Mental Health Referral )        1/8/2024     4:09 PM   Additional Questions   Roomed by Desiree BETANCOURT   Accompanied by Self       History of Present Illness       Reason for visit:  Anxiety, no appetite to eat    She eats 0-1 servings of fruits and vegetables daily.She consumes 1 sweetened beverage(s) daily.She exercises with enough effort to increase her heart rate 30 to 60 minutes per day.  She exercises with enough effort to increase her heart rate 4 days per week. She is missing 2 dose(s) of medications per week.  She is not taking prescribed medications regularly due to remembering to take.       Concern - Low Energy, Not Feeling Well  Onset: 2 to 3 weeks ago   Description: Depression, Anxiety Possibly - Not appetite, Fatigue and lack of motivation pt reports there was a death in the family in August of this year.  But has noticed these symptoms recently.   Intensity: moderate, severe  Progression of Symptoms:  same  Accompanying Signs & Symptoms: pt reports Having Panic attacks - 2x a week     Previous history of similar problem: None   Precipitating factors:        Worsened by: When Overwhelmed   Alleviating factors:        Improved by: None   Therapies tried and outcome: Working out, Hobbies, but has not found any relief.     Video visit with patient today for the above.      Review of Systems   Constitutional, HEENT, cardiovascular, pulmonary, gi and gu systems are negative, except as otherwise noted.      Objective           Vitals:  No vitals were obtained today due to virtual visit.    Physical Exam   GENERAL: Healthy, alert and no distress  EYES: Eyes grossly normal to inspection.  No discharge or erythema, or obvious scleral/conjunctival abnormalities.  RESP: No audible wheeze, cough, or visible cyanosis.  No visible retractions or  increased work of breathing.    SKIN: Visible skin clear. No significant rash, abnormal pigmentation or lesions.  NEURO: Cranial nerves grossly intact.  Mentation and speech appropriate for age.  PSYCH: Mentation appears normal, affect normal/bright, judgement and insight intact, normal speech and appearance well-groomed.    Past labs reviewed with the patient.             Video-Visit Details    Type of service:  Video Visit     Originating Location (pt. Location): Home    Distant Location (provider location):  Off-site  Platform used for Video Visit: [employer]Well

## 2024-02-26 ENCOUNTER — PATIENT OUTREACH (OUTPATIENT)
Dept: GASTROENTEROLOGY | Facility: CLINIC | Age: 24
End: 2024-02-26
Payer: COMMERCIAL

## 2024-03-04 DIAGNOSIS — D13.91 FAP (FAMILIAL ADENOMATOUS POLYPOSIS): Primary | ICD-10-CM

## 2024-03-05 ENCOUNTER — TELEPHONE (OUTPATIENT)
Dept: GASTROENTEROLOGY | Facility: CLINIC | Age: 24
End: 2024-03-05
Payer: COMMERCIAL

## 2024-03-05 NOTE — TELEPHONE ENCOUNTER
"Patient requested to schedule flex sig with John due to seeing him previously    Endoscopy Scheduling Screen    Have you had a positive Covid test in the last 14 days?  No    Are you active on MyChart?   Yes    What insurance is in the chart?  Other:  [facility]    Ordering/Referring Provider: JOHN   (If ordering provider performs procedure, schedule with ordering provider unless otherwise instructed. )    BMI: Estimated body mass index is 24.71 kg/m  as calculated from the following:    Height as of 10/10/23: 1.638 m (5' 4.5\").    Weight as of 12/19/23: 66.3 kg (146 lb 3.2 oz).     Sedation Ordered  moderate sedation.   If patient BMI > 50 do not schedule in [facility].    If patient BMI > 45 do not schedule at [facility].    Are you taking methadone or Suboxone?  No    Have you had difficulties, pain, or discomfort during past endoscopy procedures?  No    Are you taking any prescription medications for pain 3 or more times per week?   NO - No RN review required.    Do you have a history of malignant hyperthermia or adverse reaction to anesthesia?  No    (Females) Are you currently pregnant?   No     Have you been diagnosed or told you have pulmonary hypertension?   No    Do you have an LVAD?  No    Have you been told you have moderate to severe sleep apnea?  No    Have you been told you have COPD, asthma, or any other lung disease?  No    Do you have any heart conditions?  No     Have you ever had an organ transplant?   No    Have you ever had or are you awaiting a heart or lung transplant?   No    Have you had a stroke or transient ischemic attack (TIA aka \"mini stroke\" in the last 6 months?   No    Have you been diagnosed with or been told you have cirrhosis of the liver?   No    Are you currently on dialysis?   No    Do you need assistance transferring?   No    BMI: Estimated body mass index is 24.71 kg/m  as calculated from the following:    Height as of 10/10/23: 1.638 m (5' 4.5\").    Weight as of 12/19/23: 66.3 kg (146 " lb 3.2 oz).     Is patients BMI > 40 and scheduling location [facility]?  No    Do you take an injectable medication for weight loss or diabetes (excluding insulin)?  No    Do you take the medication Naltrexone?  No    Do you take blood thinners?  No       Prep   Are you currently on dialysis or do you have chronic kidney disease?  No    Do you have a diagnosis of diabetes?  No    Do you have a diagnosis of cystic fibrosis (CF)?  No    On a regular basis do you go 3 -5 days between bowel movements?  No    BMI > 40?  No    Preferred Pharmacy:    [facility] - [place], [place] - [address], [address]  [address], [address]  [facility] [address]  Phone: [phone] Fax: [phone]      Final Scheduling Details   Colonoscopy prep sent?  N/A    Procedure scheduled  Flex Sig/ EGD      Surgeon:  FLEX: JOHN  EGD: ANA     Date of procedure:  FLEX: 6/12/24  EGD: 5/1     Pre-OP / PAC:   No - Not required for this site.    Location  [facility] - [facility] - Patient preference.    Sedation   Moderate Sedation - Per order.      Patient Reminders:   You will receive a call from a Nurse to review instructions and health history.  This assessment must be completed prior to your procedure.  Failure to complete the Nurse assessment may result in the procedure being cancelled.      On the day of your procedure, please designate an adult(s) who can drive you home stay with you for the next 24 hours. The medicines used in the exam will make you sleepy. You will not be able to drive.      You cannot take public transportation, ride share services, or non-medical taxi service without a responsible caregiver.  Medical transport services are allowed with the requirement that a responsible caregiver will receive you at your destination.  We require that drivers and caregivers are confirmed prior to your procedure.

## 2024-03-18 ENCOUNTER — OFFICE VISIT (OUTPATIENT)
Dept: FAMILY MEDICINE | Facility: CLINIC | Age: 24
End: 2024-03-18
Payer: COMMERCIAL

## 2024-03-18 ENCOUNTER — ANCILLARY PROCEDURE (OUTPATIENT)
Dept: GENERAL RADIOLOGY | Facility: CLINIC | Age: 24
End: 2024-03-18
Attending: PHYSICIAN ASSISTANT
Payer: COMMERCIAL

## 2024-03-18 VITALS
OXYGEN SATURATION: 98 % | HEART RATE: 95 BPM | SYSTOLIC BLOOD PRESSURE: 118 MMHG | TEMPERATURE: 98.1 F | WEIGHT: 137 LBS | DIASTOLIC BLOOD PRESSURE: 74 MMHG | BODY MASS INDEX: 23.15 KG/M2 | RESPIRATION RATE: 18 BRPM

## 2024-03-18 DIAGNOSIS — J20.9 ACUTE BRONCHITIS, UNSPECIFIED ORGANISM: Primary | ICD-10-CM

## 2024-03-18 DIAGNOSIS — R06.02 SOB (SHORTNESS OF BREATH): ICD-10-CM

## 2024-03-18 PROCEDURE — 71046 X-RAY EXAM CHEST 2 VIEWS: CPT | Mod: TC | Performed by: RADIOLOGY

## 2024-03-18 PROCEDURE — 99214 OFFICE O/P EST MOD 30 MIN: CPT | Performed by: PHYSICIAN ASSISTANT

## 2024-03-18 RX ORDER — PREDNISONE 20 MG/1
40 TABLET ORAL DAILY
Qty: 10 TABLET | Refills: 0 | Status: SHIPPED | OUTPATIENT
Start: 2024-03-18 | End: 2024-03-23

## 2024-03-18 NOTE — PROGRESS NOTES
Chief Complaint   Patient presents with    Cough     Has had cough several weeks started with SOB  3 days ago now has chest pain when taking in deep breath using her inhaler  has had this before       ASSESSMENT/PLAN:  Justa was seen today for cough.    Diagnoses and all orders for this visit:    Acute bronchitis, unspecified organism  -     predniSONE (DELTASONE) 20 MG tablet; Take 2 tablets (40 mg) by mouth daily for 5 days    SOB (shortness of breath)  -     XR Chest 2 Views; Future    X-ray negative.  Currently reassuring exam and vitals.  Considered pericarditis myocarditis but this seems less likely given the pain is easily reproducible on exam and when coughing.  Did offer EKG but defers for now.  Will come back if chest pain becomes more persistent or worsens.  Start on prednisone   Symptomatic cares and expected length of symptoms discussed at length and outlined in AVS  Return precautions also discussed    Ernesto Reagan PA-C      SUBJECTIVE:  Wally is a 23 year old female who presents to urgent care with 2-3 weeks of cough.  She started having some shortness of breath over the last 3 days.  She describes it like she cannot quite catch her breath.  Been more wheezy and she has been using her albuterol inhaler with helps briefly.  She started having chest pain last night when she was coughing and its persisted throughout the day.  Chest pain is mainly when she is coughing.  Cough is dry.  No fevers or chills.  No nasal congestion or runny nose.  No sore throat.  Has a history of getting this each year and is usually bronchitis    ROS: Pertinent ROS neg other than the symptoms noted above in the HPI.     OBJECTIVE:  /74   Pulse 95   Temp 98.1  F (36.7  C) (Oral)   Resp 18   Wt 62.1 kg (137 lb)   SpO2 98%   BMI 23.15 kg/m     GENERAL: alert and no distress  EYES: Eyes grossly normal to inspection, PERRL and conjunctivae and sclerae normal  HENT: ear canals and TM's normal, nose and mouth  From: Tyree Johnson. To: Dr. Del Cid Court: 6/6/2022 12:39 PM EDT  Subject: Meds     Will you please refill my [id number] mg tablet take one a day.  Lisinopril 2.5 mg tablet take one a day without ulcers or lesions  RESP: lungs clear to auscultation - no rales, rhonchi or wheezes, cough  MSK: Diffuse sternal chest tenderness  CV: regular rate and rhythm, normal S1 S2, no S3 or S4, no murmur, click or rub, no peripheral edema     DIAGNOSTICS  Xray - Reviewed and interpreted by me.  No acute cardiopulmonary abnormality  Results for orders placed or performed in visit on 03/18/24   XR Chest 2 Views     Status: None    Narrative    EXAM: XR CHEST 2 VIEWS  LOCATION: [facility]  DATE: 3/18/2024    INDICATION: cough x 3 weeks, new onset SOB and plueritic chest pain  COMPARISON: None.      Impression    IMPRESSION: Negative chest.        Current Outpatient Medications   Medication    albuterol (PROAIR HFA/PROVENTIL HFA/VENTOLIN HFA) 108 (90 Base) MCG/ACT inhaler    escitalopram (LEXAPRO) 10 MG tablet    loperamide (IMODIUM A-D) 2 MG tablet    sulindac (CLINORIL) 150 MG tablet    diclofenac (VOLTAREN) 1 % topical gel     No current facility-administered medications for this visit.      Patient Active Problem List   Diagnosis    Alarcon's syndrome    FAP (familial adenomatous polyposis)    History of 2019 novel coronavirus disease (COVID-19)    Low grade squamous intraepithelial lesion (LGSIL) on cervical Pap smear    Ganglion cyst    Adjustment disorder with depressed mood      Past Medical History:   Diagnosis Date    Familial polyposis      Past Surgical History:   Procedure Laterality Date    COLONOSCOPY      COLONOSCOPY N/A 08/17/2020    Procedure: Colonoscopy, With Polypectomy And Biopsy;  Surgeon: Nathan Hernandez MD;  Location: [facility]    COLONOSCOPY WITH CO2 INSUFFLATION N/A 08/17/2020    Procedure: COLONOSCOPY, WITH CO2 INSUFFLATION;  Surgeon: Nathan Hernandez MD;  [facility]: [facility]    COMBINED ESOPHAGOSCOPY, GASTROSCOPY, DUODENOSCOPY (EGD) WITH CO2 INSUFFLATION N/A 08/17/2020    Procedure: ESOPHAGOGASTRODUODENOSCOPY, WITH CO2 INSUFFLATION;  Surgeon: Nathan Hernandez  MD Juan Ramon;  Location: [facility]    ENDOSCOPY      ESOPHAGOSCOPY, GASTROSCOPY, DUODENOSCOPY (EGD), COMBINED N/A 08/17/2020    Procedure: Esophagogastroduodenoscopy, With Biopsy;  Surgeon: Nathan Hernandez MD;  Location: [facility]    EXCISE GANGLION HAND Left 2/10/2023    Procedure: EXCISION, GANGLION CYST, left long finger;  Surgeon: TONY Wolff MD;  Location: [facility]    HEAD & NECK SURGERY  5 years ago    bump  that was removed    LAPAROSCOPIC ASSISTED COLECTOMY N/A 04/22/2021    Procedure: Laparoscopic assisted total abdominal colectomy with ileorectal anastomosis, takedown of splenic flexure;  Surgeon: Wade Maguire MD;  [facility]    OMENTECTOMY N/A 04/22/2021    Procedure: PARTIAL OMENTECTOMY;  Surgeon: Wade Maguire MD;  [facility]    SIGMOIDOSCOPY FLEXIBLE N/A 04/22/2021    Procedure: SIGMOIDOSCOPY, FLEXIBLE;  Surgeon: Wade Maguire MD;  [facility]    TONSILLECTOMY       Family History   Problem Relation Age of Onset    Hypertension Mother     Hypertension Maternal Grandfather     Deep Vein Thrombosis (DVT) Maternal Grandfather      Social History     Tobacco Use    Smoking status: Never    Smokeless tobacco: Never   Substance Use Topics    Alcohol use: Not Currently     Comment: rare              The plan of care was discussed with the patient. They understand and agree with the course of treatment prescribed. A printed summary was given including instructions and medications.  The use of Dragon/[employer] dictation services may have been used to construct the content in this note; any grammatical or spelling errors are non-intentional. Please contact the author of this note directly if you are in need of any clarification.

## 2024-04-19 ENCOUNTER — TELEPHONE (OUTPATIENT)
Dept: GASTROENTEROLOGY | Facility: CLINIC | Age: 24
End: 2024-04-19
Payer: COMMERCIAL

## 2024-04-19 NOTE — TELEPHONE ENCOUNTER
Pre assessment completed for upcoming procedure.   (Please see previous telephone encounter notes for complete details)    Patient  returned call.       Procedure details:    Arrival time and facility location reviewed.    Pre op exam needed? N/A    Designated  policy reviewed. Instructed to have someone stay 6  hours post procedure.       Medication review:    Medications reviewed. Please see supporting documentation below. Holding recommendations discussed (if applicable).  No NSAIDs.       Prep for procedure:     Procedure prep instructions reviewed.        Any additional information needed:  N/A      Patient  verbalized understanding and had no questions or concerns at this time.      Harika Vernon RN  Endoscopy Procedure Pre Assessment RN  [phone] option 4

## 2024-04-19 NOTE — TELEPHONE ENCOUNTER
Attempted to contact patient in order to complete pre assessment questions.     No answer. Left message to return call to [phone] option 4    Callback required communication sent via [employer].      Procedure details:    Patient scheduled for Upper endoscopy (EGD) on 5/2/24.     Arrival time: 0910. Procedure time 0955    Facility location: [facility]; [address], [place] [address]. Check in location: 2nd Floor at Surgery desk.    Sedation type: Conscious sedation     Pre op exam needed? N/A    Indication for procedure: FAP (familial adenomatous polyposis)       Chart review:     Electronic implanted devices? No    Recent diagnosis of diverticulitis within the last 6 weeks? No    Diabetic? No      Medication review:    Anticoagulants? No    NSAIDS? No NSAID medications per patient's medication list.  RN will verify with pre-assessment call.    Other medication HOLDING recommendations:  N/A      Prep for procedure:       Prep instructions sent via [employer].       Corinne Kliber, RN  Endoscopy Procedure Pre Assessment RN

## 2024-04-24 ENCOUNTER — PATIENT OUTREACH (OUTPATIENT)
Dept: OBGYN | Facility: CLINIC | Age: 24
End: 2024-04-24
Payer: COMMERCIAL

## 2024-04-24 DIAGNOSIS — R87.612 LOW GRADE SQUAMOUS INTRAEPITHELIAL LESION (LGSIL) ON CERVICAL PAP SMEAR: Primary | ICD-10-CM

## 2024-05-02 ENCOUNTER — HOSPITAL ENCOUNTER (OUTPATIENT)
Facility: AMBULATORY SURGERY CENTER | Age: 24
Discharge: HOME | End: 2024-05-02
Attending: INTERNAL MEDICINE
Payer: COMMERCIAL

## 2024-05-02 ENCOUNTER — ANESTHESIA (OUTPATIENT)
Dept: SURGERY | Facility: AMBULATORY SURGERY CENTER | Age: 24
End: 2024-05-02
Payer: COMMERCIAL

## 2024-05-02 ENCOUNTER — ANESTHESIA EVENT (OUTPATIENT)
Dept: SURGERY | Facility: AMBULATORY SURGERY CENTER | Age: 24
End: 2024-05-02
Payer: COMMERCIAL

## 2024-05-02 VITALS — HEART RATE: 78 BPM

## 2024-05-02 VITALS
SYSTOLIC BLOOD PRESSURE: 122 MMHG | OXYGEN SATURATION: 98 % | RESPIRATION RATE: 16 BRPM | DIASTOLIC BLOOD PRESSURE: 69 MMHG | HEART RATE: 75 BPM | TEMPERATURE: 99.3 F

## 2024-05-02 DIAGNOSIS — D13.91 FAP (FAMILIAL ADENOMATOUS POLYPOSIS): Primary | ICD-10-CM

## 2024-05-02 LAB — UPPER GI ENDOSCOPY: NORMAL

## 2024-05-02 PROCEDURE — 43251 EGD REMOVE LESION SNARE: CPT | Performed by: NURSE ANESTHETIST, CERTIFIED REGISTERED

## 2024-05-02 PROCEDURE — G8918 PT W/O PREOP ORDER IV AB PRO: HCPCS

## 2024-05-02 PROCEDURE — 43251 EGD REMOVE LESION SNARE: CPT | Performed by: ANESTHESIOLOGY

## 2024-05-02 PROCEDURE — 43251 EGD REMOVE LESION SNARE: CPT

## 2024-05-02 PROCEDURE — 88305 TISSUE EXAM BY PATHOLOGIST: CPT | Performed by: PATHOLOGY

## 2024-05-02 PROCEDURE — 88342 IMHCHEM/IMCYTCHM 1ST ANTB: CPT | Performed by: PATHOLOGY

## 2024-05-02 PROCEDURE — G8907 PT DOC NO EVENTS ON DISCHARG: HCPCS

## 2024-05-02 RX ORDER — SODIUM CHLORIDE, SODIUM LACTATE, POTASSIUM CHLORIDE, CALCIUM CHLORIDE 600; 310; 30; 20 MG/100ML; MG/100ML; MG/100ML; MG/100ML
INJECTION, SOLUTION INTRAVENOUS CONTINUOUS
Status: DISCONTINUED | OUTPATIENT
Start: 2024-05-02 | End: 2024-05-03 | Stop reason: HOSPADM

## 2024-05-02 RX ORDER — LIDOCAINE 40 MG/G
CREAM TOPICAL
Status: DISCONTINUED | OUTPATIENT
Start: 2024-05-02 | End: 2024-05-03 | Stop reason: HOSPADM

## 2024-05-02 RX ORDER — FLUMAZENIL 0.1 MG/ML
0.2 INJECTION, SOLUTION INTRAVENOUS
Status: ACTIVE | OUTPATIENT
Start: 2024-05-02 | End: 2024-05-02

## 2024-05-02 RX ORDER — NALOXONE HYDROCHLORIDE 0.4 MG/ML
0.2 INJECTION, SOLUTION INTRAMUSCULAR; INTRAVENOUS; SUBCUTANEOUS
Status: DISCONTINUED | OUTPATIENT
Start: 2024-05-02 | End: 2024-05-03 | Stop reason: HOSPADM

## 2024-05-02 RX ORDER — NALOXONE HYDROCHLORIDE 0.4 MG/ML
0.4 INJECTION, SOLUTION INTRAMUSCULAR; INTRAVENOUS; SUBCUTANEOUS
Status: DISCONTINUED | OUTPATIENT
Start: 2024-05-02 | End: 2024-05-03 | Stop reason: HOSPADM

## 2024-05-02 RX ORDER — ONDANSETRON 4 MG/1
4 TABLET, ORALLY DISINTEGRATING ORAL EVERY 6 HOURS PRN
Status: DISCONTINUED | OUTPATIENT
Start: 2024-05-02 | End: 2024-05-03 | Stop reason: HOSPADM

## 2024-05-02 RX ORDER — ONDANSETRON 2 MG/ML
4 INJECTION INTRAMUSCULAR; INTRAVENOUS EVERY 6 HOURS PRN
Status: DISCONTINUED | OUTPATIENT
Start: 2024-05-02 | End: 2024-05-03 | Stop reason: HOSPADM

## 2024-05-02 RX ORDER — ONDANSETRON 2 MG/ML
4 INJECTION INTRAMUSCULAR; INTRAVENOUS
Status: DISCONTINUED | OUTPATIENT
Start: 2024-05-02 | End: 2024-05-03 | Stop reason: HOSPADM

## 2024-05-02 RX ORDER — PROCHLORPERAZINE MALEATE 10 MG
10 TABLET ORAL EVERY 6 HOURS PRN
Status: DISCONTINUED | OUTPATIENT
Start: 2024-05-02 | End: 2024-05-03 | Stop reason: HOSPADM

## 2024-05-02 RX ORDER — PROPOFOL 10 MG/ML
INJECTION, EMULSION INTRAVENOUS CONTINUOUS PRN
Status: DISCONTINUED | OUTPATIENT
Start: 2024-05-02 | End: 2024-05-02

## 2024-05-02 RX ORDER — PROPOFOL 10 MG/ML
INJECTION, EMULSION INTRAVENOUS PRN
Status: DISCONTINUED | OUTPATIENT
Start: 2024-05-02 | End: 2024-05-02

## 2024-05-02 RX ORDER — LIDOCAINE HYDROCHLORIDE 20 MG/ML
INJECTION, SOLUTION INFILTRATION; PERINEURAL PRN
Status: DISCONTINUED | OUTPATIENT
Start: 2024-05-02 | End: 2024-05-02

## 2024-05-02 RX ADMIN — PROPOFOL 50 MG: 10 INJECTION, EMULSION INTRAVENOUS at 09:43

## 2024-05-02 RX ADMIN — PROPOFOL 50 MG: 10 INJECTION, EMULSION INTRAVENOUS at 09:52

## 2024-05-02 RX ADMIN — SODIUM CHLORIDE, SODIUM LACTATE, POTASSIUM CHLORIDE, CALCIUM CHLORIDE: 600; 310; 30; 20 INJECTION, SOLUTION INTRAVENOUS at 09:13

## 2024-05-02 RX ADMIN — LIDOCAINE HYDROCHLORIDE 60 MG: 20 INJECTION, SOLUTION INFILTRATION; PERINEURAL at 09:42

## 2024-05-02 RX ADMIN — PROPOFOL 30 MG: 10 INJECTION, EMULSION INTRAVENOUS at 09:44

## 2024-05-02 RX ADMIN — PROPOFOL 200 MCG/KG/MIN: 10 INJECTION, EMULSION INTRAVENOUS at 09:44

## 2024-05-02 RX ADMIN — PROPOFOL 70 MG: 10 INJECTION, EMULSION INTRAVENOUS at 09:42

## 2024-05-02 RX ADMIN — PROPOFOL 50 MG: 10 INJECTION, EMULSION INTRAVENOUS at 09:47

## 2024-05-02 NOTE — H&P
[facility] Anesthesia Pre-op History and Physical    Wally Varma MRN# [id number]   Age: 23 year old YOB: 2000            Date of Exam 5/2/2024         Primary care provider: Courtney Jenkins         Chief Complaint and/or Reason for Procedure:     History of FAP         Active problem list:     Patient Active Problem List    Diagnosis Date Noted    Adjustment disorder with depressed mood 01/08/2024     Priority: Medium    Ganglion cyst 12/16/2022     Priority: Medium     Added automatically from request for surgery [id number]      Low grade squamous intraepithelial lesion (LGSIL) on cervical Pap smear 08/29/2022     Priority: Medium     8/22/22 LSIL pap. Plan: pap in 1 year  5/5/23 LSIL pap @ 21 yo. Plan: pap in 1 yr.   5/11/23 Myhcart sent to patient. Pt read same day        History of 2019 novel coronavirus disease (COVID-19) 09/21/2021     Priority: Medium    FAP (familial adenomatous polyposis) 02/09/2021     Priority: Medium     Status post total abdominal colectomy 2021      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     Priority: Medium            Medications (include herbals and vitamins):   Any Plavix use in the last 7 days? No     Current Outpatient Medications   Medication Sig Dispense Refill    albuterol (PROAIR HFA/PROVENTIL HFA/VENTOLIN HFA) 108 (90 Base) MCG/ACT inhaler Inhale 2 puffs into the lungs every 4 hours as needed (Wheeze or cough) 8.5 g 2    diclofenac (VOLTAREN) 1 % topical gel Apply 2 g topically 3 times daily as needed for moderate pain (hand pain) 100 g 3    escitalopram (LEXAPRO) 10 MG tablet Take 0.5 tablets (5 mg) by mouth daily for 4 days, THEN 1 tablet (10 mg) daily. 30 tablet 0    loperamide (IMODIUM A-D) 2 MG tablet Take 1 tablet (2 mg) by mouth 2 times daily 60 tablet 11    sulindac (CLINORIL) 150 MG tablet Take 1 tablet (150 mg) by mouth 2 times daily 90 tablet 11     Current Facility-Administered Medications   Medication Dose Route Frequency Provider Last Rate  Last Admin    lactated ringers infusion   Intravenous Continuous Shailesh Mandujano MD 10 mL/hr at 05/02/24 0913 New Bag at 05/02/24 0913    lidocaine (LMX4) kit   Topical Q1H PRN Itzel Scott DO        lidocaine 1 % 0.1-1 mL  0.1-1 mL Other Q1H PRN Itzel Scott DO        ondansetron (ZOFRAN) injection 4 mg  4 mg Intravenous Once PRN Itzel Scott DO        sodium chloride (PF) 0.9% PF flush 3 mL  3 mL Intracatheter Q8H Itzel Scott DO        sodium chloride (PF) 0.9% PF flush 3 mL  3 mL Intracatheter q1 min prn Itzel Scott DO                 Allergies:      Allergies   Allergen Reactions    Latex Hives     Allergy to Latex? No  Allergy to tape?   No  Intolerances:             Physical Exam:   All vitals have been reviewed  Patient Vitals for the past 8 hrs:   BP Temp Temp src Pulse Resp SpO2   05/02/24 0851 102/56 99.3  F (37.4  C) Temporal 75 16 97 %     No intake/output data recorded.  Lungs:   No increased work of breathing, good air exchange, clear to auscultation bilaterally, no crackles or wheezing     Cardiovascular:   normal S1 and S2             Lab / Radiology Results:            Anesthetic risk and/or ASA classification:     1  Itzel Scott DO

## 2024-05-02 NOTE — ANESTHESIA PREPROCEDURE EVALUATION
Anesthesia Pre-Procedure Evaluation    Patient: Wally Varma   MRN: [id number] : 2000        Procedure : Procedure(s):  Combined Esophagoscopy, Gastroscopy, Duodenoscopy (Egd) With Co2 Insufflation          Past Medical History:   Diagnosis Date    Familial polyposis       Past Surgical History:   Procedure Laterality Date    COLONOSCOPY      COLONOSCOPY N/A 2020    Procedure: Colonoscopy, With Polypectomy And Biopsy;  Surgeon: Nathan Hernandez MD;  [facility]    COLONOSCOPY WITH CO2 INSUFFLATION N/A 2020    Procedure: COLONOSCOPY, WITH CO2 INSUFFLATION;  Surgeon: Nathan Hernandez MD;  [facility]    COMBINED ESOPHAGOSCOPY, GASTROSCOPY, DUODENOSCOPY (EGD) WITH CO2 INSUFFLATION N/A 2020    Procedure: ESOPHAGOGASTRODUODENOSCOPY, WITH CO2 INSUFFLATION;  Surgeon: Nathan Hernandez MD;  [facility]    ENDOSCOPY      ESOPHAGOSCOPY, GASTROSCOPY, DUODENOSCOPY (EGD), COMBINED N/A 2020    Procedure: Esophagogastroduodenoscopy, With Biopsy;  Surgeon: Nathan Hernandez MD;  [facility]    EXCISE GANGLION HAND Left 2/10/2023    Procedure: EXCISION, GANGLION CYST, left long finger;  Surgeon: TONY Wolff MD;  [facility]: [facility]    HEAD & NECK SURGERY  5 years ago    bump  that was removed    LAPAROSCOPIC ASSISTED COLECTOMY N/A 2021    Procedure: Laparoscopic assisted total abdominal colectomy with ileorectal anastomosis, takedown of splenic flexure;  Surgeon: Wade Maguire MD;  [facility]    OMENTECTOMY N/A 2021    Procedure: PARTIAL OMENTECTOMY;  Surgeon: Wade Maguire MD;  [facility]    SIGMOIDOSCOPY FLEXIBLE N/A 2021    Procedure: SIGMOIDOSCOPY, FLEXIBLE;  Surgeon: Wade Maguire MD;  [facility]    TONSILLECTOMY        Allergies   Allergen Reactions    Latex Hives      Social History     Tobacco Use    Smoking status: Never    Smokeless tobacco: Never   Substance Use Topics  "   Alcohol use: Not Currently     Comment: rare      Wt Readings from Last 1 Encounters:   03/18/24 62.1 kg (137 lb)        Anesthesia Evaluation            ROS/MED HX  ENT/Pulmonary:     (+)                     Intermittent, asthma                  Neurologic:  - neg neurologic ROS     Cardiovascular:  - neg cardiovascular ROS     METS/Exercise Tolerance:     Hematologic:  - neg hematologic  ROS     Musculoskeletal:  - neg musculoskeletal ROS     GI/Hepatic:  - neg GI/hepatic ROS     Renal/Genitourinary:  - neg Renal ROS     Endo:  - neg endo ROS     Psychiatric/Substance Use:     (+) psychiatric history depression       Infectious Disease:  - neg infectious disease ROS     Malignancy:       Other:            Physical Exam    Airway  airway exam normal      Mallampati: I       Respiratory Devices and Support         Dental       (+) Minor Abnormalities - some fillings, tiny chips      Cardiovascular   cardiovascular exam normal          Pulmonary   pulmonary exam normal                OUTSIDE LABS:  CBC:   Lab Results   Component Value Date    WBC 7.2 04/12/2021    HGB 12.1 04/23/2021    HGB 13.1 04/12/2021    HCT 39.6 04/12/2021     04/12/2021     BMP:   Lab Results   Component Value Date     09/21/2021     04/12/2021    POTASSIUM 4.1 09/21/2021    POTASSIUM 3.9 04/22/2021    CHLORIDE 108 09/21/2021    CHLORIDE 106 04/12/2021    CO2 28 09/21/2021    CO2 28 04/12/2021    BUN 12 09/21/2021    BUN 11 04/12/2021    CR 0.55 09/21/2021    CR 0.57 04/12/2021    GLC 95 10/10/2023    GLC 95 09/21/2021     COAGS: No results found for: \"PTT\", \"INR\", \"FIBR\"  POC:   Lab Results   Component Value Date    BGM 85 04/22/2021    HCG Negative 09/09/2022     HEPATIC:   Lab Results   Component Value Date    ALBUMIN 3.9 09/21/2021    PROTTOTAL 7.4 09/21/2021    ALT 37 09/21/2021    AST 23 09/21/2021    ALKPHOS 86 09/21/2021    BILITOTAL 0.5 09/21/2021     OTHER:   Lab Results   Component Value Date    SORIN 8.9 " 09/21/2021    PHOS 2.4 (L) 04/22/2021    MAG 1.9 04/22/2021    TSH 2.16 09/21/2021       Anesthesia Plan    ASA Status:  2    NPO Status:  NPO Appropriate    Anesthesia Type: MAC.     - Reason for MAC: straight local not clinically adequate   Induction: Intravenous.   Maintenance: TIVA.        Consents    Anesthesia Plan(s) and associated risks, benefits, and realistic alternatives discussed. Questions answered and patient/representative(s) expressed understanding.     - Discussed: Risks, Benefits and Alternatives for BOTH SEDATION and the PROCEDURE were discussed     - Discussed with:  Patient            Postoperative Care       PONV prophylaxis: Ondansetron (or other 5HT-3), Background Propofol Infusion     Comments:               Shailesh Mandujano MD    I have reviewed the pertinent notes and labs in the chart from the past 30 days and (re)examined the patient.  Any updates or changes from those notes are reflected in this note.

## 2024-05-02 NOTE — ANESTHESIA CARE TRANSFER NOTE
Patient: Wally Varma    Procedure: Procedure(s):  Combined Esophagoscopy, Gastroscopy, Duodenoscopy (Egd) With Co2 Insufflation  ESOPHAGOGASTRODUODENOSCOPY, WITH LESION REMOVAL USING SNARE  ESOPHAGOGASTRODUODENOSCOPY, WITH BIOPSY       Diagnosis: FAP (familial adenomatous polyposis) [D13.91]  Diagnosis Additional Information: No value filed.    Anesthesia Type:   MAC     Note:      Level of Consciousness: awake  Oxygen Supplementation: room air    Independent Airway: airway patency satisfactory and stable  Dentition: dentition unchanged  Vital Signs Stable: post-procedure vital signs reviewed and stable  Report to RN Given: handoff report given  Patient transferred to: Phase II    Handoff Report: Identifed the Patient, Identified the Reponsible Provider, Reviewed the pertinent medical history, Discussed the surgical course, Reviewed Intra-OP anesthesia mangement and issues during anesthesia, Set expectations for post-procedure period and Allowed opportunity for questions and acknowledgement of understanding      Vitals:  Vitals Value Taken Time   BP 88/50    Temp 98    Pulse 76    Resp 16    SpO2 98        Electronically Signed By: LUIS Louis CRNA  May 2, 2024  10:03 AM

## 2024-05-02 NOTE — ANESTHESIA POSTPROCEDURE EVALUATION
Patient: Wally Varma    Procedure: Procedure(s):  Combined Esophagoscopy, Gastroscopy, Duodenoscopy (Egd) With Co2 Insufflation  ESOPHAGOGASTRODUODENOSCOPY, WITH LESION REMOVAL USING SNARE  ESOPHAGOGASTRODUODENOSCOPY, WITH BIOPSY       Anesthesia Type:  MAC    Note:  Disposition: Outpatient   Postop Pain Control: Uneventful            Sign Out: Well controlled pain   PONV: No   Neuro/Psych: Uneventful            Sign Out: Acceptable/Baseline neuro status   Airway/Respiratory: Uneventful            Sign Out: Acceptable/Baseline resp. status   CV/Hemodynamics: Uneventful            Sign Out: Acceptable CV status; No obvious hypovolemia; No obvious fluid overload   Other NRE: NONE   DID A NON-ROUTINE EVENT OCCUR?            Last vitals:  Vitals Value Taken Time   /69 05/02/24 1035   Temp     Pulse     Resp 16 05/02/24 1035   SpO2 98 % 05/02/24 1035       Electronically Signed By: Shailesh Mandujano MD  May 2, 2024  1:27 PM

## 2024-05-29 ENCOUNTER — TELEPHONE (OUTPATIENT)
Dept: GASTROENTEROLOGY | Facility: CLINIC | Age: 24
End: 2024-05-29
Payer: COMMERCIAL

## 2024-05-29 NOTE — TELEPHONE ENCOUNTER
Pre assessment completed for upcoming procedure.      Procedure details:    Patient scheduled for Flexible sigmoidoscopy  on 6.12.2024.     Arrival time: 0630. Procedure time 0730    Facility location: [facility]; [address], [place] [address]. Check in location: 2nd Floor.    Sedation type: MAC    Pre op exam needed? N/A    Indication for procedure: FAP (familial adenomatous polyposis) [D13.91]  - Primary     Designated  policy reviewed. Instructed to have someone stay 24 hours post procedure.       Chart review:     Electronic implanted devices? No    Recent diagnosis of diverticulitis within the last 6 weeks?  No    Diabetic? No      Medication review:    Anticoagulants? No    NSAIDS? Yes.  Sulindac (Clinoril).  Holding interval of 4 days.    Other medication HOLDING recommendations:  N/A      Prep for procedure:     Bowel prep recommendation: Miralax without magnesium citrate  Due to:  d/t past poor preps with enemas    Prep instructions sent via [employer]     Reviewed procedure prep instructions.     Patient verbalized understanding and had no questions or concerns at this time.        July Gunter RN  Endoscopy Procedure Pre Assessment RN  [phone] option 4

## 2024-06-04 ENCOUNTER — OFFICE VISIT (OUTPATIENT)
Dept: OBGYN | Facility: CLINIC | Age: 24
End: 2024-06-04
Payer: COMMERCIAL

## 2024-06-04 VITALS
DIASTOLIC BLOOD PRESSURE: 83 MMHG | HEIGHT: 65 IN | HEART RATE: 95 BPM | BODY MASS INDEX: 23.15 KG/M2 | OXYGEN SATURATION: 100 % | SYSTOLIC BLOOD PRESSURE: 148 MMHG

## 2024-06-04 DIAGNOSIS — Z12.4 CERVICAL CANCER SCREENING: ICD-10-CM

## 2024-06-04 DIAGNOSIS — Z11.3 SCREEN FOR STD (SEXUALLY TRANSMITTED DISEASE): ICD-10-CM

## 2024-06-04 DIAGNOSIS — N76.0 BV (BACTERIAL VAGINOSIS): Primary | ICD-10-CM

## 2024-06-04 DIAGNOSIS — N93.9 VAGINAL SPOTTING: ICD-10-CM

## 2024-06-04 DIAGNOSIS — N89.8 VAGINAL DISCHARGE: Primary | ICD-10-CM

## 2024-06-04 DIAGNOSIS — B96.89 BV (BACTERIAL VAGINOSIS): Primary | ICD-10-CM

## 2024-06-04 LAB
C TRACH DNA SPEC QL NAA+PROBE: NEGATIVE
CLUE CELLS: PRESENT
N GONORRHOEA DNA SPEC QL NAA+PROBE: NEGATIVE
TRICHOMONAS, WET PREP: ABNORMAL
WBC'S/HIGH POWER FIELD, WET PREP: ABNORMAL
YEAST, WET PREP: ABNORMAL

## 2024-06-04 PROCEDURE — 87491 CHLMYD TRACH DNA AMP PROBE: CPT

## 2024-06-04 PROCEDURE — G0145 SCR C/V CYTO,THINLAYER,RESCR: HCPCS

## 2024-06-04 PROCEDURE — 87591 N.GONORRHOEAE DNA AMP PROB: CPT

## 2024-06-04 PROCEDURE — 87210 SMEAR WET MOUNT SALINE/INK: CPT

## 2024-06-04 PROCEDURE — 87624 HPV HI-RISK TYP POOLED RSLT: CPT

## 2024-06-04 PROCEDURE — 99214 OFFICE O/P EST MOD 30 MIN: CPT

## 2024-06-04 PROCEDURE — G0124 SCREEN C/V THIN LAYER BY MD: HCPCS | Performed by: PATHOLOGY

## 2024-06-04 RX ORDER — METRONIDAZOLE 500 MG/1
500 TABLET ORAL 2 TIMES DAILY
Qty: 14 TABLET | Refills: 0 | Status: SHIPPED | OUTPATIENT
Start: 2024-06-04 | End: 2024-06-11

## 2024-06-04 NOTE — PROGRESS NOTES
Subjective:  Wally is a 24 year old   is here today with the following concerns:    Vaginal symptoms: 2 weeks of increased odorous, clear discharge. She currently has Mirena IUD since  and is amenorrheic except for some mild spotting every other day over the past 1-2 weeks. She denies any fevers, pelvic pain, or pain with SIC. No new partners. Desires STI testing. She reports she has had BV in the past and feels she has odor present after SIC for the following 1-2 days.      ROS: Pertinent ROS as above.    Medical history  OB History    Para Term  AB Living   0 0 0 0 0 0   SAB IAB Ectopic Multiple Live Births   0 0 0 0 0      Past Medical History:   Diagnosis Date    Familial polyposis       Past Surgical History:   Procedure Laterality Date    COLONOSCOPY      COLONOSCOPY N/A 2020    Procedure: Colonoscopy, With Polypectomy And Biopsy;  Surgeon: Nathan Hernandez MD;  [facility]: [facility]    COLONOSCOPY WITH CO2 INSUFFLATION N/A 2020    Procedure: COLONOSCOPY, WITH CO2 INSUFFLATION;  Surgeon: Nathan Hernandez MD;  [facility]    COMBINED ESOPHAGOSCOPY, GASTROSCOPY, DUODENOSCOPY (EGD) WITH CO2 INSUFFLATION N/A 2020    Procedure: ESOPHAGOGASTRODUODENOSCOPY, WITH CO2 INSUFFLATION;  Surgeon: Nathan Hernandez MD;  [facility]    COMBINED ESOPHAGOSCOPY, GASTROSCOPY, DUODENOSCOPY (EGD) WITH CO2 INSUFFLATION N/A 2024    Procedure: Combined Esophagoscopy, Gastroscopy, Duodenoscopy (Egd) With Co2 Insufflation;  Surgeon: Itzel Scott DO;  Location: [facility]    ENDOSCOPY      ESOPHAGOSCOPY, GASTROSCOPY, DUODENOSCOPY (EGD), COMBINED N/A 2020    Procedure: Esophagogastroduodenoscopy, With Biopsy;  Surgeon: Nathan Hernandez MD;  [facility]    ESOPHAGOSCOPY, GASTROSCOPY, DUODENOSCOPY (EGD), COMBINED N/A 2024    Procedure: ESOPHAGOGASTRODUODENOSCOPY, WITH BIOPSY;  Surgeon: Itzel Scott DO;  [facility]    EXCISE GANGLION HAND  "Left 2/10/2023    Procedure: EXCISION, GANGLION CYST, left long finger;  Surgeon: TONY Wolff MD;  Location: [facility]    HEAD & NECK SURGERY  5 years ago    bump  that was removed    LAPAROSCOPIC ASSISTED COLECTOMY N/A 04/22/2021    Procedure: Laparoscopic assisted total abdominal colectomy with ileorectal anastomosis, takedown of splenic flexure;  Surgeon: Wade Maguire MD;  [facility]    OMENTECTOMY N/A 04/22/2021    Procedure: PARTIAL OMENTECTOMY;  Surgeon: Wade Maguire MD;  [facility]    SIGMOIDOSCOPY FLEXIBLE N/A 04/22/2021    Procedure: SIGMOIDOSCOPY, FLEXIBLE;  Surgeon: Wade Maguire MD;  [facility]    TONSILLECTOMY         ALL/Meds: Her medication and allergy histories were reviewed and are documented in their appropriate chart areas.    SH: Reviewed and documented in the appropriate area of the chart.  FH:  Her family history is reviewed and updated in the chart, today.  PMH: Her past medical, surgical, and obstetric histories were reviewed and updated today in the appropriate chart areas.    Objective:  PE: BP (!) 148/83 (BP Location: Left arm, Patient Position: Sitting, Cuff Size: Adult Regular)   Pulse 95   Ht 1.638 m (5' 4.5\")   LMP  (LMP Unknown)   SpO2 100%   BMI 23.15 kg/m    Body mass index is 23.15 kg/m .    Pertinent Physical exam findings:    GENERAL: Active, alert, in no acute distress.  SKIN: Clear. No significant rash, abnormal pigmentation or lesions  MS: no gross musculoskeletal defects noted, no edema  PSYCH: Age-appropriate alertness and orientation    Pelvic:       - Ext: Vulva and perineum are normal without lesion, mass or discharge        - Urethra: normal without discharge or scarring        - Bladder: no tenderness, no masses       - Vagina: with foul, grey, and milky discharge and rugated       - Cervix: normal, nulliparous, and IUD strings present at 3 cm from os. Neg CMT.       - Uterus: Normal shape, position and " consistency       - Adnexa: Normal without masses or tenderness    A/P:  Wally Varma is a 24 year old  here today with the following concerns:  (N89.8) Vaginal discharge  (primary encounter diagnosis)  Comment: Counseled on BV and that she can try vaginal boric acid after SIC to see if there is resolution of odor that she encounters. Counseled on use of boric acid vaginal suppository. Informed they can insert 600mg at bedtime into the vagina for up to 3-4 weeks and then use when warranted based on symptoms or known triggers. Educated that boric acid has bacteriostatic and fungistatic properties. Informed that boric acid is toxic and can be fatal if taken orally and advised they keep this out of reach for at-risk individuals or children. Informed that if they are pregnant or become pregnant or are breast-feeding they should not use this therapy. They are aware and understand this and agree to continue.    Plan: Chlamydia trachomatis PCR, Neisseria         gonorrhoeae PCR, Wet prep - Clinic Collect          (Z12.4) Cervical cancer screening  Plan: Pap Diagnostic with HPV          (Z11.3) Screen for STD (sexually transmitted disease)  Plan: Chlamydia trachomatis PCR, Neisseria         gonorrhoeae PCR          (N93.9) Vaginal spotting  Comment: discussed possible etiologies such as infection, cervical malignancy, result of IUD being in place. Consider TVUS if negative for infection. Informed that PE shows appropriately length of IUD strings.  Plan: Chlamydia trachomatis PCR, Neisseria         gonorrhoeae PCR, Wet prep - Clinic Collect, Pap        Diagnostic with HPV    She is agreeable to the plan above and has no further questions or concerns. She did not request a chaperone for the physical exam component of the visit today.     LUIS Thurston CNP

## 2024-06-07 ENCOUNTER — TELEPHONE (OUTPATIENT)
Dept: OBGYN | Facility: CLINIC | Age: 24
End: 2024-06-07
Payer: COMMERCIAL

## 2024-06-07 LAB
HPV HR 12 DNA CVX QL NAA+PROBE: POSITIVE
HPV16 DNA CVX QL NAA+PROBE: NEGATIVE
HPV18 DNA CVX QL NAA+PROBE: NEGATIVE
HUMAN PAPILLOMA VIRUS FINAL DIAGNOSIS: ABNORMAL

## 2024-06-07 NOTE — TELEPHONE ENCOUNTER
[facility] Call Center    Phone Message    May a detailed message be left on voicemail: yes     Reason for Call: Other: Patient called wanting to know if someone can contact her to go over her test results with her. She doesn't quite understand them. Please contact patient in regards to this message. Thank you     Action Taken: Other: [facility]    Travel Screening: Not Applicable     Date of Service:

## 2024-06-07 NOTE — TELEPHONE ENCOUNTER
Called pt back.  Pt has questions re: GYNECOLOGIC CYTOLOGY & HPV.   Advised patient those results are still in process and once those come back and reviewed by the provider we will reach back out to her with further recommendations.      Patient is asking what is HPV.  Sent info on HPV from the  [facility] clinical resources to patients my chart.     Danelle DAVILA RN

## 2024-06-11 ENCOUNTER — PATIENT OUTREACH (OUTPATIENT)
Dept: OBGYN | Facility: CLINIC | Age: 24
End: 2024-06-11
Payer: COMMERCIAL

## 2024-06-11 LAB
BKR LAB AP GYN ADEQUACY: ABNORMAL
BKR LAB AP GYN INTERPRETATION: ABNORMAL
BKR LAB AP PREVIOUS ABNL DX: ABNORMAL
BKR LAB AP PREVIOUS ABNORMAL: ABNORMAL
PATH REPORT.COMMENTS IMP SPEC: ABNORMAL
PATH REPORT.COMMENTS IMP SPEC: ABNORMAL
PATH REPORT.RELEVANT HX SPEC: ABNORMAL

## 2024-06-12 ENCOUNTER — TELEPHONE (OUTPATIENT)
Dept: FAMILY MEDICINE | Facility: CLINIC | Age: 24
End: 2024-06-12
Payer: COMMERCIAL

## 2024-06-12 ENCOUNTER — TRANSFERRED RECORDS (OUTPATIENT)
Dept: HEALTH INFORMATION MANAGEMENT | Facility: CLINIC | Age: 24
End: 2024-06-12
Payer: COMMERCIAL

## 2024-06-12 NOTE — TELEPHONE ENCOUNTER
Scheduling Team,     Please call patient to assist her in scheduling a colposcopy.      Ok to leave detailed message if patient doesn't answer.      Patient has been advised of pap results and recommendations for follow up.       Thank you,   ORION AsherN, RN-BC

## 2024-06-17 ENCOUNTER — TELEPHONE (OUTPATIENT)
Dept: OBGYN | Facility: CLINIC | Age: 24
End: 2024-06-17
Payer: COMMERCIAL

## 2024-06-17 NOTE — TELEPHONE ENCOUNTER
[facility] Call Center    Phone Message    May a detailed message be left on voicemail: yes     Reason for Call: Other: Patient calling to schedule colp in OB department. Currently scheduled for family med in October and hoping for sooner. Thank you!     Action Taken: Message routed to:  Other: [facility]    Travel Screening: Not Applicable     Date of Service:

## 2024-06-17 NOTE — TELEPHONE ENCOUNTER
Called and spoke to the patient and scheduled the Colposcopy for 10/11/2024, first available with our Provider, attached Procedure room. Patient will call OB and see if they have something sooner.  Carolynn Barraza MA  [facility]   Primary Care

## 2024-06-24 ENCOUNTER — TELEPHONE (OUTPATIENT)
Dept: OBGYN | Facility: CLINIC | Age: 24
End: 2024-06-24
Payer: COMMERCIAL

## 2024-07-15 ENCOUNTER — OFFICE VISIT (OUTPATIENT)
Dept: OBGYN | Facility: CLINIC | Age: 24
End: 2024-07-15
Payer: COMMERCIAL

## 2024-07-15 VITALS — DIASTOLIC BLOOD PRESSURE: 73 MMHG | HEART RATE: 79 BPM | RESPIRATION RATE: 100 BRPM | SYSTOLIC BLOOD PRESSURE: 111 MMHG

## 2024-07-15 DIAGNOSIS — R87.610 ASCUS WITH POSITIVE HIGH RISK HPV CERVICAL: Primary | ICD-10-CM

## 2024-07-15 DIAGNOSIS — R87.810 CERVICAL HIGH RISK HPV (HUMAN PAPILLOMAVIRUS) TEST POSITIVE: ICD-10-CM

## 2024-07-15 DIAGNOSIS — R87.810 ASCUS WITH POSITIVE HIGH RISK HPV CERVICAL: Primary | ICD-10-CM

## 2024-07-15 PROCEDURE — 57454 BX/CURETT OF CERVIX W/SCOPE: CPT | Performed by: OBSTETRICS & GYNECOLOGY

## 2024-07-15 NOTE — PROGRESS NOTES
"This 23 y/o female, , using the Mirena IUD for contraception, presents for colposcopy examination due to an abnormal co-test result on 2024.  Her pap demonstrated ASCUS changes and her DNA marker test was + for \"other\" high-risk HPV subtypes.  She is unsure if she received the Gardasil vaccine during childhood so is going to check her outside medical records.  She is not a smoker nor is she exposed to secondhand smoke.  She is taking Mirena for period control and is not due for removal she states.  Informed consent was reviewed and obtained for colposcopy examination and a UPT was not needed pre-procedurally.  /73 (BP Location: Left arm, Patient Position: Chair, Cuff Size: Adult Regular)   Pulse 79   Resp (!) 100   Breastfeeding No   ROS:  10 systems were reviewed and the positives were listed under problems.  In the lithotomy position, a bi-valve speculum was placed and her cervix was soaked with 3% acetic acid.  After several minutes, this fluid was removed and her cervix was seen in its entirety.  The SCJ was noted and at the 3 o'clock position was a localized area of acetowhite.  The white light was used at different levels of magnification followed by use of the green filter but no vascular areas were noted.  The endocervical curettings were obtained and submitted without difficulty and without displacement of her current IUD.  The IUD strings were visible throughout the case and remained the same length.  Next, an ectocervical biopsy was obtained from the 3 o'clock position of the cervix and submitted.  She tolerated the procedure well and there were no complications.  Silver nitrate sticks were used to achieve excellent hemostasis.  EBL was 1 mL and all instruments were removed.  Counts were correct and follow up care and instructions were reviewed.  Assessment - Colposcopy examination with biopsies due to an abnormal co-test result  Plan - Submit the two cervical biopsies and treat if " indicated.  Follow up care and instructions were reviewed with the patient and all her questions and concerns were addressed.  20 minutes were spent today in chart review, the patient visit, review of tests, and documentation in regard to the issues noted above.  This did NOT include the time spent in the procedure (colposcopy with two biopsies).

## 2024-07-15 NOTE — PATIENT INSTRUCTIONS
If you have labs or imaging done, the results will automatically release in [facility] without an interpretation.  Your health care professional will review those results and send an interpretation with recommendations as soon as possible, but this may be 1-3 business days.    If you have any questions regarding your visit, please contact your care team.     [employer] [facility]: [phone]  [facility] CLINIC HOURS TELEPHONE NUMBER   DO. Ale Xiao -Surgery Scheduler  Sherry -     KIMBERLY Syed RN Kylie, RN [place]    Monday 8:30 am-5:00 pm  Wednesday 8:30 am-5:00 pm  Friday 8:30 am-5:00 pm    Typical Surgery day: Tuesday [facility]  [address] [place] [address]  Phone:  [phone]  Fax: [phone]   Appointment [phone]    Imaging Scheduling-All Locations [phone]    [facility]  [address] [place] [address] [place] [address] [phone]   **Surgeries** Our Surgery Schedulers will contact you to schedule. If you do not receive a call within 3 business days, please call [phone].  Urgent Care locations:  [facility] Monday-Friday   10 am - 8 pm  Saturday and    9 am - 5 pm [phone]   If you need a medication refill, please contact your pharmacy. Please allow 3 business days for your refill to be completed.  As always, Thank you for trusting us with your healthcare needs!  see additional instructions from your care team below

## 2024-07-16 PROCEDURE — 88305 TISSUE EXAM BY PATHOLOGIST: CPT | Performed by: PATHOLOGY

## 2024-07-24 ENCOUNTER — MYC MEDICAL ADVICE (OUTPATIENT)
Dept: OBGYN | Facility: CLINIC | Age: 24
End: 2024-07-24
Payer: COMMERCIAL

## 2024-07-24 NOTE — TELEPHONE ENCOUNTER
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  However, a recheck pap in 1 year is advised for follow up rather than in 3 years.  Please let me know if you have any questions\"      "

## 2024-08-05 ENCOUNTER — HOSPITAL ENCOUNTER (EMERGENCY)
Facility: CLINIC | Age: 24
Discharge: LEFT BEFORE DISCHARGE | End: 2024-08-06
Admitting: STUDENT IN AN ORGANIZED HEALTH CARE EDUCATION/TRAINING PROGRAM
Payer: COMMERCIAL

## 2024-08-05 ENCOUNTER — PATIENT OUTREACH (OUTPATIENT)
Dept: OBGYN | Facility: CLINIC | Age: 24
End: 2024-08-05
Payer: COMMERCIAL

## 2024-08-05 VITALS
SYSTOLIC BLOOD PRESSURE: 127 MMHG | RESPIRATION RATE: 18 BRPM | DIASTOLIC BLOOD PRESSURE: 62 MMHG | OXYGEN SATURATION: 98 % | HEART RATE: 92 BPM | TEMPERATURE: 99 F

## 2024-08-05 PROCEDURE — 99281 EMR DPT VST MAYX REQ PHY/QHP: CPT

## 2024-08-05 ASSESSMENT — ACTIVITIES OF DAILY LIVING (ADL): ADLS_ACUITY_SCORE: 38

## 2024-08-05 ASSESSMENT — COLUMBIA-SUICIDE SEVERITY RATING SCALE - C-SSRS
6. HAVE YOU EVER DONE ANYTHING, STARTED TO DO ANYTHING, OR PREPARED TO DO ANYTHING TO END YOUR LIFE?: NO
2. HAVE YOU ACTUALLY HAD ANY THOUGHTS OF KILLING YOURSELF IN THE PAST MONTH?: NO
1. IN THE PAST MONTH, HAVE YOU WISHED YOU WERE DEAD OR WISHED YOU COULD GO TO SLEEP AND NOT WAKE UP?: NO

## 2024-08-06 ENCOUNTER — HOSPITAL ENCOUNTER (EMERGENCY)
Facility: CLINIC | Age: 24
Discharge: HOME | End: 2024-08-06
Attending: STUDENT IN AN ORGANIZED HEALTH CARE EDUCATION/TRAINING PROGRAM | Admitting: STUDENT IN AN ORGANIZED HEALTH CARE EDUCATION/TRAINING PROGRAM
Payer: COMMERCIAL

## 2024-08-06 ENCOUNTER — APPOINTMENT (OUTPATIENT)
Dept: MRI IMAGING | Facility: CLINIC | Age: 24
End: 2024-08-06
Attending: STUDENT IN AN ORGANIZED HEALTH CARE EDUCATION/TRAINING PROGRAM
Payer: COMMERCIAL

## 2024-08-06 VITALS
TEMPERATURE: 97.8 F | HEIGHT: 64 IN | BODY MASS INDEX: 24.13 KG/M2 | OXYGEN SATURATION: 100 % | WEIGHT: 141.31 LBS | RESPIRATION RATE: 18 BRPM | HEART RATE: 92 BPM | SYSTOLIC BLOOD PRESSURE: 128 MMHG | DIASTOLIC BLOOD PRESSURE: 81 MMHG

## 2024-08-06 DIAGNOSIS — R29.898 LEFT LEG WEAKNESS: Primary | ICD-10-CM

## 2024-08-06 DIAGNOSIS — R20.0 LEFT LEG NUMBNESS: ICD-10-CM

## 2024-08-06 DIAGNOSIS — M54.50 ACUTE BILATERAL LOW BACK PAIN WITHOUT SCIATICA: ICD-10-CM

## 2024-08-06 LAB
ALBUMIN SERPL BCG-MCNC: 4.6 G/DL (ref 3.5–5.2)
ALP SERPL-CCNC: 75 U/L (ref 40–150)
ALT SERPL W P-5'-P-CCNC: 16 U/L (ref 0–50)
ANION GAP SERPL CALCULATED.3IONS-SCNC: 12 MMOL/L (ref 7–15)
AST SERPL W P-5'-P-CCNC: 23 U/L (ref 0–45)
BASOPHILS # BLD AUTO: 0 10E3/UL (ref 0–0.2)
BASOPHILS NFR BLD AUTO: 0 %
BILIRUB SERPL-MCNC: 0.5 MG/DL
BUN SERPL-MCNC: 10.7 MG/DL (ref 6–20)
CALCIUM SERPL-MCNC: 8.9 MG/DL (ref 8.8–10.4)
CHLORIDE SERPL-SCNC: 101 MMOL/L (ref 98–107)
CK SERPL-CCNC: 68 U/L (ref 26–192)
CREAT SERPL-MCNC: 0.58 MG/DL (ref 0.51–0.95)
EGFRCR SERPLBLD CKD-EPI 2021: >90 ML/MIN/1.73M2
EOSINOPHIL # BLD AUTO: 0 10E3/UL (ref 0–0.7)
EOSINOPHIL NFR BLD AUTO: 0 %
ERYTHROCYTE [DISTWIDTH] IN BLOOD BY AUTOMATED COUNT: 11.9 % (ref 10–15)
GLUCOSE SERPL-MCNC: 98 MG/DL (ref 70–99)
HCO3 SERPL-SCNC: 22 MMOL/L (ref 22–29)
HCT VFR BLD AUTO: 40.4 % (ref 35–47)
HGB BLD-MCNC: 13.5 G/DL (ref 11.7–15.7)
IMM GRANULOCYTES # BLD: 0 10E3/UL
IMM GRANULOCYTES NFR BLD: 0 %
LYMPHOCYTES # BLD AUTO: 1.8 10E3/UL (ref 0.8–5.3)
LYMPHOCYTES NFR BLD AUTO: 18 %
MCH RBC QN AUTO: 31.6 PG (ref 26.5–33)
MCHC RBC AUTO-ENTMCNC: 33.4 G/DL (ref 31.5–36.5)
MCV RBC AUTO: 95 FL (ref 78–100)
MONOCYTES # BLD AUTO: 0.6 10E3/UL (ref 0–1.3)
MONOCYTES NFR BLD AUTO: 6 %
NEUTROPHILS # BLD AUTO: 7.5 10E3/UL (ref 1.6–8.3)
NEUTROPHILS NFR BLD AUTO: 76 %
NRBC # BLD AUTO: 0 10E3/UL
NRBC BLD AUTO-RTO: 0 /100
PLATELET # BLD AUTO: 222 10E3/UL (ref 150–450)
POTASSIUM SERPL-SCNC: 3.8 MMOL/L (ref 3.4–5.3)
PROT SERPL-MCNC: 7.9 G/DL (ref 6.4–8.3)
RBC # BLD AUTO: 4.27 10E6/UL (ref 3.8–5.2)
SODIUM SERPL-SCNC: 135 MMOL/L (ref 135–145)
WBC # BLD AUTO: 9.9 10E3/UL (ref 4–11)

## 2024-08-06 PROCEDURE — 36415 COLL VENOUS BLD VENIPUNCTURE: CPT | Performed by: STUDENT IN AN ORGANIZED HEALTH CARE EDUCATION/TRAINING PROGRAM

## 2024-08-06 PROCEDURE — 82550 ASSAY OF CK (CPK): CPT | Performed by: STUDENT IN AN ORGANIZED HEALTH CARE EDUCATION/TRAINING PROGRAM

## 2024-08-06 PROCEDURE — 85025 COMPLETE CBC W/AUTO DIFF WBC: CPT | Performed by: STUDENT IN AN ORGANIZED HEALTH CARE EDUCATION/TRAINING PROGRAM

## 2024-08-06 PROCEDURE — 72148 MRI LUMBAR SPINE W/O DYE: CPT

## 2024-08-06 PROCEDURE — 80053 COMPREHEN METABOLIC PANEL: CPT | Performed by: STUDENT IN AN ORGANIZED HEALTH CARE EDUCATION/TRAINING PROGRAM

## 2024-08-06 PROCEDURE — 99284 EMERGENCY DEPT VISIT MOD MDM: CPT | Mod: 25

## 2024-08-06 ASSESSMENT — ACTIVITIES OF DAILY LIVING (ADL)
ADLS_ACUITY_SCORE: 36
ADLS_ACUITY_SCORE: 38
ADLS_ACUITY_SCORE: 36
ADLS_ACUITY_SCORE: 38
ADLS_ACUITY_SCORE: 36

## 2024-08-06 ASSESSMENT — COLUMBIA-SUICIDE SEVERITY RATING SCALE - C-SSRS
2. HAVE YOU ACTUALLY HAD ANY THOUGHTS OF KILLING YOURSELF IN THE PAST MONTH?: NO
1. IN THE PAST MONTH, HAVE YOU WISHED YOU WERE DEAD OR WISHED YOU COULD GO TO SLEEP AND NOT WAKE UP?: NO
6. HAVE YOU EVER DONE ANYTHING, STARTED TO DO ANYTHING, OR PREPARED TO DO ANYTHING TO END YOUR LIFE?: NO

## 2024-08-06 NOTE — DISCHARGE INSTRUCTIONS
Thank you for allowing us to evaluate you today.  Follow up with neurology in 1-2 weeks for reevaluation..  Please read the guidance provided with your discharge instructions.  Immediately return to the emergency department with any concerns.

## 2024-08-06 NOTE — ED PROVIDER NOTES
"  Emergency Department Note      History of Present Illness     Chief Complaint   Leg Pain, Back Pain, and Numbness      HPI   Wally Varma is a pleasant 24 year old female with a history of FAP presenting with leg numbness and back pain. The patient reports she has had intermittent left leg numbness, weakness, and tingling. She states it went away briefly two days ago when she was resting but started again yesterday when she went to work. She reports the tingling is only above the lateral left kneecap but the numbness is from her left ankle to her left buttock. She states she walks with a limp and feels like her leg is \"not there\". She notes she has never had this condition before. She reports yesterday she had onset of back pain and migraines which is not normal for her. She notes she got bitten by bugs on her left leg five days ago. She states she sustained a very sore bite on the lateral left thigh, a bite inferior to the left knee on the medial side, and one posterior above her ankle. She denies aciddent/injury, foot numbness, fever, drug use, or recent outdoor activities. Of note, the patient has FAP and her colon was removed in 2021 for precancerous polyps. She states she had her yearly check-up for FAP and findings were unremarkable.    Independent Historian   None    Review of External Notes   I personally reviewed notes from the patient's  office visit  dated  07/15/24 . This provided me with information regarding patient's baseline medical problems.     I personally reviewed the patient's chart, including available medication list and available past medical history, past surgical history, family history, and social history.    Physical Exam     Patient Vitals for the past 24 hrs:   BP Temp Temp src Pulse Resp SpO2 Height Weight   08/06/24 1132 128/81 -- -- 92 18 100 % -- --   08/06/24 0817 133/83 97.8  F (36.6  C) Oral 82 20 100 % 1.626 m (5' 4\") 64.1 kg (141 lb 5 oz)      Physical Exam  Vitals and " nursing note reviewed.   Constitutional:       Appearance: Normal appearance. She is not ill-appearing or diaphoretic.   Eyes:      Extraocular Movements: Extraocular movements intact.   Cardiovascular:      Rate and Rhythm: Normal rate and regular rhythm.   Pulmonary:      Effort: Pulmonary effort is normal.   Musculoskeletal:      Thoracic back: No tenderness or bony tenderness.      Lumbar back: Tenderness and bony tenderness present.   Skin:     General: Skin is warm and dry.   Neurological:      Mental Status: She is alert and oriented to person, place, and time.      Cranial Nerves: No cranial nerve deficit.      Sensory: Sensory deficit (Patient reports decreased sensation grossly light touch throughout left lower extremity) present.      Motor: Weakness (Patient has weakness in flexors and extensors of left lower extremity) present.      Gait: Gait normal.          Diagnostics     Lab Results   Labs Ordered and Resulted from Time of ED Arrival to Time of ED Departure   COMPREHENSIVE METABOLIC PANEL - Normal       Result Value    Sodium 135      Potassium 3.8      Carbon Dioxide (CO2) 22      Anion Gap 12      Urea Nitrogen 10.7      Creatinine 0.58      GFR Estimate >90      Calcium 8.9      Chloride 101      Glucose 98      Alkaline Phosphatase 75      AST 23      ALT 16      Protein Total 7.9      Albumin 4.6      Bilirubin Total 0.5     CK TOTAL - Normal    CK 68     CBC WITH PLATELETS AND DIFFERENTIAL    WBC Count 9.9      RBC Count 4.27      Hemoglobin 13.5      Hematocrit 40.4      MCV 95      MCH 31.6      MCHC 33.4      RDW 11.9      Platelet Count 222      % Neutrophils 76      % Lymphocytes 18      % Monocytes 6      % Eosinophils 0      % Basophils 0      % Immature Granulocytes 0      NRBCs per 100 WBC 0      Absolute Neutrophils 7.5      Absolute Lymphocytes 1.8      Absolute Monocytes 0.6      Absolute Eosinophils 0.0      Absolute Basophils 0.0      Absolute Immature Granulocytes 0.0       Absolute NRBCs 0.0         Imaging   Lumbar spine MRI w/o contrast   Final Result   IMPRESSION:     1. No evidence of disc herniation.   2. No foraminal or spinal canal stenosis.      ADELIA CASTILLO MD            SYSTEM ID:  [id number]          EKG  No ECG performed.     Independent Interpretation   See ED Course below    ED Course      Medications Administered   Medications - No data to display    Procedures   None performed    Discussion of Management   See ED Course below    Social Determinants of Health adding to complexity of care   None.      ED Course   Independent Interpretation / Discussion of Management / Repeat Assessments  ED Course as of 08/06/24 1136   Tue Aug 06, 2024   0827 I obtained history and examined the patient as noted above.   1123 I rechecked the patient and explained findings.       Medical Decision Making / Diagnosis     CMS Diagnoses: None    MIPS       CT/MRI of the lumbar spine was obtained because of neurologic signs or symptoms (lower extremity weakness).    MDM   Patient presenting with left leg numbness and tingling.  Vital signs are reassuring.  Patient has notable weakness throughout left lower extremity on exam.  She does not have any complete sensory loss but sensation feels abnormal throughout the left lower extremity.  Unusual presentation.  Considered differential including peripheral neuropathy, myopathy, lumbar spinal pathology, multiple sclerosis, among others.  Low suspicion for central process.  Low suspicion for Guillain-Barré syndrome given unilateral, non-ascending deficit.  Labs including CK, electrolytes, white blood cell count, was unremarkable.  I did obtain an MRI of the lumbar spine given objective neurologic deficit.  This was reassuring.  With these findings and 4 days of symptoms, feel patient is appropriate for outpatient follow-up.  Recommended neurology follow-up.  Referral placed.  Return precautions provided.    Disposition   The patient was discharged.      Diagnosis     ICD-10-CM    1. Left leg weakness  R29.898 Adult Neurology  Referral      2. Left leg numbness  R20.0 Adult Neurology  Referral      3. Acute bilateral low back pain without sciatica  M54.50            Discharge Medications   Discharge Medication List as of 8/6/2024 11:26 AM             Khris Vazquez MD  08/06/24 1136

## 2024-08-06 NOTE — ED TRIAGE NOTES
Arrives with complaints of left leg numbness and tingling for 4 days as well as low back pain and headache. Alert and oriented, ABCs intact.     Triage Assessment (Adult)       Row Name 08/06/24 0818          Triage Assessment    Airway WDL WDL        Respiratory WDL    Respiratory WDL WDL        Skin Circulation/Temperature WDL    Skin Circulation/Temperature WDL WDL        Cardiac WDL    Cardiac WDL WDL        Peripheral/Neurovascular WDL    Peripheral Neurovascular WDL WDL        Cognitive/Neuro/Behavioral WDL    Cognitive/Neuro/Behavioral WDL WDL

## 2024-08-06 NOTE — ED TRIAGE NOTES
Pt reports leg tingling/numbness that started Friday and today began to have 8/10 lower back pain and also began to have migraine symptoms. Denies bowel or bladder issues.      Triage Assessment (Adult)       Row Name 08/05/24 8643          Triage Assessment    Airway WDL WDL        Respiratory WDL    Respiratory WDL WDL        Skin Circulation/Temperature WDL    Skin Circulation/Temperature WDL WDL        Cardiac WDL    Cardiac WDL WDL        Peripheral/Neurovascular WDL    Peripheral Neurovascular WDL WDL        Cognitive/Neuro/Behavioral WDL    Cognitive/Neuro/Behavioral WDL WDL

## 2024-08-12 ENCOUNTER — PATIENT OUTREACH (OUTPATIENT)
Dept: CARE COORDINATION | Facility: CLINIC | Age: 24
End: 2024-08-12
Payer: COMMERCIAL

## 2024-08-12 NOTE — PROGRESS NOTES
[facility] Care Resource Center Contact  [facility]/Voicemail     Clinical Data: Care Coordination ED-sourced Outreach-     Outreach attempted x 2.  Left message on patient's voicemail, providing [facility]'s 24/7 scheduling and nurse triage phone number [phone] ([phone]) for questions/concerns and/or to schedule an appt with an [facility] provider.      Care Coordination introduction letter with explanation of Clinic Care Coordination services sent to patient via [employer]. Clinic Care Coordination services remain available via referral if needed.    Plan: [facility] will do no further outreaches at this time.       NICOLLE Garner  Connected Care Resource [place], [facility]    *Connected Care Resource Team does NOT follow patient ongoing. Referrals are identified based on internal discharge reports and the outreach is to ensure patient has an understanding of their discharge instructions.

## 2024-08-12 NOTE — LETTER
Wally Varma  [address]  [place] [address]    Dear Wally Varma,      I am a team member within the [facility] Care Resource Center with [facility]. I recently tried to reach you to ensure you were doing well following a recent visit within our health system. I also wanted to take this chance to introduce Clinic Care Coordination.     Below is a description of Clinic Care Coordination and how this team can further assist you:       The Clinic Care Coordination team is made up of a Registered Nurse, , Financial Resource Worker, and a Community Health Worker who understand and can help navigate the health care system. The goal of clinic care coordination is to help you manage your health, improve access to care, and achieve optimal health outcomes. They work alongside your provider to assist you in determining your health and social needs, obtain health care and community resources, and provide you with necessary information and education. Clinic Care Coordination can work with you through any barriers and develop a care plan that helps coordinate and strengthen the relationship between you and your care team.    If you wish to connect with the Clinic Care Coordination Team, please let your [facility] Primary Care Provider or Clinic Care Team know and they can place a referral. The Clinic Care Coordination team will then reach out by phone to further support you.    We are focused on providing you with the highest-quality healthcare experience possible.    Sincerely,   Your care team with [facility]

## 2024-09-10 ENCOUNTER — PATIENT OUTREACH (OUTPATIENT)
Dept: CARE COORDINATION | Facility: CLINIC | Age: 24
End: 2024-09-10
Payer: COMMERCIAL

## 2024-09-24 ENCOUNTER — PATIENT OUTREACH (OUTPATIENT)
Dept: CARE COORDINATION | Facility: CLINIC | Age: 24
End: 2024-09-24
Payer: COMMERCIAL

## 2024-11-21 ENCOUNTER — OFFICE VISIT (OUTPATIENT)
Dept: URGENT CARE | Facility: URGENT CARE | Age: 24
End: 2024-11-21
Payer: COMMERCIAL

## 2024-11-21 ENCOUNTER — ANCILLARY PROCEDURE (OUTPATIENT)
Dept: GENERAL RADIOLOGY | Facility: CLINIC | Age: 24
End: 2024-11-21
Attending: PHYSICIAN ASSISTANT
Payer: COMMERCIAL

## 2024-11-21 VITALS
RESPIRATION RATE: 18 BRPM | OXYGEN SATURATION: 96 % | HEART RATE: 107 BPM | WEIGHT: 139 LBS | BODY MASS INDEX: 23.86 KG/M2 | TEMPERATURE: 98.2 F | DIASTOLIC BLOOD PRESSURE: 89 MMHG | SYSTOLIC BLOOD PRESSURE: 150 MMHG

## 2024-11-21 DIAGNOSIS — R07.89 CHEST TIGHTNESS: ICD-10-CM

## 2024-11-21 DIAGNOSIS — R06.02 SOB (SHORTNESS OF BREATH): ICD-10-CM

## 2024-11-21 DIAGNOSIS — R06.02 SOB (SHORTNESS OF BREATH): Primary | ICD-10-CM

## 2024-11-21 RX ORDER — DEXAMETHASONE SODIUM PHOSPHATE 10 MG/ML
10 INJECTION INTRAMUSCULAR; INTRAVENOUS ONCE
Status: COMPLETED | OUTPATIENT
Start: 2024-11-21 | End: 2024-11-21

## 2024-11-21 RX ORDER — IPRATROPIUM BROMIDE AND ALBUTEROL SULFATE 2.5; .5 MG/3ML; MG/3ML
3 SOLUTION RESPIRATORY (INHALATION) ONCE
Status: COMPLETED | OUTPATIENT
Start: 2024-11-21 | End: 2024-11-21

## 2024-11-21 RX ORDER — PREDNISONE 20 MG/1
40 TABLET ORAL DAILY
Qty: 10 TABLET | Refills: 0 | Status: SHIPPED | OUTPATIENT
Start: 2024-11-21 | End: 2024-11-26

## 2024-11-21 RX ORDER — IPRATROPIUM BROMIDE AND ALBUTEROL SULFATE 2.5; .5 MG/3ML; MG/3ML
1 SOLUTION RESPIRATORY (INHALATION) EVERY 6 HOURS PRN
Qty: 90 ML | Refills: 1 | Status: SHIPPED | OUTPATIENT
Start: 2024-11-21

## 2024-11-21 RX ADMIN — DEXAMETHASONE SODIUM PHOSPHATE 10 MG: 10 INJECTION INTRAMUSCULAR; INTRAVENOUS at 19:20

## 2024-11-21 RX ADMIN — IPRATROPIUM BROMIDE AND ALBUTEROL SULFATE 3 ML: 2.5; .5 SOLUTION RESPIRATORY (INHALATION) at 19:20

## 2024-11-21 ASSESSMENT — ENCOUNTER SYMPTOMS
MYALGIAS: 0
BACK PAIN: 0
LIGHT-HEADEDNESS: 0
SORE THROAT: 0
NECK STIFFNESS: 0
MUSCULOSKELETAL NEGATIVE: 1
EYES NEGATIVE: 1
NECK PAIN: 0
WOUND: 0
CHILLS: 0
NAUSEA: 0
ENDOCRINE NEGATIVE: 1
ARTHRALGIAS: 0
COUGH: 0
FEVER: 0
HEADACHES: 0
RHINORRHEA: 0
DIZZINESS: 0
PALPITATIONS: 0
WEAKNESS: 0
JOINT SWELLING: 0
SHORTNESS OF BREATH: 1
VOMITING: 0
ALLERGIC/IMMUNOLOGIC NEGATIVE: 1
DIARRHEA: 0
CARDIOVASCULAR NEGATIVE: 1

## 2024-11-21 ASSESSMENT — PAIN SCALES - GENERAL: PAINLEVEL_OUTOF10: NO PAIN (0)

## 2024-11-22 NOTE — NURSING NOTE
Clinic Administered Medication Documentation    Patient was given DUONEB and Decadron. Prior to medication administration, verified patient's identity using patient's name and date of birth.    Layla Medina RN
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
## 2024-11-30 ENCOUNTER — OFFICE VISIT (OUTPATIENT)
Dept: URGENT CARE | Facility: URGENT CARE | Age: 24
End: 2024-11-30
Payer: COMMERCIAL

## 2024-11-30 VITALS
WEIGHT: 143.2 LBS | TEMPERATURE: 98 F | OXYGEN SATURATION: 98 % | HEART RATE: 95 BPM | SYSTOLIC BLOOD PRESSURE: 115 MMHG | RESPIRATION RATE: 16 BRPM | DIASTOLIC BLOOD PRESSURE: 54 MMHG | BODY MASS INDEX: 24.58 KG/M2

## 2024-11-30 DIAGNOSIS — S31.41XA: Primary | ICD-10-CM

## 2024-11-30 PROCEDURE — 99213 OFFICE O/P EST LOW 20 MIN: CPT | Performed by: PHYSICIAN ASSISTANT

## 2024-11-30 RX ORDER — MUPIROCIN 20 MG/G
OINTMENT TOPICAL 3 TIMES DAILY
Qty: 15 G | Refills: 0 | Status: SHIPPED | OUTPATIENT
Start: 2024-11-30 | End: 2024-12-07

## 2024-11-30 ASSESSMENT — PAIN SCALES - GENERAL: PAINLEVEL_OUTOF10: SEVERE PAIN (6)

## 2024-11-30 NOTE — PROGRESS NOTES
HPI:    Wally Varma is an 24 year old female who presents for evaluation of   Painful lumbar lesion right perineum about 48 hours ago.  Believes she may have cut herself shaving.  No fever or chills.  Urine hurts when it hits that area.    Exam:    /54   Pulse 95   Temp 98  F (36.7  C) (Tympanic)   Resp 16   Wt 65 kg (143 lb 3.2 oz)   SpO2 98%   BMI 24.58 kg/m      Gen: Healthy appearing female in no acute distress  Genital: Inferior aspect of patient's right labia majora is with a small tiny 3 to 4 mm cut in the crevice.  Surrounding skin is mildly red.  Some chafing.  Seems to be right over the underwear line.  No palpable abscess or visible lumps/bumps or ulcerative lesions.      ASSESSMENT:    ICD-10-CM    1. Perineal laceration of skin, initial encounter  S31.41XA mupirocin (BACTROBAN) 2 % external ointment            PLAN: Small perineal laceration, likely from shaving.  Topical Bactroban.  Follow-up with primary care provider if any concerns.

## 2024-12-01 ENCOUNTER — HEALTH MAINTENANCE LETTER (OUTPATIENT)
Age: 24
End: 2024-12-01

## 2025-04-01 ENCOUNTER — OFFICE VISIT (OUTPATIENT)
Dept: FAMILY MEDICINE | Facility: CLINIC | Age: 25
End: 2025-04-01
Payer: COMMERCIAL

## 2025-04-01 VITALS
RESPIRATION RATE: 16 BRPM | TEMPERATURE: 98.5 F | BODY MASS INDEX: 25.27 KG/M2 | WEIGHT: 148 LBS | HEART RATE: 82 BPM | DIASTOLIC BLOOD PRESSURE: 74 MMHG | SYSTOLIC BLOOD PRESSURE: 135 MMHG | OXYGEN SATURATION: 98 % | HEIGHT: 64 IN

## 2025-04-01 DIAGNOSIS — N89.8 VAGINAL LESION: ICD-10-CM

## 2025-04-01 DIAGNOSIS — Z23 ENCOUNTER FOR IMMUNIZATION: ICD-10-CM

## 2025-04-01 DIAGNOSIS — N89.8 VAGINAL DISCHARGE: Primary | ICD-10-CM

## 2025-04-01 LAB
CLUE CELLS: ABNORMAL
TRICHOMONAS, WET PREP: ABNORMAL
WBC'S/HIGH POWER FIELD, WET PREP: ABNORMAL
YEAST, WET PREP: ABNORMAL

## 2025-04-01 PROCEDURE — 87210 SMEAR WET MOUNT SALINE/INK: CPT | Performed by: FAMILY MEDICINE

## 2025-04-01 PROCEDURE — 86695 HERPES SIMPLEX TYPE 1 TEST: CPT | Performed by: FAMILY MEDICINE

## 2025-04-01 PROCEDURE — 86696 HERPES SIMPLEX TYPE 2 TEST: CPT | Performed by: FAMILY MEDICINE

## 2025-04-01 PROCEDURE — 3078F DIAST BP <80 MM HG: CPT | Performed by: FAMILY MEDICINE

## 2025-04-01 PROCEDURE — 36415 COLL VENOUS BLD VENIPUNCTURE: CPT | Performed by: FAMILY MEDICINE

## 2025-04-01 PROCEDURE — 90471 IMMUNIZATION ADMIN: CPT | Performed by: FAMILY MEDICINE

## 2025-04-01 PROCEDURE — 87529 HSV DNA AMP PROBE: CPT | Performed by: FAMILY MEDICINE

## 2025-04-01 PROCEDURE — 3075F SYST BP GE 130 - 139MM HG: CPT | Performed by: FAMILY MEDICINE

## 2025-04-01 PROCEDURE — 99214 OFFICE O/P EST MOD 30 MIN: CPT | Mod: 25 | Performed by: FAMILY MEDICINE

## 2025-04-01 PROCEDURE — 90651 9VHPV VACCINE 2/3 DOSE IM: CPT | Performed by: FAMILY MEDICINE

## 2025-04-01 RX ORDER — VALACYCLOVIR HYDROCHLORIDE 1 G/1
1000 TABLET, FILM COATED ORAL 3 TIMES DAILY
Qty: 21 TABLET | Refills: 0 | Status: SHIPPED | OUTPATIENT
Start: 2025-04-01 | End: 2025-04-08

## 2025-04-01 NOTE — PROGRESS NOTES
"  Assessment & Plan     (N89.8) Vaginal discharge  (primary encounter diagnosis)  Comment:   Plan: Wet prep - Clinic Collect            (N89.8) Vaginal lesion  Comment: blister next to labia majora ,    3 blisters noted , discussed possibility for herpes , as rash itchy and blisters noted .   We discussed treatment option with valtrex   Plan: Herpes Simplex Virus 1 and 2 IgG, valACYclovir         (VALTREX) 1000 mg tablet, Herpes Simplex Virus         1&2 by PCR, CANCELED: HC HSV 1&2 BY PCR SWAB            (Z23) Encounter for immunization  Comment:   Plan: HPV 9Y+ (Gardasil 9)                  BMI  Estimated body mass index is 25.4 kg/m  as calculated from the following:    Height as of this encounter: 1.626 m (5' 4\").    Weight as of this encounter: 67.1 kg (148 lb).   Weight management plan: Discussed healthy diet and exercise guidelines        James Marr is a 24 year old, presenting for the following health issues:  Derm Problem (- rash/itching starting today; located on buttock)        4/1/2025    10:36 AM   Additional Questions   Roomed by CHEYENNE Purvis   Accompanied by Self     History of Present Illness       Reason for visit:  Rash/irchjng flare up concerns  Symptom onset:  Today  Symptoms include:  Itching, little bumps  Symptom intensity:  Severe  Symptom progression:  Worsening  Had these symptoms before:  No  What makes it worse:  Stress  What makes it better:  N/a She is missing 3 dose(s) of medications per week.  She is not taking prescribed medications regularly due to remembering to take.        Rash  Onset/Duration: starting today  Description  Location: buttock   Character: little bumps, itching; red  Itching: moderate  Intensity:  moderate  Progression of Symptoms:  worsening  Accompanying signs and symptoms:   Fever: No  Body aches or joint pain: No  Sore throat symptoms: No  Recent cold symptoms: No  History:           Previous episodes of similar rash: None  New exposures:  " "None  Recent travel: No  Exposure to similar rash: No  Precipitating or alleviating factors: N/A  Therapies tried and outcome: none      Review of Systems  Constitutional, HEENT, cardiovascular, pulmonary, GI, , musculoskeletal, neuro, skin, endocrine and psych systems are negative, except as otherwise noted.      Objective    /74 (BP Location: Right arm, Patient Position: Sitting, Cuff Size: Adult Large)   Pulse 82   Temp 98.5  F (36.9  C) (Oral)   Resp 16   Ht 1.626 m (5' 4\")   Wt 67.1 kg (148 lb)   LMP 03/25/2025 (Exact Date)   SpO2 98%   Breastfeeding No   BMI 25.40 kg/m    Body mass index is 25.4 kg/m .  Physical Exam   GENERAL: alert and no distress  RESP: lungs clear to auscultation - no rales, rhonchi or wheezes  CV: regular rate and rhythm, normal S1 S2, no S3 or S4, no murmur, click or rub, no peripheral edema  ABDOMEN: soft, nontender, no hepatosplenomegaly, no masses and bowel sounds normal  MS: no gross musculoskeletal defects noted, no edema  SKIN: no suspicious lesions or rashes  Vaginal - skin blisters with redness and itchy lesion .   Swab taken .           Signed Electronically by: Harriet Simmons MD    Answers submitted by the patient for this visit:  General Questionnaire (Submitted on 4/1/2025)  Chief Complaint: Chronic problems general questions HPI Form  How many days per week do you miss taking your medication?: 3  What makes it hard for you to take your medication every day?: remembering to take  General Concern (Submitted on 4/1/2025)  Chief Complaint: Chronic problems general questions HPI Form  What is the reason for your visit today?: Rash/irchjng flare up concerns  When did your symptoms begin?: Today  What are your symptoms?: Itching, little bumps  How would you describe these symptoms?: Severe  Are your symptoms:: Worsening  Have you had these symptoms before?: No  Is there anything that makes you feel worse?: Stress  Is there anything that makes you feel better?: " N/a  Questionnaire about: Chronic problems general questions HPI Form (Submitted on 4/1/2025)  Chief Complaint: Chronic problems general questions HPI Form

## 2025-04-02 DIAGNOSIS — A60.04 HERPES SIMPLEX VULVOVAGINITIS: Primary | ICD-10-CM

## 2025-04-02 LAB
HSV1 DNA SPEC QL NAA+PROBE: NOT DETECTED
HSV1 IGG SERPL QL IA: 0.62 INDEX
HSV1 IGG SERPL QL IA: ABNORMAL
HSV2 DNA SPEC QL NAA+PROBE: DETECTED
HSV2 IGG SERPL QL IA: 3.19 INDEX
HSV2 IGG SERPL QL IA: ABNORMAL
SPECIMEN TYPE: ABNORMAL

## 2025-04-02 RX ORDER — VALACYCLOVIR HYDROCHLORIDE 500 MG/1
500 TABLET, FILM COATED ORAL DAILY
Qty: 90 TABLET | Refills: 1 | Status: SHIPPED | OUTPATIENT
Start: 2025-04-02

## 2025-04-08 ENCOUNTER — PATIENT OUTREACH (OUTPATIENT)
Dept: CARE COORDINATION | Facility: CLINIC | Age: 25
End: 2025-04-08
Payer: COMMERCIAL

## 2025-05-20 ENCOUNTER — PATIENT OUTREACH (OUTPATIENT)
Dept: CARE COORDINATION | Facility: CLINIC | Age: 25
End: 2025-05-20
Payer: COMMERCIAL

## 2025-05-20 DIAGNOSIS — D13.91 FAP (FAMILIAL ADENOMATOUS POLYPOSIS): Primary | ICD-10-CM

## 2025-06-23 SDOH — HEALTH STABILITY: PHYSICAL HEALTH: ON AVERAGE, HOW MANY DAYS PER WEEK DO YOU ENGAGE IN MODERATE TO STRENUOUS EXERCISE (LIKE A BRISK WALK)?: 3 DAYS

## 2025-06-23 SDOH — HEALTH STABILITY: PHYSICAL HEALTH: ON AVERAGE, HOW MANY MINUTES DO YOU ENGAGE IN EXERCISE AT THIS LEVEL?: 30 MIN

## 2025-06-23 ASSESSMENT — PATIENT HEALTH QUESTIONNAIRE - PHQ9
SUM OF ALL RESPONSES TO PHQ QUESTIONS 1-9: 8
SUM OF ALL RESPONSES TO PHQ QUESTIONS 1-9: 8
10. IF YOU CHECKED OFF ANY PROBLEMS, HOW DIFFICULT HAVE THESE PROBLEMS MADE IT FOR YOU TO DO YOUR WORK, TAKE CARE OF THINGS AT HOME, OR GET ALONG WITH OTHER PEOPLE: SOMEWHAT DIFFICULT

## 2025-06-23 ASSESSMENT — SOCIAL DETERMINANTS OF HEALTH (SDOH): HOW OFTEN DO YOU GET TOGETHER WITH FRIENDS OR RELATIVES?: ONCE A WEEK

## 2025-06-24 ENCOUNTER — OFFICE VISIT (OUTPATIENT)
Dept: FAMILY MEDICINE | Facility: CLINIC | Age: 25
End: 2025-06-24
Payer: COMMERCIAL

## 2025-06-24 VITALS
HEIGHT: 63 IN | HEART RATE: 75 BPM | TEMPERATURE: 98.6 F | OXYGEN SATURATION: 98 % | BODY MASS INDEX: 26.67 KG/M2 | DIASTOLIC BLOOD PRESSURE: 65 MMHG | WEIGHT: 150.5 LBS | SYSTOLIC BLOOD PRESSURE: 94 MMHG | RESPIRATION RATE: 16 BRPM

## 2025-06-24 DIAGNOSIS — F43.9 STRESS: ICD-10-CM

## 2025-06-24 DIAGNOSIS — D13.91 FAP (FAMILIAL ADENOMATOUS POLYPOSIS): ICD-10-CM

## 2025-06-24 DIAGNOSIS — Z00.00 ROUTINE GENERAL MEDICAL EXAMINATION AT A HEALTH CARE FACILITY: Primary | ICD-10-CM

## 2025-06-24 DIAGNOSIS — Z12.4 CERVICAL CANCER SCREENING: ICD-10-CM

## 2025-06-24 DIAGNOSIS — R87.610 ASCUS OF CERVIX WITH NEGATIVE HIGH RISK HPV: ICD-10-CM

## 2025-06-24 DIAGNOSIS — A60.04 HERPES SIMPLEX VULVOVAGINITIS: ICD-10-CM

## 2025-06-24 PROCEDURE — 3074F SYST BP LT 130 MM HG: CPT | Performed by: FAMILY MEDICINE

## 2025-06-24 PROCEDURE — 99395 PREV VISIT EST AGE 18-39: CPT | Performed by: FAMILY MEDICINE

## 2025-06-24 PROCEDURE — 1126F AMNT PAIN NOTED NONE PRSNT: CPT | Performed by: FAMILY MEDICINE

## 2025-06-24 PROCEDURE — 3078F DIAST BP <80 MM HG: CPT | Performed by: FAMILY MEDICINE

## 2025-06-24 ASSESSMENT — PAIN SCALES - GENERAL: PAINLEVEL_OUTOF10: NO PAIN (0)

## 2025-06-24 NOTE — PATIENT INSTRUCTIONS
Patient Education   Preventive Care Advice   This is general advice given by our system to help you stay healthy. However, your care team may have specific advice just for you. Please talk to your care team about your preventive care needs.  Nutrition  Eat 5 or more servings of fruits and vegetables each day.  Try wheat bread, brown rice and whole grain pasta (instead of white bread, rice, and pasta).  Get enough calcium and vitamin D. Check the label on foods and aim for 100% of the RDA (recommended daily allowance).  Lifestyle  Exercise at least 150 minutes each week  (30 minutes a day, 5 days a week).  Do muscle strengthening activities 2 days a week. These help control your weight and prevent disease.  No smoking.  Wear sunscreen to prevent skin cancer.  Have a dental exam and cleaning every 6 months.  Yearly exams  See your health care team every year to talk about:  Any changes in your health.  Any medicines your care team has prescribed.  Preventive care, family planning, and ways to prevent chronic diseases.  Shots (vaccines)   HPV shots (up to age 26), if you've never had them before.  Hepatitis B shots (up to age 59), if you've never had them before.  COVID-19 shot: Get this shot when it's due.  Flu shot: Get a flu shot every year.  Tetanus shot: Get a tetanus shot every 10 years.  Pneumococcal, hepatitis A, and RSV shots: Ask your care team if you need these based on your risk.  Shingles shot (for age 50 and up)  General health tests  Diabetes screening:  Starting at age 35, Get screened for diabetes at least every 3 years.  If you are younger than age 35, ask your care team if you should be screened for diabetes.  Cholesterol test: At age 39, start having a cholesterol test every 5 years, or more often if advised.  Bone density scan (DEXA): At age 50, ask your care team if you should have this scan for osteoporosis (brittle bones).  Hepatitis C: Get tested at least once in your life.  STIs (sexually  transmitted infections)  Before age 24: Ask your care team if you should be screened for STIs.  After age 24: Get screened for STIs if you're at risk. You are at risk for STIs (including HIV) if:  You are sexually active with more than one person.  You don't use condoms every time.  You or a partner was diagnosed with a sexually transmitted infection.  If you are at risk for HIV, ask about PrEP medicine to prevent HIV.  Get tested for HIV at least once in your life, whether you are at risk for HIV or not.  Cancer screening tests  Cervical cancer screening: If you have a cervix, begin getting regular cervical cancer screening tests starting at age 21.  Breast cancer scan (mammogram): If you've ever had breasts, begin having regular mammograms starting at age 40. This is a scan to check for breast cancer.  Colon cancer screening: It is important to start screening for colon cancer at age 45.  Have a colonoscopy test every 10 years (or more often if you're at risk) Or, ask your provider about stool tests like a FIT test every year or Cologuard test every 3 years.  To learn more about your testing options, visit:   .  For help making a decision, visit:   https://bit.ly/jr56274.  Prostate cancer screening test: If you have a prostate, ask your care team if a prostate cancer screening test (PSA) at age 55 is right for you.  Lung cancer screening: If you are a current or former smoker ages 50 to 80, ask your care team if ongoing lung cancer screenings are right for you.  For informational purposes only. Not to replace the advice of your health care provider. Copyright   2023 [facility]. All rights reserved. Clinically reviewed by the [facility] Transitions Program. [employer] 830906 - REV 01/24.  Learning About Stress  What is stress?     Stress is your body's response to a hard situation. Your body can have a physical, emotional, or mental response. Stress is a fact of life for most people, and it  affects everyone differently. What causes stress for you may not be stressful for someone else.  A lot of things can cause stress. You may feel stress when you go on a job interview, take a test, or run a race. This kind of short-term stress is normal and even useful. It can help you if you need to work hard or react quickly. For example, stress can help you finish an important job on time.  Long-term stress is caused by ongoing stressful situations or events. Examples of long-term stress include long-term health problems, ongoing problems at work, or conflicts in your family. Long-term stress can harm your health.  How does stress affect your health?  When you are stressed, your body responds as though you are in danger. It makes hormones that speed up your heart, make you breathe faster, and give you a burst of energy. This is called the fight-or-flight stress response. If the stress is over quickly, your body goes back to normal and no harm is done.  But if stress happens too often or lasts too long, it can have bad effects. Long-term stress can make you more likely to get sick, and it can make symptoms of some diseases worse. If you tense up when you are stressed, you may develop neck, shoulder, or low back pain. Stress is linked to high blood pressure and heart disease.  Stress also harms your emotional health. It can make you cifuentes, tense, or depressed. Your relationships may suffer, and you may not do well at work or school.  What can you do to manage stress?  You can try these things to help manage stress:   Do something active. Exercise or activity can help reduce stress. Walking is a great way to get started. Even everyday activities such as housecleaning or yard work can help.  Try yoga or jeremy chi. These techniques combine exercise and meditation. You may need some training at first to learn them.  Do something you enjoy. For example, listen to music or go to a movie. Practice your hobby or do volunteer  "work.  Meditate. This can help you relax, because you are not worrying about what happened before or what may happen in the future.  Do guided imagery. Imagine yourself in any setting that helps you feel calm. You can use online videos, books, or a teacher to guide you.  Do breathing exercises. For example:  From a standing position, bend forward from the waist with your knees slightly bent. Let your arms dangle close to the floor.  Breathe in slowly and deeply as you return to a standing position. Roll up slowly and lift your head last.  Hold your breath for just a few seconds in the standing position.  Breathe out slowly and bend forward from the waist.  Let your feelings out. Talk, laugh, cry, and express anger when you need to. Talking with supportive friends or family, a counselor, or a alexi leader about your feelings is a healthy way to relieve stress. Avoid discussing your feelings with people who make you feel worse.  Write. It may help to write about things that are bothering you. This helps you find out how much stress you feel and what is causing it. When you know this, you can find better ways to cope.  What can you do to prevent stress?  You might try some of these things to help prevent stress:  Manage your time. This helps you find time to do the things you want and need to do.  Get enough sleep. Your body recovers from the stresses of the day while you are sleeping.  Get support. Your family, friends, and community can make a difference in how you experience stress.  Limit your news feed. Avoid or limit time on social media or news that may make you feel stressed.  Do something active. Exercise or activity can help reduce stress. Walking is a great way to get started.  Where can you learn more?  Go to https://www.[employer].net/patiented  Enter N032 in the search box to learn more about \"Learning About Stress.\"  Current as of: October 24, 2024  Content Version: 14.5 2024-2025 [employer].   Care instructions adapted under license by your healthcare professional. If you have questions about a medical condition or this instruction, always ask your healthcare professional. [employer] disclaims any warranty or liability for your use of this information.    Learning About Depression Screening  What is depression screening?  Depression screening is a way to see if you have depression symptoms. It may be done by a doctor or counselor. It's often part of a routine checkup. That's because your mental health is just as important as your physical health.  Depression is a mental health condition that affects how you feel, think, and act. You may:  Have less energy.  Lose interest in your daily activities.  Feel sad and grouchy for a long time.  Depression is very common. It affects people of all ages.  Many things can lead to depression. Some people become depressed after they have a stroke or find out they have a major illness like cancer or heart disease. The death of a loved one or a breakup may lead to depression. It can run in families. Most experts believe that a combination of inherited genes and stressful life events can cause it.  What happens during screening?  You may be asked to fill out a form about your depression symptoms. You and the doctor will discuss your answers. The doctor may ask you more questions to learn more about how you think, act, and feel.  What happens after screening?  If you have symptoms of depression, your doctor will talk to you about your options.  Doctors usually treat depression with medicines or counseling. Often, combining the two works best. Many people don't get help because they think that they'll get over the depression on their own. But people with depression may not get better unless they get treatment.  The cause of depression is not well understood. There may be many factors involved. But if you have depression, it's not your fault.  A serious  "symptom of depression is thinking about death or suicide. If you or someone you care about talks about this or about feeling hopeless, get help right away.  It's important to know that depression can be treated. Medicine, counseling, and self-care may help.  Where can you learn more?  Go to https://www.[employer].net/patiented  Enter T185 in the search box to learn more about \"Learning About Depression Screening.\"  Current as of: July 31, 2024  Content Version: 14.5    7915-6247 [employer].   Care instructions adapted under license by your healthcare professional. If you have questions about a medical condition or this instruction, always ask your healthcare professional. [employer] disclaims any warranty or liability for your use of this information.       "

## 2025-06-24 NOTE — PROGRESS NOTES
Preventive Care Visit  [facility] FRANCESCA Valdez Ernesto Jenkins MD, Family Medicine  Jun 24, 2025      Assessment & Plan     Routine general medical examination at a health care facility  Discussed routine health maintenance age-appropriate    FAP (familial adenomatous polyposis)  Follows with GI for annual screening, primarily sigmoidoscopy    Herpes simplex vulvovaginitis  Recently diagnosed with HSV type II and this has been a major source of stress for her.  She is currently on suppressive therapy and we talked about how that works and how many folks can come off it at some point but we really do not know how often someone might have an outbreak or not.  At this point I would recommend that she stay on the suppressive therapy for peace of mind while we are working on everything else.    ASCUS of cervix with negative high risk HPV  Due for follow-up Pap/colposcopy  - Ob/Gyn  Referral; Future    Stress  Sees a therapist and that seems to be helping.  Has a dog that she likes to walk outside.  But still lots of like stress going on.  Last year we had started some Lexapro briefly after the death of her grandmother but she stopped taking it after a bit we talked about whether to restart that at some point depending on how she is doing.  And certainly there are other agents that can be used.    Cervical cancer screening  As above  - Ob/Gyn  Referral; Future    Patient has been advised of split billing requirements and indicates understanding: Yes      Reviewed preventive health counseling, as reflected in patient instructions  Counseling  Appropriate preventive services were addressed with this patient via screening, questionnaire, or discussion as appropriate for fall prevention, nutrition, physical activity, Tobacco-use cessation, social engagement, weight loss and cognition.  Checklist reviewing preventive services available has been given to the patient.  Reviewed patient's  diet, addressing concerns and/or questions.   She is at risk for lack of exercise and has been provided with information to increase physical activity for the benefit of her well-being.   The patient was instructed to see the dentist every 6 months.   She is at risk for psychosocial distress and has been provided with information to reduce risk.   The patient's PHQ-9 score is consistent with mild depression. She was provided with information regarding depression.             James Marr is a 25 year old, presenting for the following:  Physical        6/24/2025     4:05 PM   Additional Questions   Roomed by Mirta DAVILA   Accompanied by self          Healthy Habits:     Getting at least 3 servings of Calcium per day:  NO    Bi-annual eye exam:  NO    Dental care twice a year:  NO    Sleep apnea or symptoms of sleep apnea:  None    Diet:  Regular (no restrictions)    Frequency of exercise:  2-3 days/week    Duration of exercise:  15-30 minutes    Taking medications regularly:  Yes    Barriers to taking medications:  None    Medication side effects:  None    Additional concerns today:  Yes (Panic Attacks)    Here today for routine checkup         Advance Care Planning            6/23/2025   General Health   How would you rate your overall physical health? (!) FAIR   Feel stress (tense, anxious, or unable to sleep) To some extent   (!) STRESS CONCERN      6/23/2025   Nutrition   Three or more servings of calcium each day? (!) NO   Diet: Regular (no restrictions)   How many servings of fruit and vegetables per day? (!) 2-3   How many sweetened beverages each day? 0-1         6/23/2025   Exercise   Days per week of moderate/strenous exercise 3 days   Average minutes spent exercising at this level 30 min         6/23/2025   Social Factors   Frequency of gathering with friends or relatives Once a week   Worry food won't last until get money to buy more No   Food not last or not have enough money for food? No   Do you  have housing? (Housing is defined as stable permanent housing and does not include staying outside in a car, in a tent, in an abandoned building, in an overnight shelter, or couch-surfing.) Yes   Are you worried about losing your housing? No   Lack of transportation? No   Unable to get utilities (heat,electricity)? No         6/23/2025   Dental   Dentist two times every year? (!) NO       Today's PHQ-9 Score:       6/23/2025     5:02 PM   PHQ-9 SCORE   PHQ-9 Total Score MyChart 8 (Mild depression)   PHQ-9 Total Score 8        Patient-reported         6/23/2025   Substance Use   Alcohol more than 3/day or more than 7/wk No   Do you use any other substances recreationally? No     Social History     Tobacco Use     Smoking status: Never     Passive exposure: Never     Smokeless tobacco: Never   Vaping Use     Vaping status: Never Used   Substance Use Topics     Alcohol use: Not Currently     Comment: rare     Drug use: Never           10/10/2023   LAST FHS-7 RESULTS   1st degree relative breast or ovarian cancer No   Any relative bilateral breast cancer Unknown   Any male have breast cancer No   Any ONE woman have BOTH breast AND ovarian cancer Yes   Any woman with breast cancer before 50yrs Unknown   2 or more relatives with breast AND/OR ovarian cancer No   2 or more relatives with breast AND/OR bowel cancer No        Mammogram Screening - Patient under 40 years of age: Routine Mammogram Screening not recommended.         6/23/2025   STI Screening   New sexual partner(s) since last STI/HIV test? No     History of abnormal Pap smear: As above        Latest Ref Rng & Units 6/4/2024     8:26 AM 5/5/2023    11:31 AM 8/22/2022    10:43 AM   PAP / HPV   PAP  Atypical squamous cells of undetermined significance (ASC-US)  Low-grade squamous intraepithelial lesion (LSIL) encompassing HPV/mild dysplasia/CIN1  Low-grade squamous intraepithelial lesion (LSIL) encompassing HPV/mild dysplasia/CIN1    HPV 16 DNA Negative Negative    "   HPV 18 DNA Negative Negative      Other HR HPV Negative Positive              6/23/2025   Contraception/Family Planning   Questions about contraception or family planning No   What are your periods like? Not currently having periods        Reviewed and updated as needed this visit by Provider   Tobacco  Allergies  Meds  Problems  Med Hx  Surg Hx  Fam Hx            Labs reviewed in EPIC  BP Readings from Last 3 Encounters:   06/24/25 94/65   04/01/25 135/74   11/30/24 115/54    Wt Readings from Last 3 Encounters:   06/24/25 68.3 kg (150 lb 8 oz)   04/01/25 67.1 kg (148 lb)   11/30/24 65 kg (143 lb 3.2 oz)                      Review of Systems  CONSTITUTIONAL: NEGATIVE for fever, chills, change in weight  INTEGUMENTARY/SKIN: NEGATIVE for worrisome rashes, moles or lesions  EYES: NEGATIVE for vision changes or irritation  ENT/MOUTH: NEGATIVE for ear, mouth and throat problems  RESP: NEGATIVE for significant cough or SOB  BREAST: NEGATIVE for masses, tenderness or discharge  CV: NEGATIVE for chest pain, palpitations or peripheral edema  GI: NEGATIVE for nausea, abdominal pain, heartburn, or change in bowel habits  : NEGATIVE for frequency, dysuria, or hematuria  MUSCULOSKELETAL: NEGATIVE for significant arthralgias or myalgia  NEURO: NEGATIVE for weakness, dizziness or paresthesias  ENDOCRINE: NEGATIVE for temperature intolerance, skin/hair changes  HEME: NEGATIVE for bleeding problems  PSYCHIATRIC: NEGATIVE for changes in mood or affect     Objective    Exam  BP 94/65 (BP Location: Right arm, Patient Position: Sitting, Cuff Size: Adult Large)   Pulse 75   Temp 98.6  F (37  C) (Temporal)   Resp 16   Ht 1.607 m (5' 3.25\")   Wt 68.3 kg (150 lb 8 oz)   LMP 06/08/2025 (Approximate)   SpO2 98%   BMI 26.45 kg/m     Estimated body mass index is 26.45 kg/m  as calculated from the following:    Height as of this encounter: 1.607 m (5' 3.25\").    Weight as of this encounter: 68.3 kg (150 lb 8 oz).    Physical " Exam  GENERAL: alert and no distress  EYES: Eyes grossly normal to inspection, PERRL and conjunctivae and sclerae normal  HENT: ear canals and TM's normal, nose and mouth without ulcers or lesions  NECK: no adenopathy, no asymmetry, masses, or scars  RESP: lungs clear to auscultation - no rales, rhonchi or wheezes  CV: regular rate and rhythm, normal S1 S2, no S3 or S4, no murmur, click or rub, no peripheral edema  ABDOMEN: soft, nontender, no hepatosplenomegaly, no masses and bowel sounds normal  MS: no gross musculoskeletal defects noted, no edema  SKIN: no suspicious lesions or rashes  NEURO: Normal strength and tone, mentation intact and speech normal  PSYCH: mentation appears normal, affect normal/bright        Signed Electronically by: Courtney Jenkins MD    Answers submitted by the patient for this visit:  Patient Health Questionnaire (Submitted on 6/23/2025)  If you checked off any problems, how difficult have these problems made it for you to do your work, take care of things at home, or get along with other people?: Somewhat difficult  PHQ9 TOTAL SCORE: 8

## 2025-06-25 ENCOUNTER — PATIENT OUTREACH (OUTPATIENT)
Dept: CARE COORDINATION | Facility: CLINIC | Age: 25
End: 2025-06-25
Payer: COMMERCIAL

## 2025-06-25 ENCOUNTER — PATIENT OUTREACH (OUTPATIENT)
Dept: FAMILY MEDICINE | Facility: CLINIC | Age: 25
End: 2025-06-25
Payer: COMMERCIAL

## 2025-06-25 DIAGNOSIS — N87.0 MILD DYSPLASIA OF CERVIX: Primary | ICD-10-CM

## 2025-07-15 ENCOUNTER — TELEPHONE (OUTPATIENT)
Dept: GASTROENTEROLOGY | Facility: CLINIC | Age: 25
End: 2025-07-15
Payer: COMMERCIAL

## 2025-07-15 NOTE — TELEPHONE ENCOUNTER
"Pre visit planning completed.      Procedure details:    Patient scheduled for Flexible sigmoidoscopy on 7.30.2025.     Arrival time: 0730. Procedure time 0830    Facility location: [facility]; [address], [place] [address]. Check in location: 2nd Floor.    Sedation type: MAC    Pre op exam needed? No.    Indication for procedure: FAP      Chart review:     Electronic implanted devices? No    Recent diagnosis of diverticulitis within the last 6 weeks? No      Medication review:    Diabetic? No    Anticoagulants? No    Weight loss medication/injectable? No GLP-1 medication per patient's medication list. Nursing to verify with pre-assessment call.    Other medication HOLDING recommendations:  N/A      Prep for procedure:     Bowel prep recommendation: Miralax without magnesium citrate. Per 5.31.2025 TE \"She doesn't have much colon so just take until the output is clear yellow/green.\"   Due to: d/t poor prep with enemasin the past    Procedure information and instructions sent via [employer]         July Gunter RN  Endoscopy Procedure Pre Assessment   [phone] option 3   "

## 2025-07-16 NOTE — TELEPHONE ENCOUNTER
Attempted to contact patient in order to complete pre assessment questions.     No answer. Left message to return call to [phone] option 3.    Callback communication sent via [employer].    July Gunter RN

## 2025-07-16 NOTE — TELEPHONE ENCOUNTER
Pre assessment completed for upcoming procedure.   (Please see previous telephone encounter notes for complete details)    Patient returned call.       Procedure details:    Procedure date 07/30/25, arrival time 0730 and facility location reviewed.    Pre op exam needed? No.    Designated  policy reviewed. Instructed to have someone stay 24  hours post procedure.       Medication review:    Medications reviewed. Please see supporting documentation below. Holding recommendations discussed (if applicable).       Prep for procedure:     Procedure prep instructions reviewed.        Any additional information needed:  N/A      Patient verbalized understanding and had no questions or concerns at this time.      Lorri Rivas RN  Endoscopy Procedure Pre Assessment   [phone] option 3

## 2025-07-26 ENCOUNTER — TELEPHONE (OUTPATIENT)
Dept: FAMILY MEDICINE | Facility: CLINIC | Age: 25
End: 2025-07-26
Payer: COMMERCIAL

## 2025-07-26 DIAGNOSIS — N89.8 VAGINAL LESION: ICD-10-CM

## 2025-07-26 NOTE — TELEPHONE ENCOUNTER
Medication Question or Refill        What medication are you calling about (include dose and sig)?: valACYclovir (VALTREX) 1000 MG tablet     Preferred Pharmacy:       [facility] [id number] [facility] [address]  Phone: [phone] Fax: [phone]      Controlled Substance Agreement on file:   CSA -- Patient Level:    CSA: None found at the patient level.       Who prescribed the medication?: Courtney Jenkins    Do you need a refill? Yes    When did you use the medication last? 07/26/25    Patient offered an appointment? No    Do you have any questions or concerns?  No      Could we send this information to you in [facility] or would you prefer to receive a phone call?:   Patient would prefer a phone call   Okay to leave a detailed message?: Yes at Cell number on file:    Telephone Information:   Mobile [phone]

## 2025-07-26 NOTE — TELEPHONE ENCOUNTER
Patient calling with medication question. Reports she had requested 1000 mg tablets from her PCP for a Herpes outbreak she is having to her vagina. Reports the blisters are open now. Patient is currently taking 500 mg daily for chronic suppression with 1000 mg 3 times daily ordered 4/1 for acute outbreak. Advised if wanting to repeat treatment for acute outbreak that would be 1000 mg 3 times daily. Patient expressed understanding. Advised could triage symptoms or have patient seen in UC for symptoms with patient declining.     Sonia Hardwick RN 07/26/25 9:25 AM   [facility] Triage Nurse Advisor

## 2025-07-28 ENCOUNTER — MYC MEDICAL ADVICE (OUTPATIENT)
Dept: FAMILY MEDICINE | Facility: CLINIC | Age: 25
End: 2025-07-28
Payer: COMMERCIAL

## 2025-07-28 RX ORDER — VALACYCLOVIR HYDROCHLORIDE 1 G/1
1000 TABLET, FILM COATED ORAL 3 TIMES DAILY
Qty: 21 TABLET | Refills: 3 | Status: SHIPPED | OUTPATIENT
Start: 2025-07-28

## 2025-07-28 NOTE — TELEPHONE ENCOUNTER
Patient sent the following message in a separate encounter:      Previous RN notes was not routed to PCP.   Patient was seen for this concern by PCP on 6/24/25.  Routing to PCP to advise if refill can be given or if eVisit is appropriate.

## 2025-07-30 ENCOUNTER — LAB REQUISITION (OUTPATIENT)
Dept: LAB | Facility: CLINIC | Age: 25
End: 2025-07-30
Payer: COMMERCIAL

## 2025-07-30 ENCOUNTER — TRANSFERRED RECORDS (OUTPATIENT)
Dept: HEALTH INFORMATION MANAGEMENT | Facility: CLINIC | Age: 25
End: 2025-07-30
Payer: COMMERCIAL

## 2025-07-30 DIAGNOSIS — Q87.89 GARDNER'S SYNDROME: ICD-10-CM

## 2025-07-30 DIAGNOSIS — D13.91 FAP (FAMILIAL ADENOMATOUS POLYPOSIS): Primary | ICD-10-CM

## 2025-07-30 PROCEDURE — 88305 TISSUE EXAM BY PATHOLOGIST: CPT | Mod: TC,ORL | Performed by: COLON & RECTAL SURGERY

## 2025-07-31 PROCEDURE — 88305 TISSUE EXAM BY PATHOLOGIST: CPT | Mod: 26 | Performed by: PATHOLOGY

## 2025-08-05 ENCOUNTER — TELEPHONE (OUTPATIENT)
Dept: GASTROENTEROLOGY | Facility: CLINIC | Age: 25
End: 2025-08-05
Payer: COMMERCIAL

## 2025-08-13 ENCOUNTER — TELEPHONE (OUTPATIENT)
Dept: GASTROENTEROLOGY | Facility: CLINIC | Age: 25
End: 2025-08-13
Payer: COMMERCIAL

## (undated) DEVICE — SU PROLENE 2-0 SHDA 36" 8523H

## (undated) DEVICE — ENDO TROCAR SLEEVE KII ADV FIXATION 05X100MM CFS02

## (undated) DEVICE — SUCTION MANIFOLD NEPTUNE 2 SYS 4 PORT 0702-020-000

## (undated) DEVICE — SU VICRYL 3-0 SH 27" UND J416H

## (undated) DEVICE — NDL 19GA 1.5"

## (undated) DEVICE — HYDROGEN PEROXIDE 3% 16OZ D0012

## (undated) DEVICE — BLADE KNIFE SURG 10 371110

## (undated) DEVICE — ESU HARMONIC ACE LAP SHEARS ETHICON ACE+ 7 5MMX36CM HARH36

## (undated) DEVICE — DAVINCI XI REDUCER 8-12MM 470381

## (undated) DEVICE — DAVINCI XI DRAPE COLUMN 470341

## (undated) DEVICE — DAVINCI SEAL CANNULA AND STPL 12MM 470380

## (undated) DEVICE — STPL CIRCULAR ENDOPATH 29MM CVD ECS29A

## (undated) DEVICE — STPL POWERED ECHELON 60MM PSEE60A

## (undated) DEVICE — KIT CONNECTOR FOR OLYMPUS ENDOSCOPES DEFENDO 100310

## (undated) DEVICE — PACK HAND WRIST SOP15HWFSP

## (undated) DEVICE — GOWN XLG DISP 9545

## (undated) DEVICE — DAVINCI HOT SHEARS TIP COVER  400180

## (undated) DEVICE — ENDO ACCESS PLATFORM GELPOINT MINI CNGL3

## (undated) DEVICE — SUCTION TIP POOLE K770

## (undated) DEVICE — SU VICRYL 0 UR-6 27" J603H

## (undated) DEVICE — SU MONOCRYL 4-0 PS-2 27" UND Y426H

## (undated) DEVICE — KIT PATIENT POSITIONING PIGAZZI LATEX FREE 40580

## (undated) DEVICE — DAVINCI XI SEAL UNIVERSAL 5-8MM 470361

## (undated) DEVICE — SU PROLENE 1 CTX 30" 8455H

## (undated) DEVICE — GOWN IMPERVIOUS BREATHABLE SMART XLG 89045

## (undated) DEVICE — SOL WATER IRRIG 1000ML BOTTLE 07139-09

## (undated) DEVICE — WIPE PREMOIST CLEANSING WASHCLOTHS 7988

## (undated) DEVICE — STPL RELOAD REG/THK TISSUE ECHELON 60 X 3.8MM GOLD GST60D

## (undated) DEVICE — SU VICRYL 0 CT-1 36" J346H

## (undated) DEVICE — BASIN SET SINGLE STERILE 13752-624

## (undated) DEVICE — SOL ADH LIQUID BENZOIN SWAB 0.6ML C1544

## (undated) DEVICE — TUBING SUCTION 10'X3/16" N510

## (undated) DEVICE — GLOVE PROTEXIS MICRO 7.5  2D73PM75

## (undated) DEVICE — PROTECTOR ARM ONE-STEP TRENDELENBURG 40418

## (undated) DEVICE — ENDO TROCAR FIRST ENTRY KII FIOS ADV FIX 05X100MM CFF03

## (undated) DEVICE — SU MONOCRYL 4-0 RB-1 27" Y214H

## (undated) DEVICE — ENDO SYSTEM WATER BOTTLE & TUBING W/CO2 FILTER 00711549

## (undated) DEVICE — CLOSURE SYS SKIN PREMIERPRO EXOFIN FUSION 4X22CM STRL 3472

## (undated) DEVICE — PAD CHUX UNDERPAD 23X24" 7136

## (undated) DEVICE — PACK DAVINCI UROL

## (undated) DEVICE — DRAPE STERI TOWEL SM 1000

## (undated) DEVICE — PREP POVIDONE IODINE SOLUTION 10% 4OZ

## (undated) DEVICE — JELLY LUBRICATING SURGILUBE 2OZ TUBE

## (undated) DEVICE — GLOVE PROTEXIS W/NEU-THERA 7.0  2D73TE70

## (undated) DEVICE — DAVINCI XI DRAPE ARM 470015

## (undated) DEVICE — GLOVE PROTEXIS BLUE W/NEU-THERA 7.0  2D73EB70

## (undated) DEVICE — SU ETHILON 4-0 FS-2 18" 662H

## (undated) DEVICE — DRSG STERI STRIP 1/2X4" R1547

## (undated) DEVICE — DRAPE SPLIT SHEET 77X108 REINFORCED 29436

## (undated) DEVICE — SU SILK 4-0 RB-1 30" K871H

## (undated) DEVICE — ENDO TROCAR CONMED AIRSEAL BLADELESS 05X120MM IAS5-120LP

## (undated) DEVICE — APPLICATORS COTTON TIP 6"X2 STERILE LF C15053-006

## (undated) DEVICE — PREP CHLORAPREP 26ML TINTED ORANGE  260815

## (undated) DEVICE — TUBING CONMED AIRSEAL SMOKE EVAC INSUFFLATION ASM-EVAC

## (undated) DEVICE — KIT ENDO FIRST STEP DISINFECTANT 200ML W/POUCH EP-4

## (undated) DEVICE — LINEN TOWEL PACK X6 WHITE 5487

## (undated) DEVICE — SOL WATER IRRIG 3000ML BAG 2B7117

## (undated) DEVICE — LINEN TOWEL PACK X30 5481

## (undated) DEVICE — SOL NACL 0.9% IRRIG 1000ML BOTTLE 07138-09

## (undated) DEVICE — GLOVE PROTEXIS BLUE W/NEU-THERA 8.0  2D73EB80

## (undated) DEVICE — SUCTION TIP YANKAUER STR K87

## (undated) DEVICE — ENDO TROCAR FIRST ENTRY KII FIOS Z-THRD 12X100MM CTF73

## (undated) DEVICE — SOL WATER IRRIG 1000ML BOTTLE 2F7114

## (undated) DEVICE — SU VICRYL 3-0 SH 27" J316H

## (undated) RX ORDER — FENTANYL CITRATE 50 UG/ML
INJECTION, SOLUTION INTRAMUSCULAR; INTRAVENOUS
Status: DISPENSED
Start: 2021-04-22

## (undated) RX ORDER — ONDANSETRON 2 MG/ML
INJECTION INTRAMUSCULAR; INTRAVENOUS
Status: DISPENSED
Start: 2021-04-22

## (undated) RX ORDER — PROPOFOL 10 MG/ML
INJECTION, EMULSION INTRAVENOUS
Status: DISPENSED
Start: 2021-04-22

## (undated) RX ORDER — HYDROMORPHONE HYDROCHLORIDE 1 MG/ML
INJECTION, SOLUTION INTRAMUSCULAR; INTRAVENOUS; SUBCUTANEOUS
Status: DISPENSED
Start: 2021-04-22

## (undated) RX ORDER — FENTANYL CITRATE 50 UG/ML
INJECTION, SOLUTION INTRAMUSCULAR; INTRAVENOUS
Status: DISPENSED
Start: 2020-08-17

## (undated) RX ORDER — LIDOCAINE HYDROCHLORIDE 20 MG/ML
INJECTION, SOLUTION EPIDURAL; INFILTRATION; INTRACAUDAL; PERINEURAL
Status: DISPENSED
Start: 2021-04-22

## (undated) RX ORDER — ERTAPENEM 1 G/1
INJECTION, POWDER, LYOPHILIZED, FOR SOLUTION INTRAMUSCULAR; INTRAVENOUS
Status: DISPENSED
Start: 2021-04-22

## (undated) RX ORDER — CELECOXIB 200 MG/1
CAPSULE ORAL
Status: DISPENSED
Start: 2021-04-22

## (undated) RX ORDER — DEXAMETHASONE SODIUM PHOSPHATE 4 MG/ML
INJECTION, SOLUTION INTRA-ARTICULAR; INTRALESIONAL; INTRAMUSCULAR; INTRAVENOUS; SOFT TISSUE
Status: DISPENSED
Start: 2021-04-22

## (undated) RX ORDER — GRANISETRON HYDROCHLORIDE 1 MG/ML
INJECTION INTRAVENOUS
Status: DISPENSED
Start: 2021-04-22